# Patient Record
Sex: FEMALE | Race: WHITE | NOT HISPANIC OR LATINO | Employment: OTHER | URBAN - METROPOLITAN AREA
[De-identification: names, ages, dates, MRNs, and addresses within clinical notes are randomized per-mention and may not be internally consistent; named-entity substitution may affect disease eponyms.]

---

## 2023-04-17 ENCOUNTER — TELEPHONE (OUTPATIENT)
Dept: PRIMARY CARE | Facility: CLINIC | Age: 73
End: 2023-04-17
Payer: MEDICARE

## 2023-05-11 ENCOUNTER — OFFICE VISIT (OUTPATIENT)
Dept: PRIMARY CARE | Facility: CLINIC | Age: 73
End: 2023-05-11
Payer: MEDICARE

## 2023-05-11 ENCOUNTER — LAB (OUTPATIENT)
Dept: LAB | Facility: LAB | Age: 73
End: 2023-05-11
Payer: MEDICARE

## 2023-05-11 VITALS
WEIGHT: 134 LBS | BODY MASS INDEX: 26.31 KG/M2 | SYSTOLIC BLOOD PRESSURE: 169 MMHG | HEIGHT: 60 IN | DIASTOLIC BLOOD PRESSURE: 74 MMHG | HEART RATE: 88 BPM | OXYGEN SATURATION: 98 %

## 2023-05-11 DIAGNOSIS — I10 HYPERTENSION, UNSPECIFIED TYPE: ICD-10-CM

## 2023-05-11 DIAGNOSIS — E03.9 HYPOTHYROIDISM, UNSPECIFIED TYPE: ICD-10-CM

## 2023-05-11 DIAGNOSIS — E11.9 TYPE 2 DIABETES MELLITUS WITHOUT COMPLICATION, WITHOUT LONG-TERM CURRENT USE OF INSULIN (MULTI): ICD-10-CM

## 2023-05-11 DIAGNOSIS — I10 HYPERTENSION, UNSPECIFIED TYPE: Primary | ICD-10-CM

## 2023-05-11 DIAGNOSIS — E78.5 HYPERLIPIDEMIA, UNSPECIFIED HYPERLIPIDEMIA TYPE: ICD-10-CM

## 2023-05-11 LAB
ESTIMATED AVERAGE GLUCOSE FOR HBA1C: 151 MG/DL
HEMOGLOBIN A1C/HEMOGLOBIN TOTAL IN BLOOD: 6.9 %
THYROTROPIN (MIU/L) IN SER/PLAS BY DETECTION LIMIT <= 0.05 MIU/L: 0.45 MIU/L (ref 0.44–3.98)

## 2023-05-11 PROCEDURE — 83036 HEMOGLOBIN GLYCOSYLATED A1C: CPT

## 2023-05-11 PROCEDURE — 84443 ASSAY THYROID STIM HORMONE: CPT

## 2023-05-11 PROCEDURE — 3077F SYST BP >= 140 MM HG: CPT | Performed by: STUDENT IN AN ORGANIZED HEALTH CARE EDUCATION/TRAINING PROGRAM

## 2023-05-11 PROCEDURE — 1159F MED LIST DOCD IN RCRD: CPT | Performed by: STUDENT IN AN ORGANIZED HEALTH CARE EDUCATION/TRAINING PROGRAM

## 2023-05-11 PROCEDURE — 36415 COLL VENOUS BLD VENIPUNCTURE: CPT

## 2023-05-11 PROCEDURE — 84244 ASSAY OF RENIN: CPT

## 2023-05-11 PROCEDURE — 3078F DIAST BP <80 MM HG: CPT | Performed by: STUDENT IN AN ORGANIZED HEALTH CARE EDUCATION/TRAINING PROGRAM

## 2023-05-11 PROCEDURE — 1160F RVW MEDS BY RX/DR IN RCRD: CPT | Performed by: STUDENT IN AN ORGANIZED HEALTH CARE EDUCATION/TRAINING PROGRAM

## 2023-05-11 PROCEDURE — 99214 OFFICE O/P EST MOD 30 MIN: CPT | Performed by: STUDENT IN AN ORGANIZED HEALTH CARE EDUCATION/TRAINING PROGRAM

## 2023-05-11 PROCEDURE — 82088 ASSAY OF ALDOSTERONE: CPT

## 2023-05-11 RX ORDER — ARTIFICIAL TEARS 1; 2; 3 MG/ML; MG/ML; MG/ML
SOLUTION/ DROPS OPHTHALMIC 4 TIMES DAILY
COMMUNITY

## 2023-05-11 RX ORDER — BLOOD-GLUCOSE METER
EACH MISCELLANEOUS
COMMUNITY
End: 2023-05-18

## 2023-05-11 RX ORDER — ALBUTEROL SULFATE 90 UG/1
AEROSOL, METERED RESPIRATORY (INHALATION)
COMMUNITY
Start: 2021-11-08

## 2023-05-11 RX ORDER — PYRIDOXINE HCL (VITAMIN B6) 100 MG
1 TABLET ORAL DAILY
COMMUNITY
Start: 2021-01-15

## 2023-05-11 RX ORDER — FLUTICASONE PROPIONATE 50 MCG
2 SPRAY, SUSPENSION (ML) NASAL DAILY
COMMUNITY
Start: 2017-12-06 | End: 2023-08-03 | Stop reason: ALTCHOICE

## 2023-05-11 RX ORDER — GLUCOSAMINE/CHONDR SU A SOD 750-600 MG
TABLET ORAL
COMMUNITY
Start: 2021-01-15

## 2023-05-11 RX ORDER — BUPROPION HYDROCHLORIDE 150 MG/1
1 TABLET ORAL DAILY
COMMUNITY
Start: 2021-01-15 | End: 2023-09-01

## 2023-05-11 RX ORDER — ATENOLOL AND CHLORTHALIDONE TABLET 100; 25 MG/1; MG/1
1 TABLET ORAL DAILY
COMMUNITY
End: 2023-05-11 | Stop reason: ALTCHOICE

## 2023-05-11 RX ORDER — CLONIDINE HYDROCHLORIDE 0.1 MG/1
TABLET ORAL
COMMUNITY
Start: 2023-04-16 | End: 2023-05-11 | Stop reason: SDUPTHER

## 2023-05-11 RX ORDER — LEVOTHYROXINE SODIUM 13 UG/1
CAPSULE ORAL
COMMUNITY
End: 2023-08-03 | Stop reason: SDUPTHER

## 2023-05-11 RX ORDER — ACETAMINOPHEN 500 MG
1 TABLET ORAL DAILY
COMMUNITY
Start: 2021-01-15

## 2023-05-11 RX ORDER — ATENOLOL AND CHLORTHALIDONE TABLET 50; 25 MG/1; MG/1
1 TABLET ORAL DAILY
COMMUNITY
End: 2023-05-11 | Stop reason: ALTCHOICE

## 2023-05-11 RX ORDER — FUROSEMIDE 20 MG/1
1 TABLET ORAL DAILY
COMMUNITY
Start: 2023-01-01

## 2023-05-11 RX ORDER — CLONIDINE HYDROCHLORIDE 0.1 MG/1
0.1 TABLET ORAL 2 TIMES DAILY
Qty: 60 TABLET | Refills: 1 | Status: SHIPPED | OUTPATIENT
Start: 2023-05-11 | End: 2024-05-21

## 2023-05-11 RX ORDER — PAROXETINE 10 MG/1
1 TABLET, FILM COATED ORAL DAILY
COMMUNITY

## 2023-05-11 RX ORDER — LOSARTAN POTASSIUM 100 MG/1
1 TABLET ORAL DAILY
COMMUNITY
Start: 2022-11-30 | End: 2023-05-11 | Stop reason: ALTCHOICE

## 2023-05-11 RX ORDER — MULTIVITAMIN
1 TABLET ORAL DAILY
COMMUNITY
Start: 2023-03-13

## 2023-05-11 RX ORDER — ATORVASTATIN CALCIUM 20 MG/1
1 TABLET, FILM COATED ORAL DAILY
COMMUNITY
Start: 2020-06-05 | End: 2023-06-02

## 2023-05-11 RX ORDER — LEVOTHYROXINE SODIUM 125 UG/1
1 TABLET ORAL DAILY
COMMUNITY
Start: 2012-10-25 | End: 2023-06-02

## 2023-05-11 RX ORDER — AMLODIPINE AND OLMESARTAN MEDOXOMIL 5; 40 MG/1; MG/1
1 TABLET ORAL DAILY
COMMUNITY
Start: 2023-01-04 | End: 2024-04-22

## 2023-05-11 RX ORDER — METFORMIN HYDROCHLORIDE 500 MG/1
1 TABLET, EXTENDED RELEASE ORAL DAILY
COMMUNITY
Start: 2020-03-23 | End: 2023-06-19

## 2023-05-11 RX ORDER — LANCETS 33 GAUGE
EACH MISCELLANEOUS
COMMUNITY
Start: 2023-04-25 | End: 2023-11-17

## 2023-05-11 RX ORDER — LANCETS 30 GAUGE
EACH MISCELLANEOUS
COMMUNITY
Start: 2023-02-07

## 2023-05-11 RX ORDER — LOSARTAN POTASSIUM 50 MG/1
1 TABLET ORAL DAILY
COMMUNITY
Start: 2022-11-18 | End: 2023-05-11 | Stop reason: ALTCHOICE

## 2023-05-11 RX ORDER — LEVOTHYROXINE SODIUM 150 UG/1
150 TABLET ORAL DAILY
COMMUNITY
End: 2023-08-03 | Stop reason: SDUPTHER

## 2023-05-11 RX ORDER — AMLODIPINE AND OLMESARTAN MEDOXOMIL 5; 20 MG/1; MG/1
1 TABLET ORAL DAILY
COMMUNITY
Start: 2023-01-27 | End: 2023-05-11 | Stop reason: ALTCHOICE

## 2023-05-11 ASSESSMENT — COLUMBIA-SUICIDE SEVERITY RATING SCALE - C-SSRS
6. HAVE YOU EVER DONE ANYTHING, STARTED TO DO ANYTHING, OR PREPARED TO DO ANYTHING TO END YOUR LIFE?: NO
2. HAVE YOU ACTUALLY HAD ANY THOUGHTS OF KILLING YOURSELF?: NO
1. IN THE PAST MONTH, HAVE YOU WISHED YOU WERE DEAD OR WISHED YOU COULD GO TO SLEEP AND NOT WAKE UP?: NO

## 2023-05-11 ASSESSMENT — ENCOUNTER SYMPTOMS
DEPRESSION: 0
LOSS OF SENSATION IN FEET: 0
OCCASIONAL FEELINGS OF UNSTEADINESS: 0

## 2023-05-11 ASSESSMENT — PATIENT HEALTH QUESTIONNAIRE - PHQ9
2. FEELING DOWN, DEPRESSED OR HOPELESS: NOT AT ALL
1. LITTLE INTEREST OR PLEASURE IN DOING THINGS: NOT AT ALL
SUM OF ALL RESPONSES TO PHQ9 QUESTIONS 1 AND 2: 0

## 2023-05-11 NOTE — PROGRESS NOTES
HPI: 72-year-old female presenting for multiple concerns:    HTN  Home blood pressures have been ranging from the 130s to 180s.  Has monthly pattern roughly of blood pressure spikes, despite strict compliance.  Following with cardiology.  Denies chest pain or shortness of breath.    Hypothyroidism  Last lab within normal limits.    DMII  Stable, tolerating regimen well    HLD  Stable    12 point ROS reviewed and negative other than as stated in HPI    General: Alert, oriented, pleasant, in no acute distress  HEENT:    Head: normocephalic, atraumatic;    eyes: EOMI, no scleral icterus;   CV: Heart with regular rate and rhythm, normal S1/S2, no murmurs  Lungs: CTAB without wheezing, rhonchi or rales; good respiratory effort, no increased work of breathing  Neuro: Cranial nerves grossly intact; alert and oriented  Psych: Appropriate mood and affect    1. HTN  -Above goal in office  - Continue carvedilol 12.5 mg twice daily, amlodipine-olmesartan 5-40 mg daily  -Can take clonidine 0.1 mg twice daily and monitor blood pressure closely, if over 160, should call cardiology  -Continue with cardiology appointment in 2 weeks  - Renal artery ultrasound negative, will get 24-hour metanephrines and aldosterone/renal in activity, though suspicion is low    2. Hypothyroidism  -Continue levothyroxine 125 mcg daily  -Repeat lab    3. DMII  -Last A1c at goal  -Continue metformin  mg every other day  -Repeat A1c    4. HLD  -Continue atorvastatin 20 mg daily    F/U 3-4 months    Chris D'Amico, DO

## 2023-05-16 ENCOUNTER — LAB (OUTPATIENT)
Dept: LAB | Facility: LAB | Age: 73
End: 2023-05-16
Payer: MEDICARE

## 2023-05-16 DIAGNOSIS — I10 HYPERTENSION, UNSPECIFIED TYPE: ICD-10-CM

## 2023-05-16 PROCEDURE — 83835 ASSAY OF METANEPHRINES: CPT

## 2023-05-16 NOTE — RESULT ENCOUNTER NOTE
Thyroid is normal    hemoglobin A1c at 6.9, has been slowly increasing.  Need to improve diet, less carbohydrates.  We will continue to monitor at 3-month intervals.  If continuing to go up, we will need to consider escalation of medical therapy.    Still pending urine results, may take several days.

## 2023-05-18 DIAGNOSIS — E11.9 TYPE 2 DIABETES MELLITUS WITHOUT COMPLICATION, WITHOUT LONG-TERM CURRENT USE OF INSULIN (MULTI): Primary | ICD-10-CM

## 2023-05-18 RX ORDER — BLOOD-GLUCOSE METER
EACH MISCELLANEOUS
Qty: 100 STRIP | Refills: 3 | Status: SHIPPED | OUTPATIENT
Start: 2023-05-18 | End: 2023-10-09

## 2023-05-23 LAB
ALDOSTERONE IN SERUM: 4.8 NG/DL
ALDOSTERONE/RENIN ACTIVITY CALCULATION: 16 RATIO
RENIN ACTIVITY: 0.3 NG/ML/HR

## 2023-05-24 ENCOUNTER — DOCUMENTATION (OUTPATIENT)
Dept: PRIMARY CARE | Facility: CLINIC | Age: 73
End: 2023-05-24
Payer: MEDICARE

## 2023-05-24 NOTE — RESULT ENCOUNTER NOTE
Labs came back from the secondary hypertension evaluation, all within normal limits as expected.  Continue with upcoming cardiology appointment.

## 2023-05-26 ENCOUNTER — DOCUMENTATION (OUTPATIENT)
Dept: PRIMARY CARE | Facility: CLINIC | Age: 73
End: 2023-05-26
Payer: MEDICARE

## 2023-05-26 ENCOUNTER — TELEPHONE (OUTPATIENT)
Dept: PRIMARY CARE | Facility: CLINIC | Age: 73
End: 2023-05-26
Payer: MEDICARE

## 2023-05-26 NOTE — TELEPHONE ENCOUNTER
----- Message from Christopher D'Amico, DO sent at 5/24/2023  8:59 AM EDT -----  Labs came back from the secondary hypertension evaluation, all within normal limits as expected.  Continue with upcoming cardiology appointment.

## 2023-06-02 DIAGNOSIS — E78.5 HYPERLIPIDEMIA, UNSPECIFIED HYPERLIPIDEMIA TYPE: Primary | ICD-10-CM

## 2023-06-02 DIAGNOSIS — E03.9 HYPOTHYROIDISM, UNSPECIFIED TYPE: ICD-10-CM

## 2023-06-02 RX ORDER — LEVOTHYROXINE SODIUM 125 UG/1
125 TABLET ORAL DAILY
Qty: 90 TABLET | Refills: 1 | Status: SHIPPED | OUTPATIENT
Start: 2023-06-02 | End: 2023-12-01

## 2023-06-02 RX ORDER — ATORVASTATIN CALCIUM 20 MG/1
20 TABLET, FILM COATED ORAL DAILY
Qty: 90 TABLET | Refills: 1 | Status: SHIPPED | OUTPATIENT
Start: 2023-06-02 | End: 2023-11-17

## 2023-06-14 LAB
METANEPHRINES, 24 HOUR URINE: 79 UG/24 HR (ref 36–209)
METANEPHRINES, URINE: 47 UG/L
NORMETANEPHRINE 24 HOUR URINE: 334 UG/24 HR (ref 131–612)
NORMETANEPHRINE, URINE: 198 UG/L
VOLUME OF URINE (LC): 1688

## 2023-06-16 DIAGNOSIS — E11.9 TYPE 2 DIABETES MELLITUS WITHOUT COMPLICATION, WITHOUT LONG-TERM CURRENT USE OF INSULIN (MULTI): Primary | ICD-10-CM

## 2023-06-19 RX ORDER — METFORMIN HYDROCHLORIDE 500 MG/1
TABLET, EXTENDED RELEASE ORAL
Qty: 90 TABLET | Refills: 0 | Status: SHIPPED | OUTPATIENT
Start: 2023-06-19 | End: 2023-09-18 | Stop reason: SDUPTHER

## 2023-08-03 ENCOUNTER — LAB (OUTPATIENT)
Dept: LAB | Facility: LAB | Age: 73
End: 2023-08-03
Payer: MEDICARE

## 2023-08-03 ENCOUNTER — OFFICE VISIT (OUTPATIENT)
Dept: PRIMARY CARE | Facility: CLINIC | Age: 73
End: 2023-08-03
Payer: MEDICARE

## 2023-08-03 VITALS
HEIGHT: 60 IN | BODY MASS INDEX: 25.32 KG/M2 | WEIGHT: 129 LBS | DIASTOLIC BLOOD PRESSURE: 54 MMHG | SYSTOLIC BLOOD PRESSURE: 148 MMHG | HEART RATE: 78 BPM | OXYGEN SATURATION: 99 %

## 2023-08-03 DIAGNOSIS — E55.9 VITAMIN D DEFICIENCY: ICD-10-CM

## 2023-08-03 DIAGNOSIS — E11.9 TYPE 2 DIABETES MELLITUS WITHOUT COMPLICATION, WITHOUT LONG-TERM CURRENT USE OF INSULIN (MULTI): ICD-10-CM

## 2023-08-03 DIAGNOSIS — I10 HYPERTENSION, UNSPECIFIED TYPE: ICD-10-CM

## 2023-08-03 DIAGNOSIS — E78.5 HYPERLIPIDEMIA, UNSPECIFIED HYPERLIPIDEMIA TYPE: ICD-10-CM

## 2023-08-03 DIAGNOSIS — I10 HYPERTENSION, UNSPECIFIED TYPE: Primary | ICD-10-CM

## 2023-08-03 DIAGNOSIS — N39.41 URGE INCONTINENCE: ICD-10-CM

## 2023-08-03 DIAGNOSIS — E03.9 HYPOTHYROIDISM, UNSPECIFIED TYPE: ICD-10-CM

## 2023-08-03 PROBLEM — H52.4 BILATERAL PRESBYOPIA: Status: ACTIVE | Noted: 2023-08-03

## 2023-08-03 PROBLEM — R82.90 ABNORMAL URINE FINDING: Status: ACTIVE | Noted: 2023-08-03

## 2023-08-03 PROBLEM — G31.84 MCI (MILD COGNITIVE IMPAIRMENT): Status: ACTIVE | Noted: 2023-08-03

## 2023-08-03 PROBLEM — Z97.3 WEARS EYEGLASSES: Status: ACTIVE | Noted: 2023-08-03

## 2023-08-03 PROBLEM — H04.123 DRY EYE SYNDROME OF BOTH LACRIMAL GLANDS: Status: ACTIVE | Noted: 2023-08-03

## 2023-08-03 PROBLEM — R55 SYNCOPE: Status: ACTIVE | Noted: 2023-08-03

## 2023-08-03 PROBLEM — R73.03 PREDIABETES: Status: ACTIVE | Noted: 2023-08-03

## 2023-08-03 PROBLEM — H43.399 VITREOUS FLOATERS: Status: ACTIVE | Noted: 2023-08-03

## 2023-08-03 PROBLEM — K63.5 COLON POLYPS: Status: ACTIVE | Noted: 2023-08-03

## 2023-08-03 PROBLEM — R93.89 ABNORMAL CHEST XRAY: Status: ACTIVE | Noted: 2023-08-03

## 2023-08-03 PROBLEM — R73.09 ABNORMAL GLUCOSE: Status: ACTIVE | Noted: 2023-08-03

## 2023-08-03 PROBLEM — F17.210 CIGARETTE NICOTINE DEPENDENCE WITHOUT COMPLICATION: Status: ACTIVE | Noted: 2023-08-03

## 2023-08-03 PROBLEM — H52.13 BILATERAL MYOPIA: Status: ACTIVE | Noted: 2023-08-03

## 2023-08-03 PROBLEM — H26.499 POSTERIOR CAPSULAR OPACIFICATION, OBSCURING VISION: Status: ACTIVE | Noted: 2023-08-03

## 2023-08-03 PROBLEM — E78.01 FAMILIAL HYPERCHOLESTEROLEMIA: Status: ACTIVE | Noted: 2023-08-03

## 2023-08-03 PROBLEM — Z96.1 PRESENCE OF ARTIFICIAL INTRA-OCULAR LENS: Status: ACTIVE | Noted: 2023-08-03

## 2023-08-03 PROBLEM — H04.129 DRY EYE SYNDROME: Status: ACTIVE | Noted: 2023-08-03

## 2023-08-03 PROBLEM — R09.89 CAROTID BRUIT: Status: ACTIVE | Noted: 2023-08-03

## 2023-08-03 PROBLEM — M76.829 POSTERIOR TIBIAL TENDON DYSFUNCTION: Status: ACTIVE | Noted: 2023-08-03

## 2023-08-03 PROBLEM — R09.89 ABDOMINAL BRUIT: Status: ACTIVE | Noted: 2023-08-03

## 2023-08-03 PROBLEM — R41.3 MEMORY IMPAIRMENT: Status: ACTIVE | Noted: 2023-08-03

## 2023-08-03 PROBLEM — R93.5 ABNORMAL CT OF THE ABDOMEN: Status: ACTIVE | Noted: 2023-08-03

## 2023-08-03 PROBLEM — F41.9 ANXIETY: Status: ACTIVE | Noted: 2023-08-03

## 2023-08-03 PROBLEM — M76.70 PERONEAL TENDONITIS: Status: ACTIVE | Noted: 2023-08-03

## 2023-08-03 PROBLEM — R53.83 FATIGUE: Status: ACTIVE | Noted: 2023-08-03

## 2023-08-03 PROBLEM — H43.813 POSTERIOR VITREOUS DETACHMENT OF BOTH EYES: Status: ACTIVE | Noted: 2023-08-03

## 2023-08-03 PROBLEM — I95.1 AUTONOMIC ORTHOSTATIC HYPOTENSION: Status: ACTIVE | Noted: 2023-08-03

## 2023-08-03 PROBLEM — H26.493 POSTERIOR CAPSULAR OPACIFICATION VISUALLY SIGNIFICANT OF BOTH EYES: Status: ACTIVE | Noted: 2023-08-03

## 2023-08-03 PROBLEM — R41.3 MEMORY LOSS, SHORT TERM: Status: ACTIVE | Noted: 2023-08-03

## 2023-08-03 PROBLEM — R93.1 ABNORMAL SCREENING CARDIAC CT: Status: ACTIVE | Noted: 2023-08-03

## 2023-08-03 PROBLEM — M25.579 SINUS TARSI SYNDROME: Status: ACTIVE | Noted: 2023-08-03

## 2023-08-03 PROBLEM — F32.A DEPRESSION: Status: ACTIVE | Noted: 2023-08-03

## 2023-08-03 PROBLEM — H92.09 UNILATERAL OTALGIA: Status: ACTIVE | Noted: 2023-08-03

## 2023-08-03 PROBLEM — M81.0 POSTMENOPAUSAL OSTEOPOROSIS: Status: ACTIVE | Noted: 2023-08-03

## 2023-08-03 PROBLEM — R73.9 HYPERGLYCEMIA: Status: ACTIVE | Noted: 2023-08-03

## 2023-08-03 PROCEDURE — 3044F HG A1C LEVEL LT 7.0%: CPT | Performed by: STUDENT IN AN ORGANIZED HEALTH CARE EDUCATION/TRAINING PROGRAM

## 2023-08-03 PROCEDURE — 3077F SYST BP >= 140 MM HG: CPT | Performed by: STUDENT IN AN ORGANIZED HEALTH CARE EDUCATION/TRAINING PROGRAM

## 2023-08-03 PROCEDURE — 82306 VITAMIN D 25 HYDROXY: CPT

## 2023-08-03 PROCEDURE — 82570 ASSAY OF URINE CREATININE: CPT

## 2023-08-03 PROCEDURE — 83036 HEMOGLOBIN GLYCOSYLATED A1C: CPT

## 2023-08-03 PROCEDURE — 82043 UR ALBUMIN QUANTITATIVE: CPT

## 2023-08-03 PROCEDURE — 1159F MED LIST DOCD IN RCRD: CPT | Performed by: STUDENT IN AN ORGANIZED HEALTH CARE EDUCATION/TRAINING PROGRAM

## 2023-08-03 PROCEDURE — 80061 LIPID PANEL: CPT

## 2023-08-03 PROCEDURE — 84443 ASSAY THYROID STIM HORMONE: CPT

## 2023-08-03 PROCEDURE — 1126F AMNT PAIN NOTED NONE PRSNT: CPT | Performed by: STUDENT IN AN ORGANIZED HEALTH CARE EDUCATION/TRAINING PROGRAM

## 2023-08-03 PROCEDURE — 80053 COMPREHEN METABOLIC PANEL: CPT

## 2023-08-03 PROCEDURE — 36415 COLL VENOUS BLD VENIPUNCTURE: CPT

## 2023-08-03 PROCEDURE — 3078F DIAST BP <80 MM HG: CPT | Performed by: STUDENT IN AN ORGANIZED HEALTH CARE EDUCATION/TRAINING PROGRAM

## 2023-08-03 PROCEDURE — 85027 COMPLETE CBC AUTOMATED: CPT

## 2023-08-03 PROCEDURE — 99214 OFFICE O/P EST MOD 30 MIN: CPT | Performed by: STUDENT IN AN ORGANIZED HEALTH CARE EDUCATION/TRAINING PROGRAM

## 2023-08-03 PROCEDURE — 1160F RVW MEDS BY RX/DR IN RCRD: CPT | Performed by: STUDENT IN AN ORGANIZED HEALTH CARE EDUCATION/TRAINING PROGRAM

## 2023-08-03 RX ORDER — CALCIUM CARBONATE 500(1250)
1 TABLET ORAL DAILY
COMMUNITY

## 2023-08-03 RX ORDER — CARVEDILOL 12.5 MG/1
12.5 TABLET ORAL 2 TIMES DAILY
COMMUNITY

## 2023-08-03 ASSESSMENT — ENCOUNTER SYMPTOMS
DEPRESSION: 0
LOSS OF SENSATION IN FEET: 0
OCCASIONAL FEELINGS OF UNSTEADINESS: 0

## 2023-08-03 ASSESSMENT — COLUMBIA-SUICIDE SEVERITY RATING SCALE - C-SSRS
1. IN THE PAST MONTH, HAVE YOU WISHED YOU WERE DEAD OR WISHED YOU COULD GO TO SLEEP AND NOT WAKE UP?: NO
2. HAVE YOU ACTUALLY HAD ANY THOUGHTS OF KILLING YOURSELF?: NO
6. HAVE YOU EVER DONE ANYTHING, STARTED TO DO ANYTHING, OR PREPARED TO DO ANYTHING TO END YOUR LIFE?: NO

## 2023-08-03 ASSESSMENT — PATIENT HEALTH QUESTIONNAIRE - PHQ9
1. LITTLE INTEREST OR PLEASURE IN DOING THINGS: NOT AT ALL
2. FEELING DOWN, DEPRESSED OR HOPELESS: NOT AT ALL
SUM OF ALL RESPONSES TO PHQ9 QUESTIONS 1 AND 2: 0

## 2023-08-03 NOTE — PROGRESS NOTES
73-year-old female presenting for follow-up on multiple concerns:    HTN  Home blood pressures elevated, systolic 160-170.  Denies chest pain shortness of breath.  Is still smoking pack a day.  At last appointment says she had cardiology upcoming in 2 weeks, now states she still has an appointment upcoming in 2 weeks, and did not have 1 in the interval.  Has been confirms.    Hypothyroidism  Last lab within normal limits.    DMII  Home blood sugars has been elevating, frequently high 100s, low 200    HLD  Stable    Urge incontinence, vulvar lumps  Urge incontinence, chronically.  Reports getting lumps on her vulva as well.  No longer follows with gynecology.    12 point ROS reviewed and negative other than as stated in HPI    General: Alert, oriented, pleasant, in no acute distress  HEENT:    Head: normocephalic, atraumatic;    eyes: EOMI, no scleral icterus;   CV: Heart with regular rate and rhythm, normal S1/S2, no murmurs  Lungs: CTAB without wheezing, rhonchi or rales; good respiratory effort, no increased work of breathing  Neuro: Cranial nerves grossly intact; alert and oriented  Psych: Appropriate mood and affect    1. HTN  - BP acceptable in office  - Continue carvedilol 12.5 mg twice daily, amlodipine-olmesartan 5-40 mg daily  -Can take clonidine 0.1 mg twice daily and monitor blood pressure closely, if over 160, should call cardiology  -Renal artery ultrasound negative, 24-hour metanephrines, aldosterone/renal activity negative  -Continue with upcoming cardiology appointment  -Encouraged to quit smoking    2. Hypothyroidism  -Continue levothyroxine 125 mcg daily  -Repeat lab    3. DMII  -Repeat A1c  -Continue metformin  mg every other day  -Repeat A1c  -Anticipate starting [facility] [place] pharmacist to contact her this afternoon to start and titrate    4. HLD  -Continue atorvastatin 20 mg daily  -Repeat lipid panel    5.  Urge incontinence 6.  Vulvar lesion  - Urogynecology referral    F/U 3  months    Chris D'Amico, DO

## 2023-08-04 ENCOUNTER — TELEMEDICINE (OUTPATIENT)
Dept: PHARMACY | Facility: HOSPITAL | Age: 73
End: 2023-08-04
Payer: MEDICARE

## 2023-08-04 DIAGNOSIS — E11.9 CONTROLLED TYPE 2 DIABETES MELLITUS WITHOUT COMPLICATION, WITHOUT LONG-TERM CURRENT USE OF INSULIN (MULTI): Primary | ICD-10-CM

## 2023-08-04 LAB
ALANINE AMINOTRANSFERASE (SGPT) (U/L) IN SER/PLAS: 16 U/L (ref 7–45)
ALBUMIN (G/DL) IN SER/PLAS: 4.7 G/DL (ref 3.4–5)
ALBUMIN (MG/L) IN URINE: 34.6 MG/L
ALBUMIN/CREATININE (UG/MG) IN URINE: 162.4 UG/MG CRT (ref 0–30)
ALKALINE PHOSPHATASE (U/L) IN SER/PLAS: 71 U/L (ref 33–136)
ANION GAP IN SER/PLAS: 15 MMOL/L (ref 10–20)
ASPARTATE AMINOTRANSFERASE (SGOT) (U/L) IN SER/PLAS: 19 U/L (ref 9–39)
BILIRUBIN TOTAL (MG/DL) IN SER/PLAS: 0.6 MG/DL (ref 0–1.2)
CALCIDIOL (25 OH VITAMIN D3) (NG/ML) IN SER/PLAS: 56 NG/ML
CALCIUM (MG/DL) IN SER/PLAS: 10.2 MG/DL (ref 8.6–10.6)
CARBON DIOXIDE, TOTAL (MMOL/L) IN SER/PLAS: 30 MMOL/L (ref 21–32)
CHLORIDE (MMOL/L) IN SER/PLAS: 101 MMOL/L (ref 98–107)
CHOLESTEROL (MG/DL) IN SER/PLAS: 140 MG/DL (ref 0–199)
CHOLESTEROL IN HDL (MG/DL) IN SER/PLAS: 53.4 MG/DL
CHOLESTEROL/HDL RATIO: 2.6
CREATININE (MG/DL) IN SER/PLAS: 0.66 MG/DL (ref 0.5–1.05)
CREATININE (MG/DL) IN URINE: 21.3 MG/DL (ref 20–320)
ERYTHROCYTE DISTRIBUTION WIDTH (RATIO) BY AUTOMATED COUNT: 13.5 % (ref 11.5–14.5)
ERYTHROCYTE MEAN CORPUSCULAR HEMOGLOBIN CONCENTRATION (G/DL) BY AUTOMATED: 31.7 G/DL (ref 32–36)
ERYTHROCYTE MEAN CORPUSCULAR VOLUME (FL) BY AUTOMATED COUNT: 93 FL (ref 80–100)
ERYTHROCYTES (10*6/UL) IN BLOOD BY AUTOMATED COUNT: 4.83 X10E12/L (ref 4–5.2)
ESTIMATED AVERAGE GLUCOSE FOR HBA1C: 163 MG/DL
GFR FEMALE: >90 ML/MIN/1.73M2
GLUCOSE (MG/DL) IN SER/PLAS: 221 MG/DL (ref 74–99)
HEMATOCRIT (%) IN BLOOD BY AUTOMATED COUNT: 44.8 % (ref 36–46)
HEMOGLOBIN (G/DL) IN BLOOD: 14.2 G/DL (ref 12–16)
HEMOGLOBIN A1C/HEMOGLOBIN TOTAL IN BLOOD: 7.3 %
LDL: 51 MG/DL (ref 0–99)
LEUKOCYTES (10*3/UL) IN BLOOD BY AUTOMATED COUNT: 10.6 X10E9/L (ref 4.4–11.3)
NRBC (PER 100 WBCS) BY AUTOMATED COUNT: 0 /100 WBC (ref 0–0)
PLATELETS (10*3/UL) IN BLOOD AUTOMATED COUNT: 254 X10E9/L (ref 150–450)
POTASSIUM (MMOL/L) IN SER/PLAS: 4.7 MMOL/L (ref 3.5–5.3)
PROTEIN TOTAL: 7.4 G/DL (ref 6.4–8.2)
SODIUM (MMOL/L) IN SER/PLAS: 141 MMOL/L (ref 136–145)
THYROTROPIN (MIU/L) IN SER/PLAS BY DETECTION LIMIT <= 0.05 MIU/L: 2.33 MIU/L (ref 0.44–3.98)
TRIGLYCERIDE (MG/DL) IN SER/PLAS: 178 MG/DL (ref 0–149)
UREA NITROGEN (MG/DL) IN SER/PLAS: 11 MG/DL (ref 6–23)
VLDL: 36 MG/DL (ref 0–40)

## 2023-08-04 NOTE — RESULT ENCOUNTER NOTE
Hemoglobin A1c at 7.3, worsening over past year.  Continue with Cammie the pharmacist to start Mounjaro.  Urine did show protein leaking, so I will also let Cammie know to anticipate adding Jardiance or Farxiga for its significant kidney benefit.  If needed, we can discontinue metformin.    Borderline elevated triglycerides, likely secondary to the elevated blood sugar which should hopefully be taking care of in the future with new regimen.    Remaining labs mostly unremarkable.

## 2023-08-04 NOTE — PROGRESS NOTES
"Pharmacist Clinic: Diabetes Management  Kathy Jimenez is a 73 y.o. female was referred to Clinical Pharmacy Team for diabetes management.     Referring Provider: Christopher D'Amico, DO     HISTORY OF PRESENT ILLNESS  Patient has been well controlled, at last visit patient A1c was 7.3%.   Kathy has diabetes on both sides of her family.   Patient suffers from short term memory loss, today on the phone I spoke with her and her .     LAB REVIEW   Glucose (mg/dL)   Date Value   08/03/2023 221 (H)   01/20/2023 144 (H)   10/10/2022 150 (H)     Hemoglobin A1C (%)   Date Value   08/03/2023 7.3 (A)   05/11/2023 6.9 (A)   10/10/2022 6.4 (A)     Bicarbonate (mmol/L)   Date Value   08/03/2023 30   01/20/2023 32   10/10/2022 32     Urea Nitrogen (mg/dL)   Date Value   08/03/2023 11   01/20/2023 12   10/10/2022 13     Creatinine (mg/dL)   Date Value   08/03/2023 0.66   01/20/2023 0.77   10/10/2022 0.66     Lab Results   Component Value Date    HGBA1C 7.3 (A) 08/03/2023    HGBA1C 6.9 (A) 05/11/2023    HGBA1C 6.4 (A) 10/10/2022     Lab Results   Component Value Date    CHOL 140 08/03/2023    CHOL 127 01/20/2023    CHOL 134 10/10/2022     Lab Results   Component Value Date    HDL 53.4 08/03/2023    HDL 47.3 01/20/2023    HDL 47.7 10/10/2022     No results found for: \"LDLCALC\"  Lab Results   Component Value Date    TRIG 178 (H) 08/03/2023    TRIG 161 (H) 01/20/2023    TRIG 134 10/10/2022       DIABETES ASSESSMENT    CURRENT PHARMACOTHERAPY  - metformin  mg take 1 tablet by mouth every day    SECONDARY PREVENTION  - Statin? Yes  - ACE-I/ARB? Yes  - Aspirin? No    SMBG MEASUREMENTS: patient does to test her sugars     DISCUSSION:   - Spoke with patient and her  regarding new medication options  - Discussed benefits of medications such as farxiga/jardiance and mounjaro, all would be $47 a month  - Patient is asking to avoid injections at this time   - Discussed given co-morbidities we will start with " jardiance, in 3 months if A1c remains above goal we will re-consider a once weekly injection     RECOMMENDATIONS/PLAN  1. Patients diabetes is worsening with most recent A1c of 7.3 % (goal < 7 %).   - Continue all meds under the continuation of care with the referring provider and clinical pharmacy team.  - Initiate jardiance 25 mg once daily.     Clinical Pharmacist follow up: 2 weeks     Thank you,  Cammie Trinh, PharmD    Verbal consent to manage patient's drug therapy was obtained from the patient. They were informed they may decline to participate or withdraw from participation in pharmacy services at any time.

## 2023-08-10 ENCOUNTER — HOSPITAL ENCOUNTER (OUTPATIENT)
Dept: DATA CONVERSION | Facility: HOSPITAL | Age: 73
Discharge: HOME | End: 2023-08-11

## 2023-08-10 DIAGNOSIS — Z88.2 ALLERGY STATUS TO SULFONAMIDES: ICD-10-CM

## 2023-08-10 DIAGNOSIS — Z79.899 OTHER LONG TERM (CURRENT) DRUG THERAPY: ICD-10-CM

## 2023-08-10 DIAGNOSIS — F32.A DEPRESSION, UNSPECIFIED: ICD-10-CM

## 2023-08-10 DIAGNOSIS — R53.81 OTHER MALAISE: ICD-10-CM

## 2023-08-10 DIAGNOSIS — Z79.84 LONG TERM (CURRENT) USE OF ORAL HYPOGLYCEMIC DRUGS: ICD-10-CM

## 2023-08-10 DIAGNOSIS — R73.03 PREDIABETES: ICD-10-CM

## 2023-08-10 DIAGNOSIS — E03.9 HYPOTHYROIDISM, UNSPECIFIED: ICD-10-CM

## 2023-08-10 DIAGNOSIS — N30.00 ACUTE CYSTITIS WITHOUT HEMATURIA: ICD-10-CM

## 2023-08-10 DIAGNOSIS — F17.210 NICOTINE DEPENDENCE, CIGARETTES, UNCOMPLICATED: ICD-10-CM

## 2023-08-10 DIAGNOSIS — R10.9 UNSPECIFIED ABDOMINAL PAIN: ICD-10-CM

## 2023-08-10 DIAGNOSIS — R10.814 LEFT LOWER QUADRANT ABDOMINAL TENDERNESS: ICD-10-CM

## 2023-08-10 DIAGNOSIS — I10 ESSENTIAL (PRIMARY) HYPERTENSION: ICD-10-CM

## 2023-08-10 LAB
ALBUMIN SERPL-MCNC: 4.8 GM/DL (ref 3.5–5)
ALBUMIN/GLOB SERPL: 1.6 RATIO (ref 1.5–3)
ALP BLD-CCNC: 81 U/L (ref 35–125)
ALT SERPL-CCNC: 20 U/L (ref 5–40)
ANION GAP SERPL CALCULATED.3IONS-SCNC: 12 MMOL/L (ref 0–19)
AST SERPL-CCNC: 22 U/L (ref 5–40)
BASOPHILS # BLD AUTO: 0.06 K/UL (ref 0–0.22)
BASOPHILS NFR BLD AUTO: 0.5 % (ref 0–1)
BILIRUB DIRECT SERPL-MCNC: 0.2 MG/DL (ref 0–0.2)
BILIRUB INDIRECT SERPL-MCNC: 0.1 MG/DL (ref 0–0.8)
BILIRUB SERPL-MCNC: 0.3 MG/DL (ref 0.1–1.2)
BILIRUB UR QL STRIP.AUTO: NEGATIVE
BUN SERPL-MCNC: 15 MG/DL (ref 8–25)
BUN/CREAT SERPL: 21.4 RATIO (ref 8–21)
CALCIUM SERPL-MCNC: 10.2 MG/DL (ref 8.5–10.4)
CHLORIDE SERPL-SCNC: 101 MMOL/L (ref 97–107)
CLARITY UR: CLEAR
CO2 SERPL-SCNC: 27 MMOL/L (ref 24–31)
COLOR UR: YELLOW
CREAT SERPL-MCNC: 0.7 MG/DL (ref 0.4–1.6)
DEPRECATED RDW RBC AUTO: 44.6 FL (ref 37–54)
DIFFERENTIAL METHOD BLD: ABNORMAL
EOSINOPHIL # BLD AUTO: 0.1 K/UL (ref 0–0.45)
EOSINOPHIL NFR BLD: 0.8 % (ref 0–3)
ERYTHROCYTE [DISTWIDTH] IN BLOOD BY AUTOMATED COUNT: 13.1 % (ref 11.7–15)
GFR SERPL CREATININE-BSD FRML MDRD: 92 ML/MIN/1.73 M2
GLOBULIN SER-MCNC: 3 G/DL (ref 1.9–3.7)
GLUCOSE SERPL-MCNC: 149 MG/DL (ref 65–99)
GLUCOSE UR STRIP.AUTO-MCNC: >=1000 MG/DL
HCT VFR BLD AUTO: 42.7 % (ref 36–44)
HGB BLD-MCNC: 13.7 GM/DL (ref 12–15)
HGB UR QL STRIP.AUTO: ABNORMAL /HPF (ref 0–3)
HGB UR QL: ABNORMAL
IMM GRANULOCYTES # BLD AUTO: 0.01 K/UL (ref 0–0.1)
KETONES UR QL STRIP.AUTO: NEGATIVE
LEUKOCYTE ESTERASE UR QL STRIP.AUTO: ABNORMAL
LYMPHOCYTES # BLD AUTO: 4.77 K/UL (ref 1.2–3.2)
LYMPHOCYTES NFR BLD MANUAL: 39.7 % (ref 20–40)
MCH RBC QN AUTO: 29.1 PG (ref 26–34)
MCHC RBC AUTO-ENTMCNC: 32.1 % (ref 31–37)
MCV RBC AUTO: 90.9 FL (ref 80–100)
MICROSCOPIC (UA): ABNORMAL
MONOCYTES # BLD AUTO: 0.7 K/UL (ref 0–0.8)
MONOCYTES NFR BLD MANUAL: 5.8 % (ref 0–8)
NEUTROPHILS # BLD AUTO: 6.37 K/UL
NEUTROPHILS # BLD AUTO: 6.37 K/UL (ref 1.8–7.7)
NEUTROPHILS.IMMATURE NFR BLD: 0.1 % (ref 0–1)
NEUTS SEG NFR BLD: 53.1 % (ref 50–70)
NITRITE UR QL STRIP.AUTO: NEGATIVE
PH UR STRIP.AUTO: 5.5 [PH] (ref 4.6–8)
PLATELET # BLD AUTO: 256 K/UL (ref 150–450)
PMV BLD AUTO: 9.3 CU (ref 7–12.6)
POTASSIUM SERPL-SCNC: 3.8 MMOL/L (ref 3.4–5.1)
PROT SERPL-MCNC: 7.8 G/DL (ref 5.9–7.9)
PROT UR STRIP.AUTO-MCNC: NEGATIVE MG/DL
RBC # BLD AUTO: 4.7 M/UL (ref 4–4.9)
SODIUM SERPL-SCNC: 140 MMOL/L (ref 133–145)
SP GR UR STRIP.AUTO: 1.02 (ref 1–1.03)
URINE CULTURE: ABNORMAL
UROBILINOGEN UR QL STRIP.AUTO: 0.2 MG/DL (ref 0–1)
WBC # BLD AUTO: 12 K/UL (ref 4.5–11)
WBC #/AREA URNS AUTO: ABNORMAL /HPF (ref 0–3)

## 2023-08-11 LAB
BACTERIA SPEC CULT: NORMAL
CC # UR: NORMAL /UL
REPORT STATUS -LH SQ DATA CONVERSION: NORMAL
SERVICE CMNT-IMP: NORMAL
SPECIMEN SOURCE: NORMAL

## 2023-08-18 ENCOUNTER — TELEMEDICINE (OUTPATIENT)
Dept: PHARMACY | Facility: HOSPITAL | Age: 73
End: 2023-08-18
Payer: MEDICARE

## 2023-08-18 DIAGNOSIS — E11.9 CONTROLLED TYPE 2 DIABETES MELLITUS WITHOUT COMPLICATION, WITHOUT LONG-TERM CURRENT USE OF INSULIN (MULTI): Primary | ICD-10-CM

## 2023-08-18 NOTE — PROGRESS NOTES
"Pharmacist Clinic: Diabetes Management  Kathy Jimenez is a 73 y.o. female was referred to Clinical Pharmacy Team for diabetes management.     Referring Provider: Christopher D'Amico, DO     HISTORY OF PRESENT ILLNESS  Patient has been well controlled, at last visit patient A1c was 7.3%.   Kathy has diabetes on both sides of her family.   Patient suffers from short term memory loss, today on the phone I spoke with her and her .     LAB REVIEW   Glucose (mg/dL)   Date Value   08/03/2023 221 (H)   01/20/2023 144 (H)   10/10/2022 150 (H)     Hemoglobin A1C (%)   Date Value   08/03/2023 7.3 (A)   05/11/2023 6.9 (A)   10/10/2022 6.4 (A)     Bicarbonate (mmol/L)   Date Value   08/03/2023 30   01/20/2023 32   10/10/2022 32     Urea Nitrogen (mg/dL)   Date Value   08/03/2023 11   01/20/2023 12   10/10/2022 13     Creatinine (mg/dL)   Date Value   08/03/2023 0.66   01/20/2023 0.77   10/10/2022 0.66     Lab Results   Component Value Date    HGBA1C 7.3 (A) 08/03/2023    HGBA1C 6.9 (A) 05/11/2023    HGBA1C 6.4 (A) 10/10/2022     Lab Results   Component Value Date    CHOL 140 08/03/2023    CHOL 127 01/20/2023    CHOL 134 10/10/2022     Lab Results   Component Value Date    HDL 53.4 08/03/2023    HDL 47.3 01/20/2023    HDL 47.7 10/10/2022     No results found for: \"LDLCALC\"  Lab Results   Component Value Date    TRIG 178 (H) 08/03/2023    TRIG 161 (H) 01/20/2023    TRIG 134 10/10/2022       DIABETES ASSESSMENT    CURRENT PHARMACOTHERAPY  - metformin  mg take 1 tablet by mouth every day  - jardiance 25 mg once daily    SECONDARY PREVENTION  - Statin? Yes  - ACE-I/ARB? Yes  - Aspirin? No    SMBG MEASUREMENTS: fluctuating between 100-300    DISCUSSION:   - Patient is tolerating jardiance, she had a UTI on 8/11 - it was treated with antibiotics and has since resolved, if she has another UTI we will watch this and if she has a second one in the near future we will plan to change her meds   - Discussed good times to " test your sugars, before eating for the day we want to see your numbers between  and 2 hours after eating we want to see your numbers under 180    RECOMMENDATIONS/PLAN  1. Patients diabetes is worsening with most recent A1c of 7.3 % (goal < 7 %).   - Continue all meds under the continuation of care with the referring provider and clinical pharmacy team.    Clinical Pharmacist follow up: 1 month      Thank you,  Cammie Trinh, PharmD    Verbal consent to manage patient's drug therapy was obtained from the patient. They were informed they may decline to participate or withdraw from participation in pharmacy services at any time.

## 2023-09-01 DIAGNOSIS — F32.A DEPRESSION, UNSPECIFIED DEPRESSION TYPE: Primary | ICD-10-CM

## 2023-09-01 RX ORDER — BUPROPION HYDROCHLORIDE 150 MG/1
150 TABLET ORAL DAILY
Qty: 90 TABLET | Refills: 0 | Status: SHIPPED | OUTPATIENT
Start: 2023-09-01 | End: 2023-12-05

## 2023-09-18 ENCOUNTER — TELEMEDICINE (OUTPATIENT)
Dept: PHARMACY | Facility: HOSPITAL | Age: 73
End: 2023-09-18
Payer: MEDICARE

## 2023-09-18 DIAGNOSIS — E11.9 TYPE 2 DIABETES MELLITUS WITHOUT COMPLICATION, WITHOUT LONG-TERM CURRENT USE OF INSULIN (MULTI): ICD-10-CM

## 2023-09-18 DIAGNOSIS — E11.9 CONTROLLED TYPE 2 DIABETES MELLITUS WITHOUT COMPLICATION, WITHOUT LONG-TERM CURRENT USE OF INSULIN (MULTI): ICD-10-CM

## 2023-09-18 RX ORDER — METFORMIN HYDROCHLORIDE 500 MG/1
1000 TABLET, EXTENDED RELEASE ORAL DAILY
Qty: 180 TABLET | Refills: 0 | Status: SHIPPED | OUTPATIENT
Start: 2023-09-18 | End: 2023-12-15

## 2023-09-18 NOTE — PROGRESS NOTES
"Pharmacist Clinic: Diabetes Management  Kathy Jimenez is a 73 y.o. female was referred to Clinical Pharmacy Team for diabetes management.     Referring Provider: Christopher D'Amico, DO     HISTORY OF PRESENT ILLNESS  Patient has been well controlled, at last visit patient A1c was 7.3%.   Kathy has diabetes on both sides of her family.   Patient suffers from short term memory loss, today on the phone I spoke with her and her .     LAB REVIEW   Glucose (mg/dL)   Date Value   08/03/2023 221 (H)   01/20/2023 144 (H)   10/10/2022 150 (H)     Hemoglobin A1C (%)   Date Value   08/03/2023 7.3 (A)   05/11/2023 6.9 (A)   10/10/2022 6.4 (A)     Bicarbonate (mmol/L)   Date Value   08/03/2023 30   01/20/2023 32   10/10/2022 32     Urea Nitrogen (mg/dL)   Date Value   08/03/2023 11   01/20/2023 12   10/10/2022 13     Creatinine (mg/dL)   Date Value   08/03/2023 0.66   01/20/2023 0.77   10/10/2022 0.66     Lab Results   Component Value Date    HGBA1C 7.3 (A) 08/03/2023    HGBA1C 6.9 (A) 05/11/2023    HGBA1C 6.4 (A) 10/10/2022     Lab Results   Component Value Date    CHOL 140 08/03/2023    CHOL 127 01/20/2023    CHOL 134 10/10/2022     Lab Results   Component Value Date    HDL 53.4 08/03/2023    HDL 47.3 01/20/2023    HDL 47.7 10/10/2022     No results found for: \"LDLCALC\"  Lab Results   Component Value Date    TRIG 178 (H) 08/03/2023    TRIG 161 (H) 01/20/2023    TRIG 134 10/10/2022       DIABETES ASSESSMENT    CURRENT PHARMACOTHERAPY  - metformin  mg take 1 tablet by mouth every day  - jardiance 25 mg once daily    SECONDARY PREVENTION  - Statin? Yes  - ACE-I/ARB? Yes  - Aspirin? No    SMBG MEASUREMENTS:   - AM readings between 150-170   - 2 hours after eating: between 190-220    DISCUSSION:   - Patient is tolerating jardiance without any questions or concerns   - Patient worries her sugars are higher than they should be, we discussed she is correct and we will increase her metformin to 500 mg by mouth 2 " tabs once daily    RECOMMENDATIONS/PLAN  1. Patients diabetes is worsening with most recent A1c of 7.3 % (goal < 7 %).   - Continue all meds under the continuation of care with the referring provider and clinical pharmacy team.  - Increase metformin to 2 tablets by mouth once daily    Clinical Pharmacist follow up: 2 weeks    Thank you,  Cammie Trinh, PharmD    Verbal consent to manage patient's drug therapy was obtained from the patient. They were informed they may decline to participate or withdraw from participation in pharmacy services at any time.

## 2023-09-25 PROBLEM — H26.499 POSTERIOR CAPSULAR OPACIFICATION, OBSCURING VISION: Status: RESOLVED | Noted: 2023-08-03 | Resolved: 2023-09-25

## 2023-09-25 PROBLEM — R73.03 PREDIABETES: Status: RESOLVED | Noted: 2023-08-03 | Resolved: 2023-09-25

## 2023-09-25 PROBLEM — Z79.899 CHRONIC USE OF BENZODIAZEPINE FOR THERAPEUTIC PURPOSE: Status: ACTIVE | Noted: 2023-09-25

## 2023-09-25 PROBLEM — R32 INCONTINENCE: Status: ACTIVE | Noted: 2023-09-25

## 2023-09-25 PROBLEM — R53.83 FATIGUE: Status: RESOLVED | Noted: 2023-08-03 | Resolved: 2023-09-25

## 2023-09-25 PROBLEM — R41.3 MEMORY LOSS, SHORT TERM: Status: RESOLVED | Noted: 2023-08-03 | Resolved: 2023-09-25

## 2023-09-25 PROBLEM — R35.1 NOCTURIA: Status: ACTIVE | Noted: 2023-09-25

## 2023-09-25 PROBLEM — Z46.89 FITTING AND ADJUSTMENT OF PESSARY: Status: ACTIVE | Noted: 2023-09-25

## 2023-09-25 PROBLEM — N81.89 PELVIC FLOOR WEAKNESS: Status: ACTIVE | Noted: 2023-09-25

## 2023-09-25 PROBLEM — N81.4 PROLAPSE, UTEROVAGINAL: Status: ACTIVE | Noted: 2023-09-25

## 2023-09-25 PROBLEM — H04.129 DRY EYE SYNDROME: Status: RESOLVED | Noted: 2023-08-03 | Resolved: 2023-09-25

## 2023-09-25 RX ORDER — NITROFURANTOIN 25; 75 MG/1; MG/1
100 CAPSULE ORAL 2 TIMES DAILY
COMMUNITY
Start: 2023-08-11 | End: 2023-10-12 | Stop reason: ALTCHOICE

## 2023-09-25 RX ORDER — NITROFURANTOIN (MACROCRYSTALS) 100 MG/1
CAPSULE ORAL
COMMUNITY
End: 2023-10-12 | Stop reason: ALTCHOICE

## 2023-10-02 ENCOUNTER — TELEMEDICINE CLINICAL SUPPORT (OUTPATIENT)
Dept: PHARMACY | Facility: HOSPITAL | Age: 73
End: 2023-10-02
Payer: MEDICARE

## 2023-10-02 DIAGNOSIS — E11.9 TYPE 2 DIABETES MELLITUS WITHOUT COMPLICATION, WITHOUT LONG-TERM CURRENT USE OF INSULIN (MULTI): Primary | ICD-10-CM

## 2023-10-02 NOTE — PROGRESS NOTES
"Pharmacist Clinic: Diabetes Management  Kathy Jimenez is a 73 y.o. female was referred to Clinical Pharmacy Team for diabetes management.     Referring Provider: Christopher D'Amico, DO     HISTORY OF PRESENT ILLNESS  Patient has been well controlled, at last visit patient A1c was 7.3%.   Patient has diabetes on both sides of her family.   Patient suffers from short term memory loss, today on the phone I spoke with her and her .     LAB REVIEW   Glucose   Date Value   08/10/2023 149 MG/DL (H)   08/03/2023 221 mg/dL (H)   04/16/2023 174 MG/DL (H)     Hemoglobin A1C (%)   Date Value   08/03/2023 7.3 (A)   05/11/2023 6.9 (A)   10/10/2022 6.4 (A)     Bicarbonate   Date Value   08/10/2023 27 MMOL/L   08/03/2023 30 mmol/L   04/16/2023 29 MMOL/L     Urea Nitrogen   Date Value   08/10/2023 15 MG/DL   08/03/2023 11 mg/dL   04/16/2023 8 MG/DL     Creatinine   Date Value   08/10/2023 0.7 MG/DL   08/03/2023 0.66 mg/dL   04/16/2023 0.5 MG/DL     Lab Results   Component Value Date    HGBA1C 7.3 (A) 08/03/2023    HGBA1C 6.9 (A) 05/11/2023    HGBA1C 6.4 (A) 10/10/2022     Lab Results   Component Value Date    CHOL 140 08/03/2023    CHOL 127 01/20/2023    CHOL 134 10/10/2022     Lab Results   Component Value Date    HDL 53.4 08/03/2023    HDL 47.3 01/20/2023    HDL 47.7 10/10/2022     No results found for: \"LDLCALC\"  Lab Results   Component Value Date    TRIG 178 (H) 08/03/2023    TRIG 161 (H) 01/20/2023    TRIG 134 10/10/2022       DIABETES ASSESSMENT    CURRENT PHARMACOTHERAPY  - metformin  mg take 2 tablets by mouth every day  - jardiance 25 mg once daily    SECONDARY PREVENTION  - Statin? Yes  - ACE-I/ARB? Yes  - Aspirin? No    SMBG MEASUREMENTS:   - 153, 184, 208  - 168, 209, 138  - 148, 233, 162  - 252, 201, 262  - 159, 165, 167  - 138,  254, 210  - 178, 181, 136  - 209, 189, 208  - 206, 239, 173  - 178, 223, 150  - 191, 245, 216  - 152, 154, 206  - 237    DISCUSSION:   - Patient is tolerating current " medication regimen without any questions or concerns    - Discussed blood glucose readings, patient tests approx 2 hours after eating   - Reinforced goals of 1) testing in the AM before eating and 2) waiting 2 full hours after eating   - Discussed BG goals of fasting sugars between  and 2 hours after eating under 180    RECOMMENDATIONS/PLAN  1. Patients diabetes is worsening with most recent A1c of 7.3 % (goal < 7 %).   - Continue all meds under the continuation of care with the referring provider and clinical pharmacy team.    Clinical Pharmacist follow up: 3 weeks    Thank you,     Verbal consent to manage patient's drug therapy was obtained from the patient. They were informed they may decline to participate or withdraw from participation in pharmacy services at any time.

## 2023-10-07 DIAGNOSIS — E11.9 TYPE 2 DIABETES MELLITUS WITHOUT COMPLICATION, WITHOUT LONG-TERM CURRENT USE OF INSULIN (MULTI): ICD-10-CM

## 2023-10-09 DIAGNOSIS — E11.9 TYPE 2 DIABETES MELLITUS WITHOUT COMPLICATION, WITHOUT LONG-TERM CURRENT USE OF INSULIN (MULTI): ICD-10-CM

## 2023-10-09 RX ORDER — BLOOD-GLUCOSE METER
EACH MISCELLANEOUS
Qty: 100 EACH | Refills: 2 | Status: SHIPPED | OUTPATIENT
Start: 2023-10-09 | End: 2023-10-12 | Stop reason: SDUPTHER

## 2023-10-09 RX ORDER — BLOOD-GLUCOSE METER
EACH MISCELLANEOUS
Qty: 100 EACH | Refills: 2 | Status: SHIPPED | OUTPATIENT
Start: 2023-10-09 | End: 2023-10-09 | Stop reason: SDUPTHER

## 2023-10-12 ENCOUNTER — OFFICE VISIT (OUTPATIENT)
Dept: PRIMARY CARE | Facility: CLINIC | Age: 73
End: 2023-10-12
Payer: MEDICARE

## 2023-10-12 VITALS
WEIGHT: 123 LBS | DIASTOLIC BLOOD PRESSURE: 56 MMHG | HEART RATE: 73 BPM | BODY MASS INDEX: 24.15 KG/M2 | SYSTOLIC BLOOD PRESSURE: 138 MMHG | OXYGEN SATURATION: 97 % | HEIGHT: 60 IN

## 2023-10-12 DIAGNOSIS — E78.5 HYPERLIPIDEMIA, UNSPECIFIED HYPERLIPIDEMIA TYPE: ICD-10-CM

## 2023-10-12 DIAGNOSIS — F17.210 NICOTINE DEPENDENCE, CIGARETTES, UNCOMPLICATED: ICD-10-CM

## 2023-10-12 DIAGNOSIS — Z12.31 ENCOUNTER FOR SCREENING MAMMOGRAM FOR MALIGNANT NEOPLASM OF BREAST: ICD-10-CM

## 2023-10-12 DIAGNOSIS — E11.9 TYPE 2 DIABETES MELLITUS WITHOUT COMPLICATION, WITHOUT LONG-TERM CURRENT USE OF INSULIN (MULTI): Primary | ICD-10-CM

## 2023-10-12 DIAGNOSIS — Z00.00 WELLNESS EXAMINATION: ICD-10-CM

## 2023-10-12 DIAGNOSIS — N39.41 URGE INCONTINENCE: ICD-10-CM

## 2023-10-12 DIAGNOSIS — Z78.0 ASYMPTOMATIC MENOPAUSAL STATE: ICD-10-CM

## 2023-10-12 DIAGNOSIS — I10 HYPERTENSION, UNSPECIFIED TYPE: ICD-10-CM

## 2023-10-12 DIAGNOSIS — E55.9 VITAMIN D DEFICIENCY: ICD-10-CM

## 2023-10-12 DIAGNOSIS — E11.9 TYPE 2 DIABETES MELLITUS WITHOUT COMPLICATION, WITHOUT LONG-TERM CURRENT USE OF INSULIN (MULTI): ICD-10-CM

## 2023-10-12 DIAGNOSIS — Z00.00 MEDICARE ANNUAL WELLNESS VISIT, SUBSEQUENT: ICD-10-CM

## 2023-10-12 DIAGNOSIS — F17.200 NICOTINE USE DISORDER: ICD-10-CM

## 2023-10-12 DIAGNOSIS — E03.9 HYPOTHYROIDISM, UNSPECIFIED TYPE: ICD-10-CM

## 2023-10-12 PROCEDURE — 99214 OFFICE O/P EST MOD 30 MIN: CPT | Performed by: STUDENT IN AN ORGANIZED HEALTH CARE EDUCATION/TRAINING PROGRAM

## 2023-10-12 PROCEDURE — 1126F AMNT PAIN NOTED NONE PRSNT: CPT | Performed by: STUDENT IN AN ORGANIZED HEALTH CARE EDUCATION/TRAINING PROGRAM

## 2023-10-12 PROCEDURE — 1170F FXNL STATUS ASSESSED: CPT | Performed by: STUDENT IN AN ORGANIZED HEALTH CARE EDUCATION/TRAINING PROGRAM

## 2023-10-12 PROCEDURE — G0008 ADMIN INFLUENZA VIRUS VAC: HCPCS | Performed by: STUDENT IN AN ORGANIZED HEALTH CARE EDUCATION/TRAINING PROGRAM

## 2023-10-12 PROCEDURE — 99397 PER PM REEVAL EST PAT 65+ YR: CPT | Performed by: STUDENT IN AN ORGANIZED HEALTH CARE EDUCATION/TRAINING PROGRAM

## 2023-10-12 PROCEDURE — 1159F MED LIST DOCD IN RCRD: CPT | Performed by: STUDENT IN AN ORGANIZED HEALTH CARE EDUCATION/TRAINING PROGRAM

## 2023-10-12 PROCEDURE — 3078F DIAST BP <80 MM HG: CPT | Performed by: STUDENT IN AN ORGANIZED HEALTH CARE EDUCATION/TRAINING PROGRAM

## 2023-10-12 PROCEDURE — 3051F HG A1C>EQUAL 7.0%<8.0%: CPT | Performed by: STUDENT IN AN ORGANIZED HEALTH CARE EDUCATION/TRAINING PROGRAM

## 2023-10-12 PROCEDURE — G0009 ADMIN PNEUMOCOCCAL VACCINE: HCPCS | Performed by: STUDENT IN AN ORGANIZED HEALTH CARE EDUCATION/TRAINING PROGRAM

## 2023-10-12 PROCEDURE — 90677 PCV20 VACCINE IM: CPT | Performed by: STUDENT IN AN ORGANIZED HEALTH CARE EDUCATION/TRAINING PROGRAM

## 2023-10-12 PROCEDURE — 90662 IIV NO PRSV INCREASED AG IM: CPT | Performed by: STUDENT IN AN ORGANIZED HEALTH CARE EDUCATION/TRAINING PROGRAM

## 2023-10-12 PROCEDURE — 1160F RVW MEDS BY RX/DR IN RCRD: CPT | Performed by: STUDENT IN AN ORGANIZED HEALTH CARE EDUCATION/TRAINING PROGRAM

## 2023-10-12 PROCEDURE — G0439 PPPS, SUBSEQ VISIT: HCPCS | Performed by: STUDENT IN AN ORGANIZED HEALTH CARE EDUCATION/TRAINING PROGRAM

## 2023-10-12 PROCEDURE — 3075F SYST BP GE 130 - 139MM HG: CPT | Performed by: STUDENT IN AN ORGANIZED HEALTH CARE EDUCATION/TRAINING PROGRAM

## 2023-10-12 RX ORDER — BLOOD-GLUCOSE METER
EACH MISCELLANEOUS
Qty: 100 EACH | Refills: 2 | Status: SHIPPED | OUTPATIENT
Start: 2023-10-12 | End: 2024-01-22

## 2023-10-12 RX ORDER — ESTRADIOL 0.1 MG/G
CREAM VAGINAL
COMMUNITY

## 2023-10-12 ASSESSMENT — ACTIVITIES OF DAILY LIVING (ADL)
GROCERY_SHOPPING: INDEPENDENT
MANAGING_FINANCES: INDEPENDENT
DOING_HOUSEWORK: INDEPENDENT
TAKING_MEDICATION: INDEPENDENT
BATHING: INDEPENDENT
DRESSING: INDEPENDENT

## 2023-10-12 ASSESSMENT — ENCOUNTER SYMPTOMS
DEPRESSION: 0
LOSS OF SENSATION IN FEET: 0
OCCASIONAL FEELINGS OF UNSTEADINESS: 0

## 2023-10-12 NOTE — PROGRESS NOTES
Subjective   Reason for Visit: Kathy Jimenez is an 73 y.o. female here for a Medicare Wellness visit.          Reviewed all medications by prescribing practitioner or clinical pharmacist (such as prescriptions, OTCs, herbal therapies and supplements) and documented in the medical record.    HPI    Patient Care Team:  Christopher D'Amico, DO as PCP - General  Christopher D'Amico, DO as PCP - [employer] PCP  Buddy Stone MD as Primary Care Provider     Review of Systems    Objective   Vitals:  /65   Pulse 73   Ht 1.524 m (5')   Wt 55.8 kg (123 lb)   SpO2 97%   BMI 24.02 kg/m²       Physical Exam    Assessment/Plan   Problem List Items Addressed This Visit    None           HPI: 73-year-old female presenting for follow-up on chronic conditions, Medicare annual wellness exam/CPE    HTN  Home blood pressures have been mostly good, below 150 systolic almost always, mostly below 140.  Asymptomatic without chest pain/shortness of breath.  Has been also following with cardiology now.     Hypothyroidism  Stable     DMII  Home blood sugar readings between 100 and low 200s.  Tolerating regimen fine.     HLD  Stable     Urge incontinence, uterovaginal prolapse  Following with urogynecology, has pessary placed, has not done physical therapy yet.  Still urinating frequently.    SocHx:   - Smoking: Daily, 55-pack-year history    12 point ROS reviewed and negative other than as stated in HPI      General: Alert, oriented, pleasant, in no acute distress  HEENT:      Head: normocephalic, atraumatic;      eyes: EOMI, no scleral icterus;   Neck: soft, supple, non-tender, no masses appreciated  CV: Heart with regular rate and rhythm, normal S1/S2, no murmurs  Lungs: CTAB without wheezing, rhonchi or rales; good respiratory effort, no increased work of breathing  Abdomen: soft, non-tender, non-distended, no masses appreciated  Extremities: Trace edema bilaterally  Neuro: Cranial nerves grossly intact;  alert and oriented  Psych: Appropriate mood and affect    1. HM  -CBC, CMP, Lipid panel, Vit D, TSH with reflex T4  -Vaccines:       Flu: IO      Shingrix: Recommended 2nd shot, advised to go to local pharmacy      Pneumococcal: Prevnar 20 IO      Tdap: Recommended 2nd shot, advised to go to local pharmacy  -Tio w/ amee: Ordered  -Lung cancer screening with low-dose CT: 55 years, ordered  -Colonoscopy: 2020, 5 year plan  -Osteoporosis screening: DEXA    #HTN  - BP acceptable in office, has found much more stable blood pressure readings at home, been mostly acceptable measurements  - Continue carvedilol 12.5 mg twice daily, amlodipine-olmesartan 5-40 mg daily, clonidine 0.1 mg twice daily  -Renal artery ultrasound negative, 24-hour metanephrines, aldosterone/renal activity negative  -Continue with cardiology  -Encouraged to quit smoking     #Hypothyroidism  -Continue levothyroxine 125 mcg daily  -Repeat lab     #DMII  -Most recent A1c at 7.3  -Currently on metformin  mg twice daily, Jardiance 25 mg daily  -UTD ophthalmology  -Discussed with pharmacist based on home blood sugars, likely GLP-1 would be beneficial, pharmacist to help titrate     #HLD  -Continue atorvastatin 20 mg daily  -Repeat lipid panel     #Urge incontinence #uterovaginal prolapse  -Continue to follow with urogynecology    F/U 3 months, sooner if indicated    Chris D'Amico, DO

## 2023-10-23 ENCOUNTER — APPOINTMENT (OUTPATIENT)
Dept: PHARMACY | Facility: HOSPITAL | Age: 73
End: 2023-10-23
Payer: MEDICARE

## 2023-10-25 ENCOUNTER — LAB (OUTPATIENT)
Dept: LAB | Facility: LAB | Age: 73
End: 2023-10-25
Payer: MEDICARE

## 2023-10-25 DIAGNOSIS — E55.9 VITAMIN D DEFICIENCY: ICD-10-CM

## 2023-10-25 DIAGNOSIS — N39.41 URGE INCONTINENCE: ICD-10-CM

## 2023-10-25 DIAGNOSIS — I10 HYPERTENSION, UNSPECIFIED TYPE: ICD-10-CM

## 2023-10-25 DIAGNOSIS — E78.5 HYPERLIPIDEMIA, UNSPECIFIED HYPERLIPIDEMIA TYPE: ICD-10-CM

## 2023-10-25 DIAGNOSIS — F17.200 NICOTINE USE DISORDER: ICD-10-CM

## 2023-10-25 DIAGNOSIS — Z78.0 ASYMPTOMATIC MENOPAUSAL STATE: ICD-10-CM

## 2023-10-25 DIAGNOSIS — Z12.31 ENCOUNTER FOR SCREENING MAMMOGRAM FOR MALIGNANT NEOPLASM OF BREAST: ICD-10-CM

## 2023-10-25 DIAGNOSIS — E03.9 HYPOTHYROIDISM, UNSPECIFIED TYPE: ICD-10-CM

## 2023-10-25 DIAGNOSIS — E11.9 TYPE 2 DIABETES MELLITUS WITHOUT COMPLICATION, WITHOUT LONG-TERM CURRENT USE OF INSULIN (MULTI): ICD-10-CM

## 2023-10-25 LAB
25(OH)D3 SERPL-MCNC: 64 NG/ML (ref 30–100)
ALBUMIN SERPL BCP-MCNC: 4.4 G/DL (ref 3.4–5)
ALP SERPL-CCNC: 55 U/L (ref 33–136)
ALT SERPL W P-5'-P-CCNC: 15 U/L (ref 7–45)
ANION GAP SERPL CALC-SCNC: 15 MMOL/L (ref 10–20)
AST SERPL W P-5'-P-CCNC: 18 U/L (ref 9–39)
BILIRUB SERPL-MCNC: 0.6 MG/DL (ref 0–1.2)
BUN SERPL-MCNC: 12 MG/DL (ref 6–23)
CALCIUM SERPL-MCNC: 9 MG/DL (ref 8.6–10.6)
CHLORIDE SERPL-SCNC: 99 MMOL/L (ref 98–107)
CHOLEST SERPL-MCNC: 131 MG/DL (ref 0–199)
CHOLESTEROL/HDL RATIO: 2.9
CO2 SERPL-SCNC: 29 MMOL/L (ref 21–32)
CREAT SERPL-MCNC: 0.72 MG/DL (ref 0.5–1.05)
CREAT UR-MCNC: 42.7 MG/DL (ref 20–320)
ERYTHROCYTE [DISTWIDTH] IN BLOOD BY AUTOMATED COUNT: 13.1 % (ref 11.5–14.5)
EST. AVERAGE GLUCOSE BLD GHB EST-MCNC: 146 MG/DL
GFR SERPL CREATININE-BSD FRML MDRD: 90 ML/MIN/1.73M*2
GLUCOSE SERPL-MCNC: 141 MG/DL (ref 74–99)
HBA1C MFR BLD: 6.7 %
HCT VFR BLD AUTO: 43.2 % (ref 36–46)
HDLC SERPL-MCNC: 44.6 MG/DL
HGB BLD-MCNC: 13.5 G/DL (ref 12–16)
LDLC SERPL CALC-MCNC: 58 MG/DL
MCH RBC QN AUTO: 29.7 PG (ref 26–34)
MCHC RBC AUTO-ENTMCNC: 31.3 G/DL (ref 32–36)
MCV RBC AUTO: 95 FL (ref 80–100)
MICROALBUMIN UR-MCNC: 33.6 MG/L
MICROALBUMIN/CREAT UR: 78.7 UG/MG CREAT
NON HDL CHOLESTEROL: 86 MG/DL (ref 0–149)
NRBC BLD-RTO: 0 /100 WBCS (ref 0–0)
PLATELET # BLD AUTO: 279 X10*3/UL (ref 150–450)
PMV BLD AUTO: 10.7 FL (ref 7.5–11.5)
POTASSIUM SERPL-SCNC: 4.3 MMOL/L (ref 3.5–5.3)
PROT SERPL-MCNC: 7 G/DL (ref 6.4–8.2)
RBC # BLD AUTO: 4.54 X10*6/UL (ref 4–5.2)
SODIUM SERPL-SCNC: 139 MMOL/L (ref 136–145)
T4 FREE SERPL-MCNC: 1.56 NG/DL (ref 0.78–1.48)
TRIGL SERPL-MCNC: 142 MG/DL (ref 0–149)
TSH SERPL-ACNC: 6.02 MIU/L (ref 0.44–3.98)
VLDL: 28 MG/DL (ref 0–40)
WBC # BLD AUTO: 11.9 X10*3/UL (ref 4.4–11.3)

## 2023-10-25 PROCEDURE — 84443 ASSAY THYROID STIM HORMONE: CPT

## 2023-10-25 PROCEDURE — 82306 VITAMIN D 25 HYDROXY: CPT

## 2023-10-25 PROCEDURE — 80061 LIPID PANEL: CPT

## 2023-10-25 PROCEDURE — 85027 COMPLETE CBC AUTOMATED: CPT

## 2023-10-25 PROCEDURE — 82043 UR ALBUMIN QUANTITATIVE: CPT

## 2023-10-25 PROCEDURE — 80053 COMPREHEN METABOLIC PANEL: CPT

## 2023-10-25 PROCEDURE — 36415 COLL VENOUS BLD VENIPUNCTURE: CPT

## 2023-10-25 PROCEDURE — 82570 ASSAY OF URINE CREATININE: CPT

## 2023-10-25 PROCEDURE — 84439 ASSAY OF FREE THYROXINE: CPT

## 2023-10-25 PROCEDURE — 83036 HEMOGLOBIN GLYCOSYLATED A1C: CPT

## 2023-10-25 NOTE — RESULT ENCOUNTER NOTE
Protein still present in urine, already on max dose olmesartan and Jardiance, which is good    TSH is slightly high at 6.02, with T4 slightly high at 1.56, these are slightly contradictory numbers, and I would not change dosing currently, repeat labs in the next couple months.    Hemoglobin A1c at 6.7, which is good, continue to follow with Cammie and take prescribed medication.    Borderline elevated white blood cell count, has been seen in past labs as well, very borderline at 11.9, upper limit of normal at 11.3.  At next lab, we should get differential to see if any white blood cell lines are abnormal, though I am not very concerned for it.    Remaining labs unremarkable.

## 2023-10-26 ENCOUNTER — TELEMEDICINE (OUTPATIENT)
Dept: PHARMACY | Facility: HOSPITAL | Age: 73
End: 2023-10-26
Payer: MEDICARE

## 2023-10-26 DIAGNOSIS — E11.9 TYPE 2 DIABETES MELLITUS WITHOUT COMPLICATION, WITHOUT LONG-TERM CURRENT USE OF INSULIN (MULTI): Primary | ICD-10-CM

## 2023-10-26 NOTE — PROGRESS NOTES
Pharmacist Clinic: Diabetes Management  Kathy Jimenez is a 73 y.o. female was referred to Clinical Pharmacy Team for diabetes management.     Referring Provider: Christopher D'Amico, DO     HISTORY OF PRESENT ILLNESS  Patient has been well controlled, at last visit patient A1c was 7.3%.   Patient has diabetes on both sides of her family.   Patient suffers from short term memory loss, today on the phone I spoke with her and her .     LAB REVIEW   Glucose   Date Value   10/25/2023 141 mg/dL (H)   08/10/2023 149 MG/DL (H)   08/03/2023 221 mg/dL (H)   04/16/2023 174 MG/DL (H)     Hemoglobin A1C (%)   Date Value   10/25/2023 6.7 (H)   08/03/2023 7.3 (A)   05/11/2023 6.9 (A)   10/10/2022 6.4 (A)     Bicarbonate   Date Value   10/25/2023 29 mmol/L   08/10/2023 27 MMOL/L   08/03/2023 30 mmol/L   04/16/2023 29 MMOL/L     Urea Nitrogen   Date Value   10/25/2023 12 mg/dL   08/10/2023 15 MG/DL   08/03/2023 11 mg/dL   04/16/2023 8 MG/DL     Creatinine   Date Value   10/25/2023 0.72 mg/dL   08/10/2023 0.7 MG/DL   08/03/2023 0.66 mg/dL   04/16/2023 0.5 MG/DL     Lab Results   Component Value Date    HGBA1C 6.7 (H) 10/25/2023    HGBA1C 7.3 (A) 08/03/2023    HGBA1C 6.9 (A) 05/11/2023     Lab Results   Component Value Date    CHOL 131 10/25/2023    CHOL 140 08/03/2023    CHOL 127 01/20/2023     Lab Results   Component Value Date    HDL 44.6 10/25/2023    HDL 53.4 08/03/2023    HDL 47.3 01/20/2023     Lab Results   Component Value Date    LDLCALC 58 10/25/2023     Lab Results   Component Value Date    TRIG 142 10/25/2023    TRIG 178 (H) 08/03/2023    TRIG 161 (H) 01/20/2023       DIABETES ASSESSMENT    CURRENT PHARMACOTHERAPY  - metformin  mg take 2 tablets by mouth every day  - jardiance 25 mg once daily    SECONDARY PREVENTION  - Statin? Yes  - ACE-I/ARB? Yes  - Aspirin? No    SMBG MEASUREMENTS: patient tests 3 times per day, readings from     DISCUSSION:   - Discussed A1c came back at 6.7%, which is defined  as well controlled diabetes  - Patient does not want to consider injections at this time   - Discussed increasing metformin if we wanted tighter control of t2dm  - Patient wants to avoid medication changes at this time     RECOMMENDATIONS/PLAN  1. Patients diabetes is worsening with most recent A1c of 6.7 % (goal < 7 %).   - Continue all meds under the continuation of care with the referring provider and clinical pharmacy team.    Clinical Pharmacist follow up: 1 month     Thank you,   Cammie Trinh, PharmD     Verbal consent to manage patient's drug therapy was obtained from the patient. They were informed they may decline to participate or withdraw from participation in pharmacy services at any time.

## 2023-11-16 ENCOUNTER — TELEMEDICINE (OUTPATIENT)
Dept: PHARMACY | Facility: HOSPITAL | Age: 73
End: 2023-11-16
Payer: MEDICARE

## 2023-11-16 DIAGNOSIS — E11.9 TYPE 2 DIABETES MELLITUS WITHOUT COMPLICATION, WITHOUT LONG-TERM CURRENT USE OF INSULIN (MULTI): Primary | ICD-10-CM

## 2023-11-16 NOTE — PROGRESS NOTES
Pharmacist Clinic: Diabetes Management  Kathy Jimenez is a 73 y.o. female was referred to Clinical Pharmacy Team for diabetes management.     Referring Provider: Christopher D'Amico, DO     HISTORY OF PRESENT ILLNESS  Patient has been well controlled, at last visit patient A1c was 6.7%  Patient has diabetes on both sides of her family.   Patient suffers from short term memory loss, today on the phone I spoke with her and her .     LAB REVIEW   Glucose   Date Value   10/25/2023 141 mg/dL (H)   08/10/2023 149 MG/DL (H)   08/03/2023 221 mg/dL (H)   04/16/2023 174 MG/DL (H)     Hemoglobin A1C (%)   Date Value   10/25/2023 6.7 (H)   08/03/2023 7.3 (A)   05/11/2023 6.9 (A)   10/10/2022 6.4 (A)     Bicarbonate   Date Value   10/25/2023 29 mmol/L   08/10/2023 27 MMOL/L   08/03/2023 30 mmol/L   04/16/2023 29 MMOL/L     Urea Nitrogen   Date Value   10/25/2023 12 mg/dL   08/10/2023 15 MG/DL   08/03/2023 11 mg/dL   04/16/2023 8 MG/DL     Creatinine   Date Value   10/25/2023 0.72 mg/dL   08/10/2023 0.7 MG/DL   08/03/2023 0.66 mg/dL   04/16/2023 0.5 MG/DL     Lab Results   Component Value Date    HGBA1C 6.7 (H) 10/25/2023    HGBA1C 7.3 (A) 08/03/2023    HGBA1C 6.9 (A) 05/11/2023     Lab Results   Component Value Date    CHOL 131 10/25/2023    CHOL 140 08/03/2023    CHOL 127 01/20/2023     Lab Results   Component Value Date    HDL 44.6 10/25/2023    HDL 53.4 08/03/2023    HDL 47.3 01/20/2023     Lab Results   Component Value Date    LDLCALC 58 10/25/2023     Lab Results   Component Value Date    TRIG 142 10/25/2023    TRIG 178 (H) 08/03/2023    TRIG 161 (H) 01/20/2023       DIABETES ASSESSMENT    CURRENT PHARMACOTHERAPY  - metformin  mg take 2 tablets by mouth every day  - jardiance 25 mg once daily    SECONDARY PREVENTION  - Statin? Yes  - ACE-I/ARB? Yes  - Aspirin? No    SMBG MEASUREMENTS: patient tests 3 times per day, readings from     DISCUSSION:   - Patient is tolerating her current medication  regimen   - She and her  deny questions or concerns at this time     RECOMMENDATIONS/PLAN  1. Patients diabetes is worsening with most recent A1c of 6.7 % (goal < 7 %).   - Continue all meds under the continuation of care with the referring provider and clinical pharmacy team.    Clinical Pharmacist follow up: as needed    Thank you,   Cammie Trinh, PharmD     Verbal consent to manage patient's drug therapy was obtained from the patient. They were informed they may decline to participate or withdraw from participation in pharmacy services at any time.

## 2023-11-17 DIAGNOSIS — E78.5 HYPERLIPIDEMIA, UNSPECIFIED HYPERLIPIDEMIA TYPE: ICD-10-CM

## 2023-11-17 DIAGNOSIS — E11.9 TYPE 2 DIABETES MELLITUS WITHOUT COMPLICATION, WITHOUT LONG-TERM CURRENT USE OF INSULIN (MULTI): Primary | ICD-10-CM

## 2023-11-17 RX ORDER — ATORVASTATIN CALCIUM 20 MG/1
20 TABLET, FILM COATED ORAL DAILY
Qty: 90 TABLET | Refills: 0 | Status: SHIPPED | OUTPATIENT
Start: 2023-11-17 | End: 2024-02-20

## 2023-11-17 RX ORDER — LANCETS 33 GAUGE
EACH MISCELLANEOUS
Qty: 100 EACH | Refills: 1 | Status: SHIPPED | OUTPATIENT
Start: 2023-11-17 | End: 2024-03-06

## 2023-11-28 ENCOUNTER — HOSPITAL ENCOUNTER (OUTPATIENT)
Dept: RADIOLOGY | Facility: CLINIC | Age: 73
Discharge: HOME | End: 2023-11-28
Payer: MEDICARE

## 2023-11-28 DIAGNOSIS — Z12.31 ENCOUNTER FOR SCREENING MAMMOGRAM FOR MALIGNANT NEOPLASM OF BREAST: ICD-10-CM

## 2023-11-28 PROCEDURE — 77067 SCR MAMMO BI INCL CAD: CPT | Performed by: STUDENT IN AN ORGANIZED HEALTH CARE EDUCATION/TRAINING PROGRAM

## 2023-11-28 PROCEDURE — 77067 SCR MAMMO BI INCL CAD: CPT

## 2023-11-28 PROCEDURE — 77063 BREAST TOMOSYNTHESIS BI: CPT | Performed by: STUDENT IN AN ORGANIZED HEALTH CARE EDUCATION/TRAINING PROGRAM

## 2023-12-01 DIAGNOSIS — E03.9 HYPOTHYROIDISM, UNSPECIFIED TYPE: ICD-10-CM

## 2023-12-01 RX ORDER — LEVOTHYROXINE SODIUM 125 UG/1
125 TABLET ORAL DAILY
Qty: 90 TABLET | Refills: 0 | Status: SHIPPED | OUTPATIENT
Start: 2023-12-01 | End: 2023-12-15

## 2023-12-02 DIAGNOSIS — F32.A DEPRESSION, UNSPECIFIED DEPRESSION TYPE: ICD-10-CM

## 2023-12-04 ENCOUNTER — APPOINTMENT (OUTPATIENT)
Dept: PRIMARY CARE | Facility: CLINIC | Age: 73
End: 2023-12-04
Payer: MEDICARE

## 2023-12-05 RX ORDER — BUPROPION HYDROCHLORIDE 150 MG/1
150 TABLET ORAL DAILY
Qty: 90 TABLET | Refills: 0 | Status: SHIPPED | OUTPATIENT
Start: 2023-12-05 | End: 2024-03-08

## 2023-12-12 ENCOUNTER — OFFICE VISIT (OUTPATIENT)
Dept: PRIMARY CARE | Facility: CLINIC | Age: 73
End: 2023-12-12
Payer: MEDICARE

## 2023-12-12 VITALS
WEIGHT: 117 LBS | OXYGEN SATURATION: 96 % | HEIGHT: 60 IN | DIASTOLIC BLOOD PRESSURE: 64 MMHG | SYSTOLIC BLOOD PRESSURE: 148 MMHG | BODY MASS INDEX: 22.97 KG/M2 | HEART RATE: 80 BPM

## 2023-12-12 DIAGNOSIS — E11.9 TYPE 2 DIABETES MELLITUS WITHOUT COMPLICATION, WITHOUT LONG-TERM CURRENT USE OF INSULIN (MULTI): ICD-10-CM

## 2023-12-12 DIAGNOSIS — I10 HYPERTENSION, UNSPECIFIED TYPE: ICD-10-CM

## 2023-12-12 DIAGNOSIS — E78.5 HYPERLIPIDEMIA, UNSPECIFIED HYPERLIPIDEMIA TYPE: ICD-10-CM

## 2023-12-12 DIAGNOSIS — E03.9 HYPOTHYROIDISM, UNSPECIFIED TYPE: Primary | ICD-10-CM

## 2023-12-12 PROCEDURE — 3044F HG A1C LEVEL LT 7.0%: CPT | Performed by: STUDENT IN AN ORGANIZED HEALTH CARE EDUCATION/TRAINING PROGRAM

## 2023-12-12 PROCEDURE — 1159F MED LIST DOCD IN RCRD: CPT | Performed by: STUDENT IN AN ORGANIZED HEALTH CARE EDUCATION/TRAINING PROGRAM

## 2023-12-12 PROCEDURE — 3077F SYST BP >= 140 MM HG: CPT | Performed by: STUDENT IN AN ORGANIZED HEALTH CARE EDUCATION/TRAINING PROGRAM

## 2023-12-12 PROCEDURE — 1160F RVW MEDS BY RX/DR IN RCRD: CPT | Performed by: STUDENT IN AN ORGANIZED HEALTH CARE EDUCATION/TRAINING PROGRAM

## 2023-12-12 PROCEDURE — 3048F LDL-C <100 MG/DL: CPT | Performed by: STUDENT IN AN ORGANIZED HEALTH CARE EDUCATION/TRAINING PROGRAM

## 2023-12-12 PROCEDURE — 3060F POS MICROALBUMINURIA REV: CPT | Performed by: STUDENT IN AN ORGANIZED HEALTH CARE EDUCATION/TRAINING PROGRAM

## 2023-12-12 PROCEDURE — 3078F DIAST BP <80 MM HG: CPT | Performed by: STUDENT IN AN ORGANIZED HEALTH CARE EDUCATION/TRAINING PROGRAM

## 2023-12-12 PROCEDURE — 1126F AMNT PAIN NOTED NONE PRSNT: CPT | Performed by: STUDENT IN AN ORGANIZED HEALTH CARE EDUCATION/TRAINING PROGRAM

## 2023-12-12 PROCEDURE — 99214 OFFICE O/P EST MOD 30 MIN: CPT | Performed by: STUDENT IN AN ORGANIZED HEALTH CARE EDUCATION/TRAINING PROGRAM

## 2023-12-12 ASSESSMENT — PATIENT HEALTH QUESTIONNAIRE - PHQ9
SUM OF ALL RESPONSES TO PHQ9 QUESTIONS 1 AND 2: 0
1. LITTLE INTEREST OR PLEASURE IN DOING THINGS: NOT AT ALL
2. FEELING DOWN, DEPRESSED OR HOPELESS: NOT AT ALL

## 2023-12-12 ASSESSMENT — COLUMBIA-SUICIDE SEVERITY RATING SCALE - C-SSRS
1. IN THE PAST MONTH, HAVE YOU WISHED YOU WERE DEAD OR WISHED YOU COULD GO TO SLEEP AND NOT WAKE UP?: NO
6. HAVE YOU EVER DONE ANYTHING, STARTED TO DO ANYTHING, OR PREPARED TO DO ANYTHING TO END YOUR LIFE?: NO
2. HAVE YOU ACTUALLY HAD ANY THOUGHTS OF KILLING YOURSELF?: NO

## 2023-12-12 ASSESSMENT — ENCOUNTER SYMPTOMS
OCCASIONAL FEELINGS OF UNSTEADINESS: 0
LOSS OF SENSATION IN FEET: 0
DEPRESSION: 0

## 2023-12-12 NOTE — PROGRESS NOTES
73-year-old female presenting for follow-up on chronic conditions:    HTN  Blood pressure stable, tends to be around goal, sometimes higher.  Tolerates regimen well.     Hypothyroidism  Stable, last TSH and T4 were elevated.  Feels well.     DMII  Home blood sugar readings between 100 and low 200s.  Tolerating regimen fine.     HLD  Stable     Urge incontinence, uterovaginal prolapse  Following with urogynecology, has pessary placed, has not done physical therapy yet.  Still urinating frequently.    SocHx:   - Smoking: Daily, 55-pack-year history    12 point ROS reviewed and negative other than as stated in HPI      General: Alert, oriented, pleasant, in no acute distress  HEENT:      Head: normocephalic, atraumatic;      eyes: EOMI, no scleral icterus;   Neck: soft, supple, non-tender, no masses appreciated  CV: Heart with regular rate and rhythm, normal S1/S2, no murmurs  Lungs: CTAB without wheezing, rhonchi or rales; good respiratory effort, no increased work of breathing  Neuro: Cranial nerves grossly intact; alert and oriented  Psych: Appropriate mood and affect    #HTN  - BP likely acceptable for age, particularly given how a medication she is on  - Continue carvedilol 12.5 mg twice daily, amlodipine-olmesartan 5-40 mg daily, clonidine 0.1 mg twice daily  -Renal artery ultrasound negative, 24-hour metanephrines, aldosterone/renal activity negative  -Continue with cardiology  -Encouraged to quit smoking     #Hypothyroidism  -Continue levothyroxine 125 mcg daily  -Repeat lab     #DMII  -Most recent A1c at 6.7  -Currently on metformin  mg twice daily, Jardiance 25 mg daily  -UTD ophthalmology     #HLD  -Continue atorvastatin 20 mg daily     #Urge incontinence #uterovaginal prolapse  -Continue to follow with urogynecology    Defer labs until next visit, as they are mostly recent    F/U 4 months, Medicare in October    Chris D'Amico,

## 2024-01-22 DIAGNOSIS — E11.9 TYPE 2 DIABETES MELLITUS WITHOUT COMPLICATION, WITHOUT LONG-TERM CURRENT USE OF INSULIN (MULTI): ICD-10-CM

## 2024-01-22 RX ORDER — BLOOD-GLUCOSE METER
EACH MISCELLANEOUS
Qty: 100 EACH | Refills: 3 | Status: SHIPPED | OUTPATIENT
Start: 2024-01-22 | End: 2024-06-04

## 2024-01-23 ENCOUNTER — HOSPITAL ENCOUNTER (OUTPATIENT)
Dept: RADIOLOGY | Facility: CLINIC | Age: 74
Discharge: HOME | End: 2024-01-23
Payer: MEDICARE

## 2024-01-23 ENCOUNTER — ANCILLARY PROCEDURE (OUTPATIENT)
Dept: RADIOLOGY | Facility: CLINIC | Age: 74
End: 2024-01-23
Payer: MEDICARE

## 2024-01-23 DIAGNOSIS — F17.210 NICOTINE DEPENDENCE, CIGARETTES, UNCOMPLICATED: ICD-10-CM

## 2024-01-23 DIAGNOSIS — Z78.0 ASYMPTOMATIC MENOPAUSAL STATE: ICD-10-CM

## 2024-01-23 DIAGNOSIS — F17.200 NICOTINE USE DISORDER: ICD-10-CM

## 2024-01-23 PROCEDURE — 77080 DXA BONE DENSITY AXIAL: CPT

## 2024-01-23 PROCEDURE — 71271 CT THORAX LUNG CANCER SCR C-: CPT

## 2024-01-23 PROCEDURE — 77080 DXA BONE DENSITY AXIAL: CPT | Performed by: RADIOLOGY

## 2024-01-25 DIAGNOSIS — J43.9 PULMONARY EMPHYSEMA, UNSPECIFIED EMPHYSEMA TYPE (MULTI): Primary | ICD-10-CM

## 2024-01-25 NOTE — RESULT ENCOUNTER NOTE
There are several pulmonary nodules, advised to continue with annual screenings.    Mild to moderate upper lung emphysema.  I would recommend getting pulmonary function testing to better characterize if patient is amenable.  Orders have been placed.  Strongly encouraged to quit smoking    Severe coronary artery calcification, important to continue following with cardiology closely.

## 2024-01-25 NOTE — RESULT ENCOUNTER NOTE
Bone density scan shows osteopenia, which is some decreased bone density without being severe enough to be categorized as osteoporosis.  I would recommend calcium and vitamin D supplementation, which it looks like she is already taking.  Should be at least 1200 mg of calcium a day.  Further, recommend weightbearing exercises to maintain bone strength.

## 2024-01-29 ENCOUNTER — TELEPHONE (OUTPATIENT)
Dept: PRIMARY CARE | Facility: CLINIC | Age: 74
End: 2024-01-29
Payer: MEDICARE

## 2024-01-29 NOTE — TELEPHONE ENCOUNTER
Advise pt of results. Pt expressed understanding    ----- Message from Christopher D'Amico, DO sent at 1/25/2024  9:20 AM EST -----  Bone density scan shows osteopenia, which is some decreased bone density without being severe enough to be categorized as osteoporosis.  I would recommend calcium and vitamin D supplementation, which it looks like she is already taking.  Should be at least 1200 mg of calcium a day.  Further, recommend weightbearing exercises to maintain bone strength.

## 2024-02-09 ENCOUNTER — OFFICE VISIT (OUTPATIENT)
Dept: UROLOGY | Facility: CLINIC | Age: 74
End: 2024-02-09
Payer: MEDICARE

## 2024-02-09 DIAGNOSIS — N95.2 VAGINAL ATROPHY: ICD-10-CM

## 2024-02-09 DIAGNOSIS — N81.4 PROLAPSE, UTEROVAGINAL: Primary | ICD-10-CM

## 2024-02-09 DIAGNOSIS — N81.89 PELVIC FLOOR WEAKNESS: ICD-10-CM

## 2024-02-09 DIAGNOSIS — R35.0 URINARY FREQUENCY: ICD-10-CM

## 2024-02-09 DIAGNOSIS — Z96.0 PRESENCE OF PESSARY: ICD-10-CM

## 2024-02-09 LAB
POC APPEARANCE, URINE: CLEAR
POC BILIRUBIN, URINE: NEGATIVE
POC BLOOD, URINE: ABNORMAL
POC COLOR, URINE: YELLOW
POC GLUCOSE, URINE: ABNORMAL MG/DL
POC KETONES, URINE: NEGATIVE MG/DL
POC LEUKOCYTES, URINE: NEGATIVE
POC NITRITE,URINE: NEGATIVE
POC PH, URINE: 5 PH
POC PROTEIN, URINE: NEGATIVE MG/DL
POC SPECIFIC GRAVITY, URINE: <=1.005
POC UROBILINOGEN, URINE: 0.2 EU/DL

## 2024-02-09 PROCEDURE — 99213 OFFICE O/P EST LOW 20 MIN: CPT | Performed by: OBSTETRICS & GYNECOLOGY

## 2024-02-09 PROCEDURE — 81003 URINALYSIS AUTO W/O SCOPE: CPT | Mod: QW | Performed by: OBSTETRICS & GYNECOLOGY

## 2024-02-09 PROCEDURE — 1126F AMNT PAIN NOTED NONE PRSNT: CPT | Performed by: OBSTETRICS & GYNECOLOGY

## 2024-02-09 NOTE — PATIENT INSTRUCTIONS
Please continue her vaginal estrogen therapy 3 times a week.    Please follow-up in June 2024 for pessary maintenance.    Please start your daily fiber therapy.    Please contact the clinic with any questions or concerns.    [phone]

## 2024-02-09 NOTE — PROGRESS NOTES
Subjective   Patient ID: Kathy Jimenez is a 73 y.o. female who presents for pessary maintenance.       HPI  73-year-old with stage II uterovaginal prolapse, urinary urgency and frequency, vaginal atrophy, mild pelvic floor pain and weakness having been fitted with a 2.25 inch Gellhorn short stem 9/22/2023    The patient appears to be having excellent results with her Gellhorn pessary, however notes that when she has constipation and trying to push out it feels that her pessary is coming out. .She denies any prolapse complaints . She denies any vaginal complaints, no abnormal bleeding or discharge.     She has noted improvements in her nocturia complaints. She now is waking up once nightly and denies any enuresis. She continues to note urinary urgency during the daytime every 1-2 hours.      She does continue to note some constipation alternating with diarrhea, she does utilize prune juice and metamucil.    She otherwise denies any other complaints  .  From Previous note  73-year-old with stage II uterovaginal prolapse, urinary urgency and frequency, vaginal atrophy, mild pelvic floor pain and weakness having been fitted with a 2.25 inch Gellhorn short stem 9/22/2023.     The patient appears to be having excellent results with her Gellhorn pessary. She denies any prolapse complaints and has noted improvements in her nocturia complaints. She now is waking up once nightly and denies any enuresis. She continues to note urinary urgency during the daytime every 1-2 hours. She otherwise denies any other complaints.     She does continue to note some constipation.     From previous note  73-year-old with stage II uterovaginal prolapse, urinary urgency and frequency, vaginal atrophy, mild pelvic floor pain and weakness presenting for pessary fitting.     She has no other complaints.      From previous note  73 - year-old patient presenting as a referral from Dr. D'Amico with complaints of vaginal bulge, urinary urgency  and frequency      The patient presents with complaints of urinary urgency and frequency. She notes 1-4 episodes of nocturia but denies any enuresis. She voids every 30 minutes during the day with a sense of urgency. She does have a sensation of incomplete emptying and has to strain. She denies leaking on laughing, cough or sneezing. She denies any history of nephrolithiasis, gross hematuria or chronic recurrent UTIs. She is also a daily smoker.      She also notes a vaginal lump/bulge since July 2023, she notes this when she wipes. She states this lump/bulge comes and goes. Sis not sexually active and denies any abnormal vaginal bleeding or discharge. She denies any vaginal dryness or irritation.      She does note loose stool alternating with constipation but has regular bowel movement. She denies any fecal or flatal incontinence. She has a history of colon polyps and gets surveillance colonoscopies every 3 years.      She has no other complaints.    Review of Systems  Constitutional: No fever, No chills and No fatigue.   Eyes: No vision problems and No dryness of the eyes.   ENT: No dry mouth, No hearing loss and No nosebleeds.   Cardiovascular: No chest pain, No palpitations and No orthopnea.   Respiratory: No shortness of breath, No cough and No wheezing.   Gastrointestinal: No abdominal pain, No constipation, No nausea, No diarrhea, No vomiting and No melena.   Genitourinary: As noted in HPI.   Musculoskeletal: No back pain, No myalgias, No muscle weakness, No joint swelling and No leg edema.   Integumentary: No rashes, No skin lesion and No itching.   Neurological: No headache, No numbness and No dizziness.   Psychiatric: No sleep disturbances, No anxiety and No depression.   Endocrine: No hot flashes, No loss of hair and No hirsutism.   Hematologic/Lymphatic: No swollen glands, No tendency for easy bleeding and No tendency for easy bruising.   All other systems have been reviewed and are negative for  complaint.        Objective   Physical Exam    The patient's 2.25 inch Gellhorn short stem was grasped with ring forceps after the area was treated with lidocaine gel and removed without difficulty.  There was no evidence of any excoriations or other abnormalities.  The pessary was cleaned and replaced without difficulty.    Assessment/Plan   73-year-old with stage II uterovaginal prolapse, urinary urgency and frequency, vaginal atrophy, mild pelvic floor pain and weakness having been fitted with a 2.25 inch Gellhorn short stem placed 9/22/2023     1. The patient appears to be very satisfied with her 2.25 inch Gellhorn short stem and will continue this moving forward. Her atrophy and genital hiatus was not amenable to a ring pessary. We have previously discussed expectant management, pelvic floor physical therapy, pessary management, and briefly discussed surgery. She wishes to exhaust all medical therapies before proceeding with any definitive surgical options. She will follow-up in 4 to 5 months for pessary follow-up.     2. We discussed her lower urinary tract symptoms. She has had improvements in her nocturia complaints with her pessary in place. We did discuss the possibility for medication therapy for her daytime urgency and we will readdress this at follow-up appointments.     3. We discussed starting vaginal estrogen therapy. We discussed that safety, efficacy, proper utilization. She will continue this 3 times a week moving forward.      4. She is not interested in pelvic floor physical therapy at this time. We did discuss starting a daily fiber therapy as well as titrating a laxative to a soft bowel movement every day to 2 days.     5. The patient will follow-up in in 4 to 5 months for pessary follow-up.     YULIANA Levi MD       Scribe Attestation  By signing my name below, Yenny KOEHLER Scribe attest that this documentation has been prepared under the direction and in the presence of Bryn WESTON  MD Gurmeet. All medical record entries made by the Scribe were at my direction or personally dictated by me. I have reviewed the chart and agree that the record accurately reflects my personal performance of the history, physical exam, discussion and plan.

## 2024-02-19 DIAGNOSIS — E78.5 HYPERLIPIDEMIA, UNSPECIFIED HYPERLIPIDEMIA TYPE: ICD-10-CM

## 2024-02-20 RX ORDER — ATORVASTATIN CALCIUM 20 MG/1
20 TABLET, FILM COATED ORAL DAILY
Qty: 90 TABLET | Refills: 0 | Status: SHIPPED | OUTPATIENT
Start: 2024-02-20 | End: 2024-05-20

## 2024-02-26 ENCOUNTER — OFFICE VISIT (OUTPATIENT)
Dept: CARDIOLOGY | Facility: CLINIC | Age: 74
End: 2024-02-26
Payer: MEDICARE

## 2024-02-26 VITALS
HEIGHT: 60 IN | WEIGHT: 110.56 LBS | SYSTOLIC BLOOD PRESSURE: 138 MMHG | HEART RATE: 79 BPM | OXYGEN SATURATION: 99 % | DIASTOLIC BLOOD PRESSURE: 62 MMHG | BODY MASS INDEX: 21.71 KG/M2

## 2024-02-26 DIAGNOSIS — I10 BENIGN HYPERTENSION: Primary | ICD-10-CM

## 2024-02-26 DIAGNOSIS — E78.01 FAMILIAL HYPERCHOLESTEROLEMIA: ICD-10-CM

## 2024-02-26 DIAGNOSIS — R93.1 ABNORMAL SCREENING CARDIAC CT: ICD-10-CM

## 2024-02-26 DIAGNOSIS — I10 ESSENTIAL HYPERTENSION: ICD-10-CM

## 2024-02-26 PROCEDURE — 3075F SYST BP GE 130 - 139MM HG: CPT | Performed by: NURSE PRACTITIONER

## 2024-02-26 PROCEDURE — 99214 OFFICE O/P EST MOD 30 MIN: CPT | Performed by: NURSE PRACTITIONER

## 2024-02-26 PROCEDURE — 1160F RVW MEDS BY RX/DR IN RCRD: CPT | Performed by: NURSE PRACTITIONER

## 2024-02-26 PROCEDURE — 3078F DIAST BP <80 MM HG: CPT | Performed by: NURSE PRACTITIONER

## 2024-02-26 PROCEDURE — 93005 ELECTROCARDIOGRAM TRACING: CPT | Performed by: NURSE PRACTITIONER

## 2024-02-26 PROCEDURE — 1126F AMNT PAIN NOTED NONE PRSNT: CPT | Performed by: NURSE PRACTITIONER

## 2024-02-26 PROCEDURE — 93010 ELECTROCARDIOGRAM REPORT: CPT | Performed by: INTERNAL MEDICINE

## 2024-02-26 PROCEDURE — 1159F MED LIST DOCD IN RCRD: CPT | Performed by: NURSE PRACTITIONER

## 2024-02-26 ASSESSMENT — ENCOUNTER SYMPTOMS
OCCASIONAL FEELINGS OF UNSTEADINESS: 0
LOSS OF SENSATION IN FEET: 0
DEPRESSION: 0
HYPERTENSION: 1

## 2024-02-26 NOTE — PROGRESS NOTES
Subjective   Kathy Jimenez is a 73 y.o. female.    Chief Complaint:  Hypertension    Mrs. Petrochic returns for a 6 month follow up. She is accompanied by her supportive . She has been feeling well from a cardiac standpoint. She has remained compliant with her medications, denying any intolerances. She remains active with house work, denying any exertional chest pain or shortness of breath. Her biggest limitation is arthritis pain. She denies any recent ER visits or hospitalizations. She offers no specific cardiovascular complaints or concerns today. She denies any complaints of chest pain, shortness of breath, lightheadedness, dizziness, palpitations, syncope, orthopnea, paroxysmal nocturnal dyspnea, lower extremity swelling or bleeding concerns.      Hypertension        Review of Systems   All other systems reviewed and are negative.      Objective   Physical Exam  Constitutional:       Appearance: Healthy appearance. In no distress  Pulmonary:      Effort: Pulmonary effort is normal.      Breath sounds: Normal breath sounds.   Cardiovascular:      Normal rate. Regular rhythm. Normal S1. Normal S2.       Murmurs: There is no murmur.      Carotids: right carotid pulse +2, no bruit heard over the right carotid. left carotid pulse +2, no bruit heard over the left carotid.  Edema:     Peripheral edema absent.   Abdominal:      Palpations: Abdomen is soft.   Musculoskeletal:       Cervical back: Normal range of motion.   Skin:     General: Skin is warm and dry. Normal color and pigmentation   Neurological:      Mental Status: Alert and oriented to person, place and time.   Psychiatric:     Mood and Affect: appropriate mood and appropriate affect.     EKG obtained and reviewed. Normal sinus rhythm. Anterior infarct, age undetermined. HR 71    Lab Review:   Lab Results   Component Value Date     10/25/2023    K 4.3 10/25/2023    CL 99 10/25/2023    CO2 29 10/25/2023    BUN 12 10/25/2023    CREATININE  0.72 10/25/2023    GLUCOSE 141 (H) 10/25/2023    CALCIUM 9.0 10/25/2023     Lab Results   Component Value Date    WBC 11.9 (H) 10/25/2023    HGB 13.5 10/25/2023    HCT 43.2 10/25/2023    MCV 95 10/25/2023     10/25/2023     Lab Results   Component Value Date    CHOL 131 10/25/2023    TRIG 142 10/25/2023    HDL 44.6 10/25/2023       Assessment/Plan   Mrs. Jimenez is a pleasant 73-year-old  female with a past medical history significant for hypertension, hyperlipidemia, nicotine dependence, diabetes, short term memory loss and elevated CACS 950.1 in 2020. NM stress test in 2020 was negative for ischemia. Echocardiogram 3/2022 showed an EF of 60-65%. She presents today for routine follow up stable from a cardiac standpoint. Her VS and EKG remain stable. She remains on appropriate antiplatelet and lipid lowering therapy with her LDL at goal. I will have her continue all medications unchanged. She was encouraged to continue to remain active. She will follow up with us in clinic in one year. She knows to call for any concerns.

## 2024-02-27 LAB
ATRIAL RATE: 71 BPM
P AXIS: 74 DEGREES
P OFFSET: 200 MS
P ONSET: 148 MS
PR INTERVAL: 146 MS
Q ONSET: 221 MS
QRS COUNT: 12 BEATS
QRS DURATION: 78 MS
QT INTERVAL: 370 MS
QTC CALCULATION(BAZETT): 402 MS
QTC FREDERICIA: 391 MS
R AXIS: 51 DEGREES
T AXIS: 31 DEGREES
T OFFSET: 406 MS
VENTRICULAR RATE: 71 BPM

## 2024-02-29 ENCOUNTER — HOSPITAL ENCOUNTER (OUTPATIENT)
Dept: RESPIRATORY THERAPY | Facility: HOSPITAL | Age: 74
Discharge: HOME | End: 2024-02-29
Payer: MEDICARE

## 2024-02-29 DIAGNOSIS — J43.9 PULMONARY EMPHYSEMA, UNSPECIFIED EMPHYSEMA TYPE (MULTI): ICD-10-CM

## 2024-02-29 PROCEDURE — 94726 PLETHYSMOGRAPHY LUNG VOLUMES: CPT

## 2024-02-29 PROCEDURE — 94726 PLETHYSMOGRAPHY LUNG VOLUMES: CPT | Performed by: INTERNAL MEDICINE

## 2024-02-29 PROCEDURE — 94060 EVALUATION OF WHEEZING: CPT | Performed by: INTERNAL MEDICINE

## 2024-02-29 PROCEDURE — 94729 DIFFUSING CAPACITY: CPT | Performed by: INTERNAL MEDICINE

## 2024-03-04 DIAGNOSIS — E11.9 CONTROLLED TYPE 2 DIABETES MELLITUS WITHOUT COMPLICATION, WITHOUT LONG-TERM CURRENT USE OF INSULIN (MULTI): ICD-10-CM

## 2024-03-04 NOTE — RESULT ENCOUNTER NOTE
Pulmonary function testing not considered to be completely reliable given not on maximal effort.  We know emphysema from previous imaging.  If patient is having difficulty breathing, inhaler treatment may be recommended.  Or can see pulmonology for further evaluation if amenable.

## 2024-03-05 DIAGNOSIS — E11.9 TYPE 2 DIABETES MELLITUS WITHOUT COMPLICATION, WITHOUT LONG-TERM CURRENT USE OF INSULIN (MULTI): ICD-10-CM

## 2024-03-06 RX ORDER — LANCETS 33 GAUGE
EACH MISCELLANEOUS
Qty: 1 EACH | Refills: 0 | Status: SHIPPED | OUTPATIENT
Start: 2024-03-06

## 2024-04-09 ENCOUNTER — OFFICE VISIT (OUTPATIENT)
Dept: PRIMARY CARE | Facility: CLINIC | Age: 74
End: 2024-04-09
Payer: MEDICARE

## 2024-04-09 VITALS
SYSTOLIC BLOOD PRESSURE: 131 MMHG | WEIGHT: 111 LBS | HEIGHT: 60 IN | HEART RATE: 68 BPM | BODY MASS INDEX: 21.79 KG/M2 | DIASTOLIC BLOOD PRESSURE: 69 MMHG | OXYGEN SATURATION: 98 %

## 2024-04-09 DIAGNOSIS — E03.9 HYPOTHYROIDISM, UNSPECIFIED TYPE: ICD-10-CM

## 2024-04-09 DIAGNOSIS — I10 HYPERTENSION, UNSPECIFIED TYPE: Primary | ICD-10-CM

## 2024-04-09 DIAGNOSIS — E78.5 HYPERLIPIDEMIA, UNSPECIFIED HYPERLIPIDEMIA TYPE: ICD-10-CM

## 2024-04-09 DIAGNOSIS — E11.9 TYPE 2 DIABETES MELLITUS WITHOUT COMPLICATION, WITHOUT LONG-TERM CURRENT USE OF INSULIN (MULTI): Primary | ICD-10-CM

## 2024-04-09 DIAGNOSIS — E11.9 TYPE 2 DIABETES MELLITUS WITHOUT COMPLICATION, WITHOUT LONG-TERM CURRENT USE OF INSULIN (MULTI): ICD-10-CM

## 2024-04-09 DIAGNOSIS — N39.41 URGE INCONTINENCE: ICD-10-CM

## 2024-04-09 DIAGNOSIS — E55.9 VITAMIN D DEFICIENCY: ICD-10-CM

## 2024-04-09 PROBLEM — E66.9 OBESITY WITH BODY MASS INDEX 30 OR GREATER: Status: ACTIVE | Noted: 2024-04-09

## 2024-04-09 PROBLEM — R10.9 ABDOMINAL PAIN: Status: ACTIVE | Noted: 2023-08-10

## 2024-04-09 PROBLEM — Z86.69 HISTORY OF CATARACT: Status: ACTIVE | Noted: 2024-04-09

## 2024-04-09 PROBLEM — K57.92 DIVERTICULITIS: Status: ACTIVE | Noted: 2024-04-09

## 2024-04-09 PROBLEM — N39.0 URINARY TRACT INFECTION: Status: ACTIVE | Noted: 2023-08-10

## 2024-04-09 PROBLEM — H43.819 POSTERIOR VITREOUS DETACHMENT: Status: ACTIVE | Noted: 2024-04-09

## 2024-04-09 PROCEDURE — 1160F RVW MEDS BY RX/DR IN RCRD: CPT | Performed by: STUDENT IN AN ORGANIZED HEALTH CARE EDUCATION/TRAINING PROGRAM

## 2024-04-09 PROCEDURE — 3078F DIAST BP <80 MM HG: CPT | Performed by: STUDENT IN AN ORGANIZED HEALTH CARE EDUCATION/TRAINING PROGRAM

## 2024-04-09 PROCEDURE — 1159F MED LIST DOCD IN RCRD: CPT | Performed by: STUDENT IN AN ORGANIZED HEALTH CARE EDUCATION/TRAINING PROGRAM

## 2024-04-09 PROCEDURE — 99214 OFFICE O/P EST MOD 30 MIN: CPT | Performed by: STUDENT IN AN ORGANIZED HEALTH CARE EDUCATION/TRAINING PROGRAM

## 2024-04-09 PROCEDURE — G2211 COMPLEX E/M VISIT ADD ON: HCPCS | Performed by: STUDENT IN AN ORGANIZED HEALTH CARE EDUCATION/TRAINING PROGRAM

## 2024-04-09 PROCEDURE — 3075F SYST BP GE 130 - 139MM HG: CPT | Performed by: STUDENT IN AN ORGANIZED HEALTH CARE EDUCATION/TRAINING PROGRAM

## 2024-04-09 RX ORDER — CHLORHEXIDINE GLUCONATE ORAL RINSE 1.2 MG/ML
SOLUTION DENTAL
COMMUNITY
Start: 2024-04-03

## 2024-04-09 RX ORDER — AMOXICILLIN 500 MG/1
500 TABLET, FILM COATED ORAL 4 TIMES DAILY
COMMUNITY
Start: 2024-04-03

## 2024-04-09 ASSESSMENT — ENCOUNTER SYMPTOMS
OCCASIONAL FEELINGS OF UNSTEADINESS: 0
LOSS OF SENSATION IN FEET: 0
DEPRESSION: 0

## 2024-04-09 ASSESSMENT — PATIENT HEALTH QUESTIONNAIRE - PHQ9
1. LITTLE INTEREST OR PLEASURE IN DOING THINGS: NOT AT ALL
SUM OF ALL RESPONSES TO PHQ9 QUESTIONS 1 AND 2: 0
2. FEELING DOWN, DEPRESSED OR HOPELESS: NOT AT ALL

## 2024-04-09 NOTE — PROGRESS NOTES
73-year-old female presenting for follow-up on chronic conditions:    HTN  Blood pressure stable, tends to be around goal, sometimes higher.  Tolerates regimen well.     Hypothyroidism  Stable, tolerating regimen well.     DMII  Home blood sugar readings nearly half above 200.  Tolerates regimen well.     HLD  Stable     Urge incontinence, uterovaginal prolapse  Following with urogynecology, stable and doing well.    SocHx:   - Smoking: Daily, 55-pack-year history    12 point ROS reviewed and negative other than as stated in HPI      General: Alert, oriented, pleasant, in no acute distress  HEENT:      Head: normocephalic, atraumatic;      eyes: EOMI, no scleral icterus;   Neck: soft, supple, non-tender, no masses appreciated  CV: Heart with regular rate and rhythm, normal S1/S2, no murmurs  Lungs: CTAB without wheezing, rhonchi or rales; good respiratory effort, no increased work of breathing  Neuro: Cranial nerves grossly intact; alert and oriented  Psych: Appropriate mood and affect    #HTN  - BP essentially at goal in office  - Continue carvedilol 12.5 mg twice daily, amlodipine-olmesartan 5-40 mg daily, clonidine 0.1 mg twice daily  -Renal artery ultrasound negative, 24-hour metanephrines, aldosterone/renal activity negative  -Continue with cardiology  -Encouraged to quit smoking     #Hypothyroidism  -Continue levothyroxine 125 mcg daily  -Repeat lab     #DMII  -Most recent A1c at 6.7  -Currently on metformin  mg twice daily, Jardiance 25 mg daily  -UTD ophthalmology  -Given frequent blood sugar readings above 200, will have [facility] pharmacist discussed initiation of GLP-1, can consider decreasing metformin if responding well  - Repeat A1c     #HLD  -Continue atorvastatin 20 mg daily  -Repeat lipid panel    #Urge incontinence #uterovaginal prolapse  -Continue to follow with urogynecology    Follow-up 4 months, will do Medicare at that time to coordinate with  schedule    Chris D'Amico, DO

## 2024-04-10 ENCOUNTER — LAB (OUTPATIENT)
Dept: LAB | Facility: LAB | Age: 74
End: 2024-04-10
Payer: MEDICARE

## 2024-04-10 DIAGNOSIS — E11.9 TYPE 2 DIABETES MELLITUS WITHOUT COMPLICATION, WITHOUT LONG-TERM CURRENT USE OF INSULIN (MULTI): ICD-10-CM

## 2024-04-10 DIAGNOSIS — I10 HYPERTENSION, UNSPECIFIED TYPE: ICD-10-CM

## 2024-04-10 DIAGNOSIS — E03.9 HYPOTHYROIDISM, UNSPECIFIED TYPE: ICD-10-CM

## 2024-04-10 DIAGNOSIS — E78.5 HYPERLIPIDEMIA, UNSPECIFIED HYPERLIPIDEMIA TYPE: ICD-10-CM

## 2024-04-10 DIAGNOSIS — N39.41 URGE INCONTINENCE: ICD-10-CM

## 2024-04-10 DIAGNOSIS — E55.9 VITAMIN D DEFICIENCY: ICD-10-CM

## 2024-04-10 LAB
25(OH)D3 SERPL-MCNC: 64 NG/ML (ref 30–100)
ALBUMIN SERPL BCP-MCNC: 4.2 G/DL (ref 3.4–5)
ALP SERPL-CCNC: 55 U/L (ref 33–136)
ALT SERPL W P-5'-P-CCNC: 17 U/L (ref 7–45)
ANION GAP SERPL CALC-SCNC: 13 MMOL/L (ref 10–20)
AST SERPL W P-5'-P-CCNC: 20 U/L (ref 9–39)
BILIRUB SERPL-MCNC: 0.6 MG/DL (ref 0–1.2)
BUN SERPL-MCNC: 13 MG/DL (ref 6–23)
CALCIUM SERPL-MCNC: 9.9 MG/DL (ref 8.6–10.6)
CHLORIDE SERPL-SCNC: 98 MMOL/L (ref 98–107)
CHOLEST SERPL-MCNC: 131 MG/DL (ref 0–199)
CHOLESTEROL/HDL RATIO: 2.6
CO2 SERPL-SCNC: 33 MMOL/L (ref 21–32)
CREAT SERPL-MCNC: 0.61 MG/DL (ref 0.5–1.05)
EGFRCR SERPLBLD CKD-EPI 2021: >90 ML/MIN/1.73M*2
ERYTHROCYTE [DISTWIDTH] IN BLOOD BY AUTOMATED COUNT: 13.2 % (ref 11.5–14.5)
EST. AVERAGE GLUCOSE BLD GHB EST-MCNC: 163 MG/DL
GLUCOSE SERPL-MCNC: 119 MG/DL (ref 74–99)
HBA1C MFR BLD: 7.3 %
HCT VFR BLD AUTO: 45.1 % (ref 36–46)
HDLC SERPL-MCNC: 50 MG/DL
HGB BLD-MCNC: 14.5 G/DL (ref 12–16)
LDLC SERPL CALC-MCNC: 56 MG/DL
MCH RBC QN AUTO: 30 PG (ref 26–34)
MCHC RBC AUTO-ENTMCNC: 32.2 G/DL (ref 32–36)
MCV RBC AUTO: 93 FL (ref 80–100)
NON HDL CHOLESTEROL: 81 MG/DL (ref 0–149)
NRBC BLD-RTO: 0 /100 WBCS (ref 0–0)
PLATELET # BLD AUTO: 271 X10*3/UL (ref 150–450)
POTASSIUM SERPL-SCNC: 4.6 MMOL/L (ref 3.5–5.3)
PROT SERPL-MCNC: 6.7 G/DL (ref 6.4–8.2)
RBC # BLD AUTO: 4.83 X10*6/UL (ref 4–5.2)
SODIUM SERPL-SCNC: 139 MMOL/L (ref 136–145)
T4 FREE SERPL-MCNC: 1.45 NG/DL (ref 0.78–1.48)
TRIGL SERPL-MCNC: 125 MG/DL (ref 0–149)
TSH SERPL-ACNC: 4.52 MIU/L (ref 0.44–3.98)
VLDL: 25 MG/DL (ref 0–40)
WBC # BLD AUTO: 10.8 X10*3/UL (ref 4.4–11.3)

## 2024-04-10 PROCEDURE — 85027 COMPLETE CBC AUTOMATED: CPT

## 2024-04-10 PROCEDURE — 84439 ASSAY OF FREE THYROXINE: CPT

## 2024-04-10 PROCEDURE — 82306 VITAMIN D 25 HYDROXY: CPT

## 2024-04-10 PROCEDURE — 36415 COLL VENOUS BLD VENIPUNCTURE: CPT

## 2024-04-10 PROCEDURE — 80061 LIPID PANEL: CPT

## 2024-04-10 PROCEDURE — 80053 COMPREHEN METABOLIC PANEL: CPT

## 2024-04-10 PROCEDURE — 84443 ASSAY THYROID STIM HORMONE: CPT

## 2024-04-10 PROCEDURE — 83036 HEMOGLOBIN GLYCOSYLATED A1C: CPT

## 2024-04-10 NOTE — RESULT ENCOUNTER NOTE
Hemoglobin A1c is up at 7.3, as discussed in office, I would like to start one of the new medications that Cammie talk to you about.  Hopefully we go forward with that.    TSH is borderline elevated, much closer to normal than past lab.  We can again repeat lab in a few months, or consider small increase in medication.  I favor waiting to see what the next lab is.  If still persistent, will consider going up to 150 mcg of levothyroxine.    Remaining labs unremarkable

## 2024-04-11 ENCOUNTER — TELEPHONE (OUTPATIENT)
Dept: PRIMARY CARE | Facility: CLINIC | Age: 74
End: 2024-04-11
Payer: MEDICARE

## 2024-04-11 NOTE — TELEPHONE ENCOUNTER
----- Message from Christopher D'Amico, DO sent at 4/10/2024  6:57 PM EDT -----  Hemoglobin A1c is up at 7.3, as discussed in office, I would like to start one of the new medications that Cammie talk to you about.  Hopefully we go forward with that.    TSH is borderline elevated, much closer to normal than past lab.  We can again repeat lab in a few months, or consider small increase in medication.  I favor waiting to see what the next lab is.  If still persistent, will consider going up to 150 mcg of levothyroxine.    Remaining labs unremarkable

## 2024-04-11 NOTE — TELEPHONE ENCOUNTER
Result Communication    Resulted Orders   TSH with reflex to Free T4 if abnormal   Result Value Ref Range    Thyroid Stimulating Hormone 4.52 (H) 0.44 - 3.98 mIU/L    Narrative    TSH testing is performed using different testing methodology at [facility] than at other [facility]. Direct result comparisons should only be made within the same method.     CBC   Result Value Ref Range    WBC 10.8 4.4 - 11.3 x10*3/uL    nRBC 0.0 0.0 - 0.0 /100 WBCs    RBC 4.83 4.00 - 5.20 x10*6/uL    Hemoglobin 14.5 12.0 - 16.0 g/dL    Hematocrit 45.1 36.0 - 46.0 %    MCV 93 80 - 100 fL    MCH 30.0 26.0 - 34.0 pg    MCHC 32.2 32.0 - 36.0 g/dL    RDW 13.2 11.5 - 14.5 %    Platelets 271 150 - 450 x10*3/uL   Lipid Panel   Result Value Ref Range    Cholesterol 131 0 - 199 mg/dL      Comment:            Age      Desirable   Borderline High   High     0-19 Y     0 - 169       170 - 199     >/= 200    20-24 Y     0 - 189       190 - 224     >/= 225         >24 Y     0 - 199       200 - 239     >/= 240   **All ranges are based on fasting samples. Specific   therapeutic targets will vary based on patient-specific   cardiac risk.    Pediatric guidelines reference:Pediatrics 2011, 128(S5).Adult guidelines reference: NCEP ATPIII Guidelines,PINKY 2001, 258:2486-97    Venipuncture immediately after or during the administration of Metamizole may lead to falsely low results. Testing should be performed immediately prior to Metamizole dosing.    HDL-Cholesterol 50.0 mg/dL      Comment:        Age       Very Low   Low     Normal    High    0-19 Y    < 35      < 40     40-45     ----  20-24 Y    ----     < 40      >45      ----        >24 Y      ----     < 40     40-60      >60      Cholesterol/HDL Ratio 2.6       Comment:        Ref Values  Desirable  < 3.4  High Risk  > 5.0    LDL Calculated 56 <=99 mg/dL      Comment:                                  Near   Borderline      AGE      Desirable  Optimal    High     High     Very High      0-19 Y     0 - 109     ---    110-129   >/= 130     ----    20-24 Y     0 - 119     ---    120-159   >/= 160     ----      >24 Y     0 -  99   100-129  130-159   160-189     >/=190      VLDL 25 0 - 40 mg/dL    Triglycerides 125 0 - 149 mg/dL      Comment:         Age         Desirable   Borderline High   High     Very High   0 D-90 D    19 - 174         ----         ----        ----  91 D- 9 Y     0 -  74        75 -  99     >/= 100      ----    10-19 Y     0 -  89        90 - 129     >/= 130      ----    20-24 Y     0 - 114       115 - 149     >/= 150      ----         >24 Y     0 - 149       150 - 199    200- 499    >/= 500    Venipuncture immediately after or during the administration of Metamizole may lead to falsely low results. Testing should be performed immediately prior to Metamizole dosing.    Non HDL Cholesterol 81 0 - 149 mg/dL      Comment:            Age       Desirable   Borderline High   High     Very High     0-19 Y     0 - 119       120 - 144     >/= 145    >/= 160    20-24 Y     0 - 149       150 - 189     >/= 190      ----         >24 Y    30 mg/dL above LDL Cholesterol goal     Comprehensive Metabolic Panel   Result Value Ref Range    Glucose 119 (H) 74 - 99 mg/dL    Sodium 139 136 - 145 mmol/L    Potassium 4.6 3.5 - 5.3 mmol/L    Chloride 98 98 - 107 mmol/L    Bicarbonate 33 (H) 21 - 32 mmol/L    Anion Gap 13 10 - 20 mmol/L    Urea Nitrogen 13 6 - 23 mg/dL    Creatinine 0.61 0.50 - 1.05 mg/dL    eGFR >90 >60 mL/min/1.73m*2      Comment:      Calculations of estimated GFR are performed using the 2021 CKD-EPI Study Refit equation without the race variable for the IDMS-Traceable creatinine methods.  https://jasn.asnjournals.org/content/early/2021/09/22/ASN.8029050833    Calcium 9.9 8.6 - 10.6 mg/dL    Albumin 4.2 3.4 - 5.0 g/dL    Alkaline Phosphatase 55 33 - 136 U/L    Total Protein 6.7 6.4 - 8.2 g/dL    AST 20 9 - 39 U/L    Bilirubin, Total 0.6 0.0 - 1.2 mg/dL    ALT 17 7 - 45 U/L      Comment:       Patients treated with Sulfasalazine may generate falsely decreased results for ALT.   Vitamin D 25-Hydroxy,Total (for eval of Vitamin D levels)   Result Value Ref Range    Vitamin D, 25-Hydroxy, Total 64 30 - 100 ng/mL    Narrative    Deficiency:         < 20   ng/ml  Insufficiency:      20-29  ng/ml  Sufficiency:         ng/ml  This assay accurately quantifies the sum of Vitamin D3, 25-Hydroxy and Vitamin D2,25-Hydroxy.   Hemoglobin A1C   Result Value Ref Range    Hemoglobin A1C 7.3 (H) see below %    Estimated Average Glucose 163 Not Established mg/dL    Narrative    Diagnosis of Diabetes-Adults  Non-Diabetic: < or = 5.6%  Increased risk for developing diabetes: 5.7-6.4%  Diagnostic of diabetes: > or = 6.5%    Monitoring of Diabetes  Age (y)....................... Therapeutic Goal (%)  Adults: >18.........................<7.0  Pediatrics: 13-18...................<7.5  Pediatrics: 7-12....................<8.0  Pediatrics: 0-6..................... 7.5-8.5    American Diabetes Association. Diabetes Care 33(S1), Jan 2010       Thyroxine, Free   Result Value Ref Range    Thyroxine, Free 1.45 0.78 - 1.48 ng/dL    Narrative    Thyroxine Free testing is performed using different testing methodology at [facility] than at other [facility] hospitals. Direct result comparisons should only be made within the same method.       10:04 AM      Results were successfully communicated with the patient and they acknowledged their understanding.

## 2024-04-12 ENCOUNTER — TELEMEDICINE (OUTPATIENT)
Dept: PHARMACY | Facility: HOSPITAL | Age: 74
End: 2024-04-12
Payer: MEDICARE

## 2024-04-12 NOTE — PROGRESS NOTES
Pharmacist Clinic: Diabetes Management  Kathy Jimenez is a 73 y.o. female was referred to Clinical Pharmacy Team for diabetes management.     Referring Provider: Christopher D'Amico, DO     HISTORY OF PRESENT ILLNESS  Patient has been well controlled, at last visit patient A1c was 6.7%  Patient has diabetes on both sides of her family.   Patient suffers from short term memory loss, today on the phone I spoke with her and her .     LAB REVIEW   Glucose   Date Value   04/10/2024 119 mg/dL (H)   10/25/2023 141 mg/dL (H)   08/10/2023 149 MG/DL (H)   08/03/2023 221 mg/dL (H)   04/16/2023 174 MG/DL (H)     Hemoglobin A1C (%)   Date Value   04/10/2024 7.3 (H)   10/25/2023 6.7 (H)   08/03/2023 7.3 (A)   05/11/2023 6.9 (A)   10/10/2022 6.4 (A)     Bicarbonate   Date Value   04/10/2024 33 mmol/L (H)   10/25/2023 29 mmol/L   08/10/2023 27 MMOL/L   08/03/2023 30 mmol/L   04/16/2023 29 MMOL/L     Urea Nitrogen   Date Value   04/10/2024 13 mg/dL   10/25/2023 12 mg/dL   08/10/2023 15 MG/DL   08/03/2023 11 mg/dL   04/16/2023 8 MG/DL     Creatinine   Date Value   04/10/2024 0.61 mg/dL   10/25/2023 0.72 mg/dL   08/10/2023 0.7 MG/DL   08/03/2023 0.66 mg/dL   04/16/2023 0.5 MG/DL     Lab Results   Component Value Date    HGBA1C 7.3 (H) 04/10/2024    HGBA1C 6.7 (H) 10/25/2023    HGBA1C 7.3 (A) 08/03/2023     Lab Results   Component Value Date    CHOL 131 04/10/2024    CHOL 131 10/25/2023    CHOL 140 08/03/2023     Lab Results   Component Value Date    HDL 50.0 04/10/2024    HDL 44.6 10/25/2023    HDL 53.4 08/03/2023     Lab Results   Component Value Date    LDLCALC 56 04/10/2024    LDLCALC 58 10/25/2023     Lab Results   Component Value Date    TRIG 125 04/10/2024    TRIG 142 10/25/2023    TRIG 178 (H) 08/03/2023       DIABETES ASSESSMENT    CURRENT PHARMACOTHERAPY  - metformin  mg take 2 tablets by mouth every day  - jardiance 25 mg once daily    SECONDARY PREVENTION  - Statin? Yes  - ACE-I/ARB? Yes  - Aspirin?  No    SMBG MEASUREMENTS: patient tests 3 times per day, readings from     DISCUSSION:   - Patient is tolerating her current medication regimen   - She and her  deny questions or concerns at this time     RECOMMENDATIONS/PLAN  1. Patients diabetes is worsening with most recent A1c of 6.7 % (goal < 7 %).   - Continue all meds under the continuation of care with the referring provider and clinical pharmacy team.    Clinical Pharmacist follow up: as needed    Thank you,   Cammie Trinh, PharmD     Verbal consent to manage patient's drug therapy was obtained from the patient. They were informed they may decline to participate or withdraw from participation in pharmacy services at any time.

## 2024-04-16 ENCOUNTER — TELEMEDICINE (OUTPATIENT)
Dept: PHARMACY | Facility: HOSPITAL | Age: 74
End: 2024-04-16
Payer: MEDICARE

## 2024-04-16 DIAGNOSIS — E11.9 TYPE 2 DIABETES MELLITUS WITHOUT COMPLICATION, WITHOUT LONG-TERM CURRENT USE OF INSULIN (MULTI): ICD-10-CM

## 2024-04-16 DIAGNOSIS — E11.9 CONTROLLED TYPE 2 DIABETES MELLITUS WITHOUT COMPLICATION, WITHOUT LONG-TERM CURRENT USE OF INSULIN (MULTI): ICD-10-CM

## 2024-04-16 NOTE — PROGRESS NOTES
Pharmacist Clinic: Diabetes Management  Kathy Jimenez is a 73 y.o. female was referred to Clinical Pharmacy Team for diabetes management.     Referring Provider: Christopher D'Amico, DO     HISTORY OF PRESENT ILLNESS  Patient has been well controlled, at last visit patient A1c was 7.3 %  Patient has diabetes on both sides of her family.   Patient suffers from short term memory loss, today on the phone I spoke with her and her .     LAB REVIEW   Glucose   Date Value   04/10/2024 119 mg/dL (H)   10/25/2023 141 mg/dL (H)   08/10/2023 149 MG/DL (H)   08/03/2023 221 mg/dL (H)   04/16/2023 174 MG/DL (H)     Hemoglobin A1C (%)   Date Value   04/10/2024 7.3 (H)   10/25/2023 6.7 (H)   08/03/2023 7.3 (A)   05/11/2023 6.9 (A)   10/10/2022 6.4 (A)     Bicarbonate   Date Value   04/10/2024 33 mmol/L (H)   10/25/2023 29 mmol/L   08/10/2023 27 MMOL/L   08/03/2023 30 mmol/L   04/16/2023 29 MMOL/L     Urea Nitrogen   Date Value   04/10/2024 13 mg/dL   10/25/2023 12 mg/dL   08/10/2023 15 MG/DL   08/03/2023 11 mg/dL   04/16/2023 8 MG/DL     Creatinine   Date Value   04/10/2024 0.61 mg/dL   10/25/2023 0.72 mg/dL   08/10/2023 0.7 MG/DL   08/03/2023 0.66 mg/dL   04/16/2023 0.5 MG/DL     Lab Results   Component Value Date    HGBA1C 7.3 (H) 04/10/2024    HGBA1C 6.7 (H) 10/25/2023    HGBA1C 7.3 (A) 08/03/2023     Lab Results   Component Value Date    CHOL 131 04/10/2024    CHOL 131 10/25/2023    CHOL 140 08/03/2023     Lab Results   Component Value Date    HDL 50.0 04/10/2024    HDL 44.6 10/25/2023    HDL 53.4 08/03/2023     Lab Results   Component Value Date    LDLCALC 56 04/10/2024    LDLCALC 58 10/25/2023     Lab Results   Component Value Date    TRIG 125 04/10/2024    TRIG 142 10/25/2023    TRIG 178 (H) 08/03/2023       DIABETES ASSESSMENT    CURRENT PHARMACOTHERAPY  - metformin  mg take 2 tablets by mouth every day  - jardiance 25 mg once daily    SECONDARY PREVENTION  - Statin? Yes  - ACE-I/ARB? Yes  - Aspirin?  No    SMBG MEASUREMENTS: patient tests 3 times per day, readings from     DISCUSSION:   - A1c is higher than we would like it to be, discussed starting a new medication such as ozempic   - Went over MOA, side effects, expectations, administration   - Answered all questions and concerns    RECOMMENDATIONS/PLAN  1. Patients diabetes is worsening with most recent A1c of 6.7 % (goal < 7 %).   - Continue all meds under the continuation of care with the referring provider and clinical pharmacy team.  - Initiate ozempic 0.25 mg once weekly.     Clinical Pharmacist follow up: 1 week    Thank you,   Cammie Trinh, PharmD     Verbal consent to manage patient's drug therapy was obtained from the patient. They were informed they may decline to participate or withdraw from participation in pharmacy services at any time.

## 2024-04-17 ENCOUNTER — SPECIALTY PHARMACY (OUTPATIENT)
Dept: PHARMACY | Facility: CLINIC | Age: 74
End: 2024-04-17

## 2024-04-17 PROCEDURE — RXMED WILLOW AMBULATORY MEDICATION CHARGE

## 2024-04-18 DIAGNOSIS — I10 ESSENTIAL HYPERTENSION: Primary | ICD-10-CM

## 2024-04-22 ENCOUNTER — PHARMACY VISIT (OUTPATIENT)
Dept: PHARMACY | Facility: CLINIC | Age: 74
End: 2024-04-22
Payer: COMMERCIAL

## 2024-04-22 ENCOUNTER — TELEMEDICINE (OUTPATIENT)
Dept: PHARMACY | Facility: HOSPITAL | Age: 74
End: 2024-04-22
Payer: MEDICARE

## 2024-04-22 DIAGNOSIS — E11.9 CONTROLLED TYPE 2 DIABETES MELLITUS WITHOUT COMPLICATION, WITHOUT LONG-TERM CURRENT USE OF INSULIN (MULTI): Primary | ICD-10-CM

## 2024-04-22 RX ORDER — AMLODIPINE AND OLMESARTAN MEDOXOMIL 5; 40 MG/1; MG/1
1 TABLET ORAL DAILY
Qty: 90 TABLET | Refills: 2 | Status: SHIPPED | OUTPATIENT
Start: 2024-04-22

## 2024-04-22 NOTE — PROGRESS NOTES
Pharmacist Clinic: Diabetes Management  Kathy Jimenez is a 73 y.o. female was referred to Clinical Pharmacy Team for diabetes management.     Referring Provider: Christopher D'Amico, DO     HISTORY OF PRESENT ILLNESS  Patient has been well controlled, at last visit patient A1c was 7.3 %  Patient has diabetes on both sides of her family.   Patient suffers from short term memory loss, today on the phone I spoke with her and her .   Patient does not limit herself with what she eats, she has 3 well balanced meals per day. Avoids greasy/fatty/fried foods.     LAB REVIEW   Glucose   Date Value   04/10/2024 119 mg/dL (H)   10/25/2023 141 mg/dL (H)   08/10/2023 149 MG/DL (H)   08/03/2023 221 mg/dL (H)   04/16/2023 174 MG/DL (H)     Hemoglobin A1C (%)   Date Value   04/10/2024 7.3 (H)   10/25/2023 6.7 (H)   08/03/2023 7.3 (A)   05/11/2023 6.9 (A)   10/10/2022 6.4 (A)     Bicarbonate   Date Value   04/10/2024 33 mmol/L (H)   10/25/2023 29 mmol/L   08/10/2023 27 MMOL/L   08/03/2023 30 mmol/L   04/16/2023 29 MMOL/L     Urea Nitrogen   Date Value   04/10/2024 13 mg/dL   10/25/2023 12 mg/dL   08/10/2023 15 MG/DL   08/03/2023 11 mg/dL   04/16/2023 8 MG/DL     Creatinine   Date Value   04/10/2024 0.61 mg/dL   10/25/2023 0.72 mg/dL   08/10/2023 0.7 MG/DL   08/03/2023 0.66 mg/dL   04/16/2023 0.5 MG/DL     Lab Results   Component Value Date    HGBA1C 7.3 (H) 04/10/2024    HGBA1C 6.7 (H) 10/25/2023    HGBA1C 7.3 (A) 08/03/2023     Lab Results   Component Value Date    CHOL 131 04/10/2024    CHOL 131 10/25/2023    CHOL 140 08/03/2023     Lab Results   Component Value Date    HDL 50.0 04/10/2024    HDL 44.6 10/25/2023    HDL 53.4 08/03/2023     Lab Results   Component Value Date    LDLCALC 56 04/10/2024    LDLCALC 58 10/25/2023     Lab Results   Component Value Date    TRIG 125 04/10/2024    TRIG 142 10/25/2023    TRIG 178 (H) 08/03/2023       DIABETES ASSESSMENT    CURRENT PHARMACOTHERAPY  - metformin  mg take 2  tablets by mouth every day  - jardiance 25 mg once daily    SECONDARY PREVENTION  - Statin? Yes  - ACE-I/ARB? Yes  - Aspirin? No    SMBG MEASUREMENTS: patient tests 3 times per day, readings from     DISCUSSION:   A1c is elevated above 7%   Start ozempic 0.25 mg once weekly  Counseled patient on administration, moa, expectations, etc   Answered all questions and concerns   Stop metformin once you start ozempic     RECOMMENDATIONS/PLAN  1. Patients diabetes is worsening with most recent A1c of 6.7 % (goal < 7 %).   - Continue all meds under the continuation of care with the referring provider and clinical pharmacy team.  - Initiate ozempic 0.25 mg once weekly.   - Discontinue metformin.     Clinical Pharmacist follow up: 2 weeks   PAP Approval: 4/17/2024    Thank you,   Cammie Trinh, PharmD     Verbal consent to manage patient's drug therapy was obtained from the patient. They were informed they may decline to participate or withdraw from participation in pharmacy services at any time.

## 2024-05-01 ENCOUNTER — TELEPHONE (OUTPATIENT)
Dept: PRIMARY CARE | Facility: CLINIC | Age: 74
End: 2024-05-01
Payer: MEDICARE

## 2024-05-01 DIAGNOSIS — I25.10 CORONARY ARTERY DISEASE INVOLVING NATIVE HEART WITHOUT ANGINA PECTORIS, UNSPECIFIED VESSEL OR LESION TYPE: Primary | ICD-10-CM

## 2024-05-01 NOTE — TELEPHONE ENCOUNTER
Result Communication    Resulted Orders   CT lung screening low dose    Narrative    Interpreted By:  Shyann Sanchez,   STUDY:  CT LUNG SCREENING LOW DOSE;  1/23/2024 2:06 pm      INDICATION:  Signs/Symptoms:screen.      COMPARISON:  CT dated 09/27/2021      ACCESSION NUMBER(S):  [id number]      ORDERING CLINICIAN:  CHRISTOPHER D'AMICO      TECHNIQUE:  Helical data acquisition of the chest was obtained without IV  contrast material.  Images were reformatted in axial, coronal, and  sagittal planes.      FINDINGS:  LUNGS AND AIRWAYS:  The trachea and central airways are patent. No endobronchial lesion  is seen.      There is mild-to-moderate bilateral upper lung predominant  centrilobular and paraseptal emphysema.There is no focal  consolidation, pleural effusion, or pneumothorax.      3 mm right upper lobe nodule, image 82/294.  3 mm right lower lobe nodular density, image 197/294, likely a mucous  Couple of 3 mm left lower lobe nodular densities, image 192/294,  stable.      MEDIASTINUM AND DANIEL, LOWER NECK AND AXILLA:  The thyroid gland is not clearly seen  No evidence of thoracic lymphadenopathy by CT criteria.  Esophagus appears within normal limits as seen.      HEART AND VESSELS:  The thoracic aorta normal in course and caliber.There are  moderate  to severe calcified atherosclerosis present. Main pulmonary artery  and its branches are normal in caliber. Severe coronary artery  calcifications are seen. Please note,the study is not optimized for  evaluation of coronary arteries. The cardiac chambers are not  enlarged. There is no pericardial effusion seen.      UPPER ABDOMEN:  The visualized subdiaphragmatic structures demonstrate no remarkable  findings.              CHEST WALL AND OSSEOUS STRUCTURES:  Chest wall is within normal limits.  No acute osseous pathology.There are no suspicious osseous lesions.        Impression    1.  Few small bilateral noncalcified pulmonary nodules measuring up  to 3  mm, as described above. Continued screening with low-dose  noncontrast chest CT in 12 months (from current date) is recommended.  2. Mild to moderate upper lung predominant emphysema.  3. Severe coronary artery calcification. Correlate with coronary  artery disease risk factors.          LUNG RADS CATEGORY:  Lung Rad: Lung-RADS 2 (Benign Appearance or Indolent Behavior)      Recommendation: Continue annual screening with Low Dose Chest CT in  12 months, recommended as per American College of Radiology  Guidelines Lung-RADS Version 2022.          MACRO:  None      Signed by: Shyann Rose 1/23/2024 10:21 PM  Dictation workstation:   [id number]       10:40 AM      Results were successfully communicated with the patient and they acknowledged their understanding.

## 2024-05-01 NOTE — RESULT ENCOUNTER NOTE
Few small pulmonary nodules, thought to be benign, recommended continued 1 year screening    Mild to moderate emphysema.  Very important to quit smoking if still doing so.    Severe coronary artery calcifications, continue with atorvastatin.  Should be on daily aspirin 81 mg.  I would recommend seeing cardiology to see if further evaluation and treatment is necessary.  Referral ordered.

## 2024-05-06 ENCOUNTER — TELEMEDICINE (OUTPATIENT)
Dept: PHARMACY | Facility: HOSPITAL | Age: 74
End: 2024-05-06
Payer: MEDICARE

## 2024-05-06 ENCOUNTER — OFFICE VISIT (OUTPATIENT)
Dept: CARDIOLOGY | Facility: CLINIC | Age: 74
End: 2024-05-06
Payer: MEDICARE

## 2024-05-06 VITALS
HEIGHT: 65 IN | OXYGEN SATURATION: 99 % | BODY MASS INDEX: 18.33 KG/M2 | SYSTOLIC BLOOD PRESSURE: 118 MMHG | HEART RATE: 88 BPM | DIASTOLIC BLOOD PRESSURE: 71 MMHG | WEIGHT: 110 LBS

## 2024-05-06 DIAGNOSIS — R93.1 ABNORMAL SCREENING CARDIAC CT: Primary | ICD-10-CM

## 2024-05-06 DIAGNOSIS — E11.9 CONTROLLED TYPE 2 DIABETES MELLITUS WITHOUT COMPLICATION, WITHOUT LONG-TERM CURRENT USE OF INSULIN (MULTI): Primary | ICD-10-CM

## 2024-05-06 DIAGNOSIS — E78.01 FAMILIAL HYPERCHOLESTEROLEMIA: ICD-10-CM

## 2024-05-06 DIAGNOSIS — I10 ESSENTIAL HYPERTENSION: ICD-10-CM

## 2024-05-06 PROCEDURE — 3048F LDL-C <100 MG/DL: CPT | Performed by: NURSE PRACTITIONER

## 2024-05-06 PROCEDURE — 99214 OFFICE O/P EST MOD 30 MIN: CPT | Performed by: NURSE PRACTITIONER

## 2024-05-06 PROCEDURE — 1159F MED LIST DOCD IN RCRD: CPT | Performed by: NURSE PRACTITIONER

## 2024-05-06 PROCEDURE — 1160F RVW MEDS BY RX/DR IN RCRD: CPT | Performed by: NURSE PRACTITIONER

## 2024-05-06 PROCEDURE — 93005 ELECTROCARDIOGRAM TRACING: CPT | Performed by: NURSE PRACTITIONER

## 2024-05-06 PROCEDURE — 93010 ELECTROCARDIOGRAM REPORT: CPT | Performed by: INTERNAL MEDICINE

## 2024-05-06 PROCEDURE — 3074F SYST BP LT 130 MM HG: CPT | Performed by: NURSE PRACTITIONER

## 2024-05-06 PROCEDURE — 3051F HG A1C>EQUAL 7.0%<8.0%: CPT | Performed by: NURSE PRACTITIONER

## 2024-05-06 PROCEDURE — 3078F DIAST BP <80 MM HG: CPT | Performed by: NURSE PRACTITIONER

## 2024-05-06 RX ORDER — ASPIRIN 81 MG/1
81 TABLET ORAL DAILY
COMMUNITY

## 2024-05-06 NOTE — PROGRESS NOTES
Subjective   Kathy Jimenez is a 73 y.o. female.    Chief Complaint:  Coronary Artery Disease, Hypertension, and Hyperlipidemia    Mrs. Jimenez returns for an interval follow up. She had a recent CT lung screening which showed severe coronary artery calcifications and it was recommended she follow up with us. She has known coronary calcifications (elevated CACS 950.1 in 2020). She remains fairly active with her biggest limitation being arthritis pain. She denies any chest pain or shortness of breath at her current activity level. She denies any recent ER visits or hospitalizations. She offers no specific cardiovascular complaints or concerns today. She denies any complaints of chest pain, shortness of breath, lightheadedness, dizziness, palpitations, syncope, orthopnea, paroxysmal nocturnal dyspnea, lower extremity swelling or bleeding concerns.          Review of Systems   All other systems reviewed and are negative.      Objective   Physical Exam  Constitutional:       Appearance: Healthy appearance. In no distress  Pulmonary:      Effort: Pulmonary effort is normal.      Breath sounds: Normal breath sounds.   Cardiovascular:      Normal rate. Regular rhythm. Normal S1. Normal S2.       Murmurs: There is no murmur.      Carotids: right carotid pulse +2, no bruit heard over the right carotid. left carotid pulse +2, no bruit heard over the left carotid.  Edema:     Peripheral edema absent.   Abdominal:      Palpations: Abdomen is soft.   Musculoskeletal:       Cervical back: Normal range of motion.   Skin:     General: Skin is warm and dry. Normal color and pigmentation   Neurological:      Mental Status: Alert and oriented to person, place and time.   Psychiatric:     Mood and Affect: appropriate mood and appropriate affect.     EKG obtained and reviewed. Normal sinus rhythm. HR 73      Lab Review:   Lab Results   Component Value Date     04/10/2024    K 4.6 04/10/2024    CL 98 04/10/2024    CO2 33 (H)  04/10/2024    BUN 13 04/10/2024    CREATININE 0.61 04/10/2024    GLUCOSE 119 (H) 04/10/2024    CALCIUM 9.9 04/10/2024     Lab Results   Component Value Date    WBC 10.8 04/10/2024    HGB 14.5 04/10/2024    HCT 45.1 04/10/2024    MCV 93 04/10/2024     04/10/2024     Lab Results   Component Value Date    CHOL 131 04/10/2024    TRIG 125 04/10/2024    HDL 50.0 04/10/2024       Assessment/Plan   Mrs. Jimenez is a pleasant 73-year-old  female with a past medical history significant for hypertension, hyperlipidemia, nicotine dependence, diabetes, short term memory loss and elevated CACS 950.1 in 2020. NM stress test in 2020 was negative for ischemia. Echocardiogram 3/2022 showed an EF of 60-65%. She presents today following CT lung screening which showed severe coronary artery calcifications, which is not new. Her VS and EKG remain stable. She remains on appropriate antiplatelet and lipid lowering therapy with her LDL at goal. I will have her continue all medications unchanged. She was encouraged to continue to remain active. She was also highly encouraged to work on her smoking cessation. She will follow up with us as scheduled. She knows to call for any concerns.

## 2024-05-06 NOTE — PROGRESS NOTES
Pharmacist Clinic: Diabetes Management  Kathy Jimenez is a 73 y.o. female was referred to Clinical Pharmacy Team for diabetes management.     Referring Provider: Christopher D'Amico, DO     HISTORY OF PRESENT ILLNESS  Patient has been well controlled, at last visit patient A1c was 7.3 %  Patient has diabetes on both sides of her family.   Patient suffers from short term memory loss, today on the phone I spoke with her and her .   Patient does not limit herself with what she eats, she has 3 well balanced meals per day. Avoids greasy/fatty/fried foods.     LAB REVIEW   Glucose   Date Value   04/10/2024 119 mg/dL (H)   10/25/2023 141 mg/dL (H)   08/10/2023 149 MG/DL (H)   08/03/2023 221 mg/dL (H)   04/16/2023 174 MG/DL (H)     Hemoglobin A1C (%)   Date Value   04/10/2024 7.3 (H)   10/25/2023 6.7 (H)   08/03/2023 7.3 (A)   05/11/2023 6.9 (A)   10/10/2022 6.4 (A)     Bicarbonate   Date Value   04/10/2024 33 mmol/L (H)   10/25/2023 29 mmol/L   08/10/2023 27 MMOL/L   08/03/2023 30 mmol/L   04/16/2023 29 MMOL/L     Urea Nitrogen   Date Value   04/10/2024 13 mg/dL   10/25/2023 12 mg/dL   08/10/2023 15 MG/DL   08/03/2023 11 mg/dL   04/16/2023 8 MG/DL     Creatinine   Date Value   04/10/2024 0.61 mg/dL   10/25/2023 0.72 mg/dL   08/10/2023 0.7 MG/DL   08/03/2023 0.66 mg/dL   04/16/2023 0.5 MG/DL     Lab Results   Component Value Date    HGBA1C 7.3 (H) 04/10/2024    HGBA1C 6.7 (H) 10/25/2023    HGBA1C 7.3 (A) 08/03/2023     Lab Results   Component Value Date    CHOL 131 04/10/2024    CHOL 131 10/25/2023    CHOL 140 08/03/2023     Lab Results   Component Value Date    HDL 50.0 04/10/2024    HDL 44.6 10/25/2023    HDL 53.4 08/03/2023     Lab Results   Component Value Date    LDLCALC 56 04/10/2024    LDLCALC 58 10/25/2023     Lab Results   Component Value Date    TRIG 125 04/10/2024    TRIG 142 10/25/2023    TRIG 178 (H) 08/03/2023       DIABETES ASSESSMENT    CURRENT PHARMACOTHERAPY  - jardiance 25 mg once daily  -  ozempic 0.25 mg once weekly (Fridays)    SECONDARY PREVENTION  - Statin? Yes  - ACE-I/ARB? Yes  - Aspirin? No    SMBG MEASUREMENTS: patient tests 3 times per day, readings from     HISTORICAL PHARMACOTHERAPY:   - metformin  mg take 2 tablets by mouth every day    DISCUSSION:   Patient is tolerating current medication regimen   She denies any side effects of ozempic, she reports she has not noticed any changes since starting   Discussed continuing on current dose, we will evaluate in 2 weeks to determine need for dosage increase     RECOMMENDATIONS/PLAN  1. Patients diabetes is worsening with most recent A1c of 7.3 % (goal < 7 %).   - Continue all meds under the continuation of care with the referring provider and clinical pharmacy team.    Clinical Pharmacist follow up: 2 weeks; discuss increasing ozempic   [facility] PAP Approval: 4/17/2024    Thank you,   Cammie Trinh, PharmD     Verbal consent to manage patient's drug therapy was obtained from the patient. They were informed they may decline to participate or withdraw from participation in pharmacy services at any time.

## 2024-05-09 LAB
ATRIAL RATE: 73 BPM
P AXIS: 65 DEGREES
P OFFSET: 198 MS
P ONSET: 147 MS
PR INTERVAL: 148 MS
Q ONSET: 221 MS
QRS COUNT: 12 BEATS
QRS DURATION: 82 MS
QT INTERVAL: 366 MS
QTC CALCULATION(BAZETT): 403 MS
QTC FREDERICIA: 391 MS
R AXIS: 50 DEGREES
T AXIS: 33 DEGREES
T OFFSET: 404 MS
VENTRICULAR RATE: 73 BPM

## 2024-05-13 PROCEDURE — RXMED WILLOW AMBULATORY MEDICATION CHARGE

## 2024-05-15 ENCOUNTER — PHARMACY VISIT (OUTPATIENT)
Dept: PHARMACY | Facility: CLINIC | Age: 74
End: 2024-05-15
Payer: COMMERCIAL

## 2024-05-20 ENCOUNTER — TELEMEDICINE (OUTPATIENT)
Dept: PHARMACY | Facility: HOSPITAL | Age: 74
End: 2024-05-20
Payer: MEDICARE

## 2024-05-20 DIAGNOSIS — I10 HYPERTENSION, UNSPECIFIED TYPE: ICD-10-CM

## 2024-05-20 DIAGNOSIS — E11.9 CONTROLLED TYPE 2 DIABETES MELLITUS WITHOUT COMPLICATION, WITHOUT LONG-TERM CURRENT USE OF INSULIN (MULTI): Primary | ICD-10-CM

## 2024-05-20 DIAGNOSIS — I10 ESSENTIAL HYPERTENSION: Primary | ICD-10-CM

## 2024-05-20 NOTE — PROGRESS NOTES
Pharmacist Clinic: Diabetes Management  Kathy Jimenez is a 73 y.o. female was referred to Clinical Pharmacy Team for diabetes management.     Referring Provider: Christopher D'Amico, DO     HISTORY OF PRESENT ILLNESS  Patient has been well controlled, at last visit patient A1c was 7.3 %  Patient has diabetes on both sides of her family.   Patient suffers from short term memory loss, today on the phone I spoke with her and her .   Patient does not limit herself with what she eats, she has 3 well balanced meals per day. Avoids greasy/fatty/fried foods.     LAB REVIEW   Glucose   Date Value   04/10/2024 119 mg/dL (H)   10/25/2023 141 mg/dL (H)   08/10/2023 149 MG/DL (H)   08/03/2023 221 mg/dL (H)   04/16/2023 174 MG/DL (H)     Hemoglobin A1C (%)   Date Value   04/10/2024 7.3 (H)   10/25/2023 6.7 (H)   08/03/2023 7.3 (A)   05/11/2023 6.9 (A)   10/10/2022 6.4 (A)     Bicarbonate   Date Value   04/10/2024 33 mmol/L (H)   10/25/2023 29 mmol/L   08/10/2023 27 MMOL/L   08/03/2023 30 mmol/L   04/16/2023 29 MMOL/L     Urea Nitrogen   Date Value   04/10/2024 13 mg/dL   10/25/2023 12 mg/dL   08/10/2023 15 MG/DL   08/03/2023 11 mg/dL   04/16/2023 8 MG/DL     Creatinine   Date Value   04/10/2024 0.61 mg/dL   10/25/2023 0.72 mg/dL   08/10/2023 0.7 MG/DL   08/03/2023 0.66 mg/dL   04/16/2023 0.5 MG/DL     Lab Results   Component Value Date    HGBA1C 7.3 (H) 04/10/2024    HGBA1C 6.7 (H) 10/25/2023    HGBA1C 7.3 (A) 08/03/2023     Lab Results   Component Value Date    CHOL 131 04/10/2024    CHOL 131 10/25/2023    CHOL 140 08/03/2023     Lab Results   Component Value Date    HDL 50.0 04/10/2024    HDL 44.6 10/25/2023    HDL 53.4 08/03/2023     Lab Results   Component Value Date    LDLCALC 56 04/10/2024    LDLCALC 58 10/25/2023     Lab Results   Component Value Date    TRIG 125 04/10/2024    TRIG 142 10/25/2023    TRIG 178 (H) 08/03/2023       DIABETES ASSESSMENT    CURRENT PHARMACOTHERAPY  - jardiance 25 mg once daily  -  ozempic 0.25 mg once weekly (Fridays)    SECONDARY PREVENTION  - Statin? Yes  - ACE-I/ARB? Yes  - Aspirin? No    SMBG MEASUREMENTS: patient tests 3 times per day, readings from     HISTORICAL PHARMACOTHERAPY:   - metformin  mg take 2 tablets by mouth every day    DISCUSSION:   Patient is tolerating current medication regimen   She denies any side effects of ozempic, she reports she has not noticed any changes since starting   Discussed increasing to 0.5 mg once weekly   Counseled patient   Answered all questions and concerns    RECOMMENDATIONS/PLAN  1. Patients diabetes is worsening with most recent A1c of 7.3 % (goal < 7 %).   - Continue all meds under the continuation of care with the referring provider and clinical pharmacy team.  - Increase ozempic to 0.5 mg under the skin once weekly     Clinical Pharmacist follow up: 2 weeks; refill ozempic    PAP Approval: 4/17/2024    Thank you,   Cammie Trinh, PharmD     Verbal consent to manage patient's drug therapy was obtained from the patient. They were informed they may decline to participate or withdraw from participation in pharmacy services at any time.

## 2024-05-21 RX ORDER — CLONIDINE HYDROCHLORIDE 0.1 MG/1
0.1 TABLET ORAL 2 TIMES DAILY
Qty: 60 TABLET | Refills: 11 | Status: SHIPPED | OUTPATIENT
Start: 2024-05-21

## 2024-05-23 ENCOUNTER — OFFICE VISIT (OUTPATIENT)
Dept: OPHTHALMOLOGY | Facility: CLINIC | Age: 74
End: 2024-05-23
Payer: MEDICARE

## 2024-05-23 DIAGNOSIS — H52.4 BILATERAL PRESBYOPIA: ICD-10-CM

## 2024-05-23 DIAGNOSIS — H04.123 DRY EYE SYNDROME OF BOTH LACRIMAL GLANDS: ICD-10-CM

## 2024-05-23 DIAGNOSIS — H35.031: ICD-10-CM

## 2024-05-23 DIAGNOSIS — H43.813 POSTERIOR VITREOUS DETACHMENT OF BOTH EYES: ICD-10-CM

## 2024-05-23 DIAGNOSIS — Z96.1 PRESENCE OF ARTIFICIAL INTRA-OCULAR LENS: ICD-10-CM

## 2024-05-23 DIAGNOSIS — H52.13 BILATERAL MYOPIA: ICD-10-CM

## 2024-05-23 DIAGNOSIS — E11.9 TYPE 2 DIABETES MELLITUS WITHOUT RETINOPATHY (MULTI): Primary | ICD-10-CM

## 2024-05-23 PROCEDURE — 92015 DETERMINE REFRACTIVE STATE: CPT | Performed by: STUDENT IN AN ORGANIZED HEALTH CARE EDUCATION/TRAINING PROGRAM

## 2024-05-23 PROCEDURE — 92014 COMPRE OPH EXAM EST PT 1/>: CPT | Performed by: STUDENT IN AN ORGANIZED HEALTH CARE EDUCATION/TRAINING PROGRAM

## 2024-05-23 ASSESSMENT — VISUAL ACUITY
OS_CC: 20/25
OD_CC: 20/20
METHOD: SNELLEN - LINEAR
OS_CC+: +2
CORRECTION_TYPE: GLASSES
OD_CC+: -2

## 2024-05-23 ASSESSMENT — ENCOUNTER SYMPTOMS
EYES NEGATIVE: 1
NEUROLOGICAL NEGATIVE: 0
MUSCULOSKELETAL NEGATIVE: 0
ENDOCRINE NEGATIVE: 0
GASTROINTESTINAL NEGATIVE: 0
PSYCHIATRIC NEGATIVE: 0
CARDIOVASCULAR NEGATIVE: 0
ALLERGIC/IMMUNOLOGIC NEGATIVE: 0
RESPIRATORY NEGATIVE: 0
HEMATOLOGIC/LYMPHATIC NEGATIVE: 0
CONSTITUTIONAL NEGATIVE: 0

## 2024-05-23 ASSESSMENT — REFRACTION_MANIFEST
OD_CYLINDER: -0.75
OS_ADD: +2.75
OS_AXIS: 176
OS_AXIS: 083
OS_SPHERE: -2.00
OS_CYLINDER: -1.25
OD_ADD: +2.75
OD_SPHERE: -1.75
OD_AXIS: 098
METHOD_AUTOREFRACTION: 1
OD_AXIS: 017
OS_SPHERE: -0.75
OD_CYLINDER: +0.50
OS_CYLINDER: +1.75
OD_SPHERE: -2.50

## 2024-05-23 ASSESSMENT — REFRACTION_WEARINGRX
OS_SPHERE: -2.25
OD_SPHERE: -2.50
OS_AXIS: 173
OS_ADD: +3.00
OS_CYLINDER: +1.25
OD_CYLINDER: SPHERE
OD_ADD: +3.00

## 2024-05-23 ASSESSMENT — CONF VISUAL FIELD
OD_SUPERIOR_NASAL_RESTRICTION: 0
METHOD: COUNTING FINGERS
OD_INFERIOR_NASAL_RESTRICTION: 0
OD_INFERIOR_TEMPORAL_RESTRICTION: 0
OS_SUPERIOR_NASAL_RESTRICTION: 0
OS_SUPERIOR_TEMPORAL_RESTRICTION: 0
OD_NORMAL: 1
OD_SUPERIOR_TEMPORAL_RESTRICTION: 0
OS_INFERIOR_TEMPORAL_RESTRICTION: 0
OS_INFERIOR_NASAL_RESTRICTION: 0
OS_NORMAL: 1

## 2024-05-23 ASSESSMENT — TONOMETRY
OD_IOP_MMHG: 12
OS_IOP_MMHG: 12
IOP_METHOD: GOLDMANN APPLANATION

## 2024-05-23 ASSESSMENT — EXTERNAL EXAM - RIGHT EYE: OD_EXAM: NORMAL

## 2024-05-23 ASSESSMENT — CUP TO DISC RATIO
OS_RATIO: .1
OD_RATIO: .1

## 2024-05-23 ASSESSMENT — EXTERNAL EXAM - LEFT EYE: OS_EXAM: NORMAL

## 2024-05-23 NOTE — PROGRESS NOTES
Assessment/Plan   Diagnoses and all orders for this visit:  Type 2 diabetes mellitus without retinopathy (Multi)  -no retinopathy observed on exam today od/os, pt ed to continue good BGlc, blood pressure and lipid control, rtc with any changes in vision, otherwise monitor 1 year  Bilateral myopia  Bilateral presbyopia  Presence of artificial intra-ocular lens  Change in MRX today accepted on trial frame with improvement in BCVA OD   BCVA 20/20 OD+OS  Posterior vitreous detachment of both eyes  Stable retinal exam  Dry eye syndrome of both lacrimal glands  Continue with systane AT's BID  Hypertensive retinopathy of right eye, grade 1  -hypertensive changes noted OD>OS with isolated splinter heme  -advised continue blood pressure mgmt as directed with health care team    RTC 6 months for DFE to monitor for HTN ret

## 2024-05-28 PROCEDURE — RXMED WILLOW AMBULATORY MEDICATION CHARGE

## 2024-05-30 ENCOUNTER — PHARMACY VISIT (OUTPATIENT)
Dept: PHARMACY | Facility: CLINIC | Age: 74
End: 2024-05-30
Payer: COMMERCIAL

## 2024-05-30 DIAGNOSIS — F32.A DEPRESSION, UNSPECIFIED DEPRESSION TYPE: ICD-10-CM

## 2024-05-30 DIAGNOSIS — E78.5 HYPERLIPIDEMIA, UNSPECIFIED HYPERLIPIDEMIA TYPE: ICD-10-CM

## 2024-06-03 ENCOUNTER — TELEMEDICINE (OUTPATIENT)
Dept: PHARMACY | Facility: HOSPITAL | Age: 74
End: 2024-06-03
Payer: MEDICARE

## 2024-06-03 DIAGNOSIS — E11.9 CONTROLLED TYPE 2 DIABETES MELLITUS WITHOUT COMPLICATION, WITHOUT LONG-TERM CURRENT USE OF INSULIN (MULTI): ICD-10-CM

## 2024-06-03 DIAGNOSIS — E11.9 TYPE 2 DIABETES MELLITUS WITHOUT COMPLICATION, WITHOUT LONG-TERM CURRENT USE OF INSULIN (MULTI): ICD-10-CM

## 2024-06-03 RX ORDER — BUPROPION HYDROCHLORIDE 150 MG/1
150 TABLET ORAL DAILY
Qty: 90 TABLET | Refills: 3 | Status: SHIPPED | OUTPATIENT
Start: 2024-06-03 | End: 2025-06-03

## 2024-06-03 RX ORDER — ATORVASTATIN CALCIUM 20 MG/1
20 TABLET, FILM COATED ORAL DAILY
Qty: 90 TABLET | Refills: 3 | Status: SHIPPED | OUTPATIENT
Start: 2024-06-03

## 2024-06-03 NOTE — PROGRESS NOTES
Pharmacist Clinic: Diabetes Management  Kathy Jimenez is a 73 y.o. female was referred to Clinical Pharmacy Team for diabetes management.     Referring Provider: Christopher D'Amico, DO     HISTORY OF PRESENT ILLNESS  Patient has been well controlled, at last visit patient A1c was 7.3 %  Patient has diabetes on both sides of her family.   Patient suffers from short term memory loss, today on the phone I spoke with her and her .   Patient does not limit herself with what she eats, she has 3 well balanced meals per day. Avoids greasy/fatty/fried foods.     LAB REVIEW   Glucose   Date Value   04/10/2024 119 mg/dL (H)   10/25/2023 141 mg/dL (H)   08/10/2023 149 MG/DL (H)   08/03/2023 221 mg/dL (H)   04/16/2023 174 MG/DL (H)     Hemoglobin A1C (%)   Date Value   04/10/2024 7.3 (H)   10/25/2023 6.7 (H)   08/03/2023 7.3 (A)   05/11/2023 6.9 (A)   10/10/2022 6.4 (A)     Bicarbonate   Date Value   04/10/2024 33 mmol/L (H)   10/25/2023 29 mmol/L   08/10/2023 27 MMOL/L   08/03/2023 30 mmol/L   04/16/2023 29 MMOL/L     Urea Nitrogen   Date Value   04/10/2024 13 mg/dL   10/25/2023 12 mg/dL   08/10/2023 15 MG/DL   08/03/2023 11 mg/dL   04/16/2023 8 MG/DL     Creatinine   Date Value   04/10/2024 0.61 mg/dL   10/25/2023 0.72 mg/dL   08/10/2023 0.7 MG/DL   08/03/2023 0.66 mg/dL   04/16/2023 0.5 MG/DL     Lab Results   Component Value Date    HGBA1C 7.3 (H) 04/10/2024    HGBA1C 6.7 (H) 10/25/2023    HGBA1C 7.3 (A) 08/03/2023     Lab Results   Component Value Date    CHOL 131 04/10/2024    CHOL 131 10/25/2023    CHOL 140 08/03/2023     Lab Results   Component Value Date    HDL 50.0 04/10/2024    HDL 44.6 10/25/2023    HDL 53.4 08/03/2023     Lab Results   Component Value Date    LDLCALC 56 04/10/2024    LDLCALC 58 10/25/2023     Lab Results   Component Value Date    TRIG 125 04/10/2024    TRIG 142 10/25/2023    TRIG 178 (H) 08/03/2023       DIABETES ASSESSMENT    CURRENT PHARMACOTHERAPY  - jardiance 25 mg once daily  -  ozempic 0.5 mg once weekly (Fridays)    SECONDARY PREVENTION  - Statin? Yes  - ACE-I/ARB? Yes  - Aspirin? No    SMBG MEASUREMENTS: patient tests 3 times per day, readings from     HISTORICAL PHARMACOTHERAPY:   - metformin  mg take 2 tablets by mouth every day    DISCUSSION:   Patient is tolerating current medication regimen   She denies any side effects of ozempic, she reports she has not noticed any changes since starting however her blood glucose has come down     RECOMMENDATIONS/PLAN  1. Patients diabetes is worsening with most recent A1c of 7.3 % (goal < 7 %).   - Continue all meds under the continuation of care with the referring provider and clinical pharmacy team.    Clinical Pharmacist follow up: 3 months   PAP Approval: 4/17/2024    Thank you,   Cammie Trinh, PharmD     Verbal consent to manage patient's drug therapy was obtained from the patient. They were informed they may decline to participate or withdraw from participation in pharmacy services at any time.

## 2024-06-04 RX ORDER — BLOOD-GLUCOSE METER
EACH MISCELLANEOUS
Qty: 100 EACH | Refills: 3 | Status: SHIPPED | OUTPATIENT
Start: 2024-06-04

## 2024-06-05 NOTE — PROGRESS NOTES
Subjective   Patient ID: Kathy Jmienez is a 73 y.o. female who presents for pessary maintenance.       HPI  73-year-old with stage II uterovaginal prolapse, urinary urgency and frequency, vaginal atrophy, mild pelvic floor pain and weakness having been fitted with a 2.25 inch Gellhorn short stem placed 9/22/2023    The patient appears to be having excellent results with her Gellhorn pessary, however notes that when she has constipation and trying to push out it feels that her pessary is coming out. .She denies any prolapse complaints . She denies any vaginal complaints, no abnormal bleeding or discharge.     She continues to note urinary urgency during the daytime every 1-2 hours and need to double void.   She has noted improvements in her nocturia complaints. She now is waking up once nightly and denies any enuresis. She denies any UTI like symptoms.    She does continue to note some constipation alternating with diarrhea, she does utilize prune juice and metamucil.    She otherwise denies any other complaints    .From Previous note  73-year-old with stage II uterovaginal prolapse, urinary urgency and frequency, vaginal atrophy, mild pelvic floor pain and weakness having been fitted with a 2.25 inch Gellhorn short stem 9/22/2023    The patient appears to be having excellent results with her Gellhorn pessary, however notes that when she has constipation and trying to push out it feels that her pessary is coming out. .She denies any prolapse complaints . She denies any vaginal complaints, no abnormal bleeding or discharge.     She has noted improvements in her nocturia complaints. She now is waking up once nightly and denies any enuresis. She continues to note urinary urgency during the daytime every 1-2 hours.      She does continue to note some constipation alternating with diarrhea, she does utilize prune juice and metamucil.    She otherwise denies any other complaints  .  From Previous note  73-year-old  with stage II uterovaginal prolapse, urinary urgency and frequency, vaginal atrophy, mild pelvic floor pain and weakness having been fitted with a 2.25 inch Gellhorn short stem 9/22/2023.     The patient appears to be having excellent results with her Gellhorn pessary. She denies any prolapse complaints and has noted improvements in her nocturia complaints. She now is waking up once nightly and denies any enuresis. She continues to note urinary urgency during the daytime every 1-2 hours. She otherwise denies any other complaints.     She does continue to note some constipation.     From previous note  73-year-old with stage II uterovaginal prolapse, urinary urgency and frequency, vaginal atrophy, mild pelvic floor pain and weakness presenting for pessary fitting.     She has no other complaints.      From previous note  73 - year-old patient presenting as a referral from Dr. D'Amico with complaints of vaginal bulge, urinary urgency and frequency      The patient presents with complaints of urinary urgency and frequency. She notes 1-4 episodes of nocturia but denies any enuresis. She voids every 30 minutes during the day with a sense of urgency. She does have a sensation of incomplete emptying and has to strain. She denies leaking on laughing, cough or sneezing. She denies any history of nephrolithiasis, gross hematuria or chronic recurrent UTIs. She is also a daily smoker.      She also notes a vaginal lump/bulge since July 2023, she notes this when she wipes. She states this lump/bulge comes and goes. Sis not sexually active and denies any abnormal vaginal bleeding or discharge. She denies any vaginal dryness or irritation.      She does note loose stool alternating with constipation but has regular bowel movement. She denies any fecal or flatal incontinence. She has a history of colon polyps and gets surveillance colonoscopies every 3 years.      She has no other complaints.    Review of Systems  Constitutional:  No fever, No chills and No fatigue.   Eyes: No vision problems and No dryness of the eyes.   ENT: No dry mouth, No hearing loss and No nosebleeds.   Cardiovascular: No chest pain, No palpitations and No orthopnea.   Respiratory: No shortness of breath, No cough and No wheezing.   Gastrointestinal: No abdominal pain, No constipation, No nausea, No diarrhea, No vomiting and No melena.   Genitourinary: As noted in HPI.   Musculoskeletal: No back pain, No myalgias, No muscle weakness, No joint swelling and No leg edema.   Integumentary: No rashes, No skin lesion and No itching.   Neurological: No headache, No numbness and No dizziness.   Psychiatric: No sleep disturbances, No anxiety and No depression.   Endocrine: No hot flashes, No loss of hair and No hirsutism.   Hematologic/Lymphatic: No swollen glands, No tendency for easy bleeding and No tendency for easy bruising.   All other systems have been reviewed and are negative for complaint.        Objective   Physical Exam    The patient's 2.25 inch Gellhorn short stem was grasped with ring forceps after the area was treated with lidocaine gel and removed without difficulty.  There was no evidence of any excoriations or other abnormalities.  The pessary was cleaned and replaced without difficulty.    Assessment/Plan   73-year-old with stage II uterovaginal prolapse, urinary urgency and frequency, vaginal atrophy, mild pelvic floor pain and weakness having been fitted with a 2.25 inch Gellhorn short stem placed 9/22/2023     1. The patient appears to be very satisfied with her 2.25 inch Gellhorn short stem and will continue this moving forward. Her atrophy and genital hiatus was not amenable to a ring pessary. We have previously discussed expectant management, pelvic floor physical therapy, pessary management, and briefly discussed surgery. She wishes to exhaust all medical therapies before proceeding with any definitive surgical options. She will follow-up in 4 to 5  months for pessary follow-up.     2. We discussed her lower urinary tract symptoms. She has had improvements in her nocturia complaints with her pessary in place. We did discuss the possibility for medication therapy for her daytime urgency and we will readdress this at follow-up appointments.     3. We discussed starting vaginal estrogen therapy. We discussed that safety, efficacy, proper utilization. She will continue this 3 times a week moving forward.      4. She is not interested in pelvic floor physical therapy at this time. We did discuss starting a daily fiber therapy as well as titrating a laxative to a soft bowel movement every day to 2 days.     5. The patient will follow-up in in 4 to 5 months for pessary follow-up in October/November 2024.     YULIANA Levi MD       Scribe Attestation  By signing my name below, I, Prashanth Morales attest that this documentation has been prepared under the direction and in the presence of Bryn Levi MD. All medical record entries made by the Scribe were at my direction or personally dictated by me. I have reviewed the chart and agree that the record accurately reflects my personal performance of the history, physical exam, discussion and plan.

## 2024-06-06 ENCOUNTER — OFFICE VISIT (OUTPATIENT)
Dept: UROLOGY | Facility: CLINIC | Age: 74
End: 2024-06-06
Payer: MEDICARE

## 2024-06-06 DIAGNOSIS — Z96.0 PRESENCE OF PESSARY: Primary | ICD-10-CM

## 2024-06-06 DIAGNOSIS — N95.2 VAGINAL ATROPHY: ICD-10-CM

## 2024-06-06 DIAGNOSIS — N81.4 PROLAPSE, UTEROVAGINAL: ICD-10-CM

## 2024-06-06 DIAGNOSIS — N81.89 PELVIC FLOOR WEAKNESS: ICD-10-CM

## 2024-06-06 DIAGNOSIS — R35.0 URINARY FREQUENCY: ICD-10-CM

## 2024-06-06 PROCEDURE — 3048F LDL-C <100 MG/DL: CPT | Performed by: OBSTETRICS & GYNECOLOGY

## 2024-06-06 PROCEDURE — 1159F MED LIST DOCD IN RCRD: CPT | Performed by: OBSTETRICS & GYNECOLOGY

## 2024-06-06 PROCEDURE — 99214 OFFICE O/P EST MOD 30 MIN: CPT | Performed by: OBSTETRICS & GYNECOLOGY

## 2024-06-06 PROCEDURE — 1160F RVW MEDS BY RX/DR IN RCRD: CPT | Performed by: OBSTETRICS & GYNECOLOGY

## 2024-06-06 PROCEDURE — 3051F HG A1C>EQUAL 7.0%<8.0%: CPT | Performed by: OBSTETRICS & GYNECOLOGY

## 2024-06-06 NOTE — PATIENT INSTRUCTIONS
Please continue her vaginal estrogen therapy 3 times a week.    Please follow-up in October/November 2024 for pessary maintenance.    Please continue your daily fiber therapy.    Please contact the clinic with any questions or concerns.    [phone]

## 2024-06-17 DIAGNOSIS — I10 HYPERTENSION, UNSPECIFIED TYPE: ICD-10-CM

## 2024-06-17 RX ORDER — CARVEDILOL 12.5 MG/1
12.5 TABLET ORAL 2 TIMES DAILY
Qty: 180 TABLET | Refills: 2 | Status: SHIPPED | OUTPATIENT
Start: 2024-06-17

## 2024-07-01 PROCEDURE — RXMED WILLOW AMBULATORY MEDICATION CHARGE

## 2024-07-02 ENCOUNTER — PHARMACY VISIT (OUTPATIENT)
Dept: PHARMACY | Facility: CLINIC | Age: 74
End: 2024-07-02
Payer: COMMERCIAL

## 2024-07-24 ENCOUNTER — PHARMACY VISIT (OUTPATIENT)
Dept: PHARMACY | Facility: CLINIC | Age: 74
End: 2024-07-24
Payer: COMMERCIAL

## 2024-07-24 PROCEDURE — RXMED WILLOW AMBULATORY MEDICATION CHARGE

## 2024-08-06 PROCEDURE — RXMED WILLOW AMBULATORY MEDICATION CHARGE

## 2024-08-07 ENCOUNTER — PHARMACY VISIT (OUTPATIENT)
Dept: PHARMACY | Facility: CLINIC | Age: 74
End: 2024-08-07
Payer: COMMERCIAL

## 2024-08-13 DIAGNOSIS — E03.9 HYPOTHYROIDISM, UNSPECIFIED TYPE: ICD-10-CM

## 2024-08-14 RX ORDER — LEVOTHYROXINE SODIUM 125 UG/1
125 TABLET ORAL DAILY
Qty: 90 TABLET | Refills: 0 | Status: SHIPPED | OUTPATIENT
Start: 2024-08-14

## 2024-08-20 PROCEDURE — RXMED WILLOW AMBULATORY MEDICATION CHARGE

## 2024-08-21 ENCOUNTER — PHARMACY VISIT (OUTPATIENT)
Dept: PHARMACY | Facility: CLINIC | Age: 74
End: 2024-08-21
Payer: COMMERCIAL

## 2024-08-26 ENCOUNTER — APPOINTMENT (OUTPATIENT)
Dept: PHARMACY | Facility: HOSPITAL | Age: 74
End: 2024-08-26
Payer: MEDICARE

## 2024-08-26 DIAGNOSIS — E11.9 CONTROLLED TYPE 2 DIABETES MELLITUS WITHOUT COMPLICATION, WITHOUT LONG-TERM CURRENT USE OF INSULIN (MULTI): Primary | ICD-10-CM

## 2024-08-26 NOTE — PROGRESS NOTES
Pharmacist Clinic: Diabetes Management  Kathy Jimenez is a 74 y.o. female was referred to Clinical Pharmacy Team for diabetes management.     Referring Provider: Christopher D'Amico, DO     HISTORY OF PRESENT ILLNESS  Patient has been well controlled, at last visit patient A1c was 7.3 %  Patient has diabetes on both sides of her family.   Patient suffers from short term memory loss, today on the phone I spoke with her and her .   Patient does not limit herself with what she eats, she has 3 well balanced meals per day. Avoids greasy/fatty/fried foods.     LAB REVIEW   Glucose   Date Value   04/10/2024 119 mg/dL (H)   10/25/2023 141 mg/dL (H)   08/10/2023 149 MG/DL (H)   08/03/2023 221 mg/dL (H)   04/16/2023 174 MG/DL (H)     Hemoglobin A1C (%)   Date Value   04/10/2024 7.3 (H)   10/25/2023 6.7 (H)   08/03/2023 7.3 (A)   05/11/2023 6.9 (A)   10/10/2022 6.4 (A)     Bicarbonate   Date Value   04/10/2024 33 mmol/L (H)   10/25/2023 29 mmol/L   08/10/2023 27 MMOL/L   08/03/2023 30 mmol/L   04/16/2023 29 MMOL/L     Urea Nitrogen   Date Value   04/10/2024 13 mg/dL   10/25/2023 12 mg/dL   08/10/2023 15 MG/DL   08/03/2023 11 mg/dL   04/16/2023 8 MG/DL     Creatinine   Date Value   04/10/2024 0.61 mg/dL   10/25/2023 0.72 mg/dL   08/10/2023 0.7 MG/DL   08/03/2023 0.66 mg/dL   04/16/2023 0.5 MG/DL     Lab Results   Component Value Date    HGBA1C 7.3 (H) 04/10/2024    HGBA1C 6.7 (H) 10/25/2023    HGBA1C 7.3 (A) 08/03/2023     Lab Results   Component Value Date    CHOL 131 04/10/2024    CHOL 131 10/25/2023    CHOL 140 08/03/2023     Lab Results   Component Value Date    HDL 50.0 04/10/2024    HDL 44.6 10/25/2023    HDL 53.4 08/03/2023     Lab Results   Component Value Date    LDLCALC 56 04/10/2024    LDLCALC 58 10/25/2023     Lab Results   Component Value Date    TRIG 125 04/10/2024    TRIG 142 10/25/2023    TRIG 178 (H) 08/03/2023       DIABETES ASSESSMENT    CURRENT PHARMACOTHERAPY  - jardiance 25 mg once daily  -  ozempic 0.5 mg once weekly (Fridays)    SECONDARY PREVENTION  - Statin? Yes  - ACE-I/ARB? Yes  - Aspirin? No    SMBG MEASUREMENTS:   - She reports since last speaking she has had 41 good readings and 10 bad readings     HISTORICAL PHARMACOTHERAPY:   - metformin  mg take 2 tablets by mouth every day    DISCUSSION:   Patient is tolerating current medication regimen   She denies any side effects of ozempic, she reports she has not seen as much fluctuation in her blood glucose, and her readings are stable     RECOMMENDATIONS/PLAN  1. Patients diabetes is worsening with most recent A1c of 7.3 % (goal < 7 %).   - Continue all meds under the continuation of care with the referring provider and clinical pharmacy team.  - Get updated labs     Clinical Pharmacist follow up: 2 weeks   PAP Approval: 4/17/2024    Thank you,   Cammie Trinh, PharmD     Verbal consent to manage patient's drug therapy was obtained from the patient. They were informed they may decline to participate or withdraw from participation in pharmacy services at any time.

## 2024-09-04 ENCOUNTER — LAB (OUTPATIENT)
Dept: LAB | Facility: LAB | Age: 74
End: 2024-09-04
Payer: MEDICARE

## 2024-09-04 DIAGNOSIS — E11.9 CONTROLLED TYPE 2 DIABETES MELLITUS WITHOUT COMPLICATION, WITHOUT LONG-TERM CURRENT USE OF INSULIN (MULTI): ICD-10-CM

## 2024-09-04 LAB
ANION GAP SERPL CALC-SCNC: 12 MMOL/L (ref 10–20)
BUN SERPL-MCNC: 13 MG/DL (ref 6–23)
CALCIUM SERPL-MCNC: 9.3 MG/DL (ref 8.6–10.6)
CHLORIDE SERPL-SCNC: 99 MMOL/L (ref 98–107)
CO2 SERPL-SCNC: 31 MMOL/L (ref 21–32)
CREAT SERPL-MCNC: 0.59 MG/DL (ref 0.5–1.05)
EGFRCR SERPLBLD CKD-EPI 2021: >90 ML/MIN/1.73M*2
EST. AVERAGE GLUCOSE BLD GHB EST-MCNC: 134 MG/DL
GLUCOSE SERPL-MCNC: 121 MG/DL (ref 74–99)
HBA1C MFR BLD: 6.3 %
POTASSIUM SERPL-SCNC: 4.1 MMOL/L (ref 3.5–5.3)
SODIUM SERPL-SCNC: 138 MMOL/L (ref 136–145)

## 2024-09-04 PROCEDURE — 80048 BASIC METABOLIC PNL TOTAL CA: CPT

## 2024-09-04 PROCEDURE — 36415 COLL VENOUS BLD VENIPUNCTURE: CPT

## 2024-09-04 PROCEDURE — 83036 HEMOGLOBIN GLYCOSYLATED A1C: CPT

## 2024-09-05 ENCOUNTER — TELEPHONE (OUTPATIENT)
Dept: PRIMARY CARE | Facility: CLINIC | Age: 74
End: 2024-09-05
Payer: MEDICARE

## 2024-09-05 NOTE — RESULT ENCOUNTER NOTE
Hemoglobin A1c at 6.3, remaining labs unremarkable.  Continue to follow with pharmacist, doing well.

## 2024-09-05 NOTE — TELEPHONE ENCOUNTER
----- Message from Christopher D'Amico sent at 9/5/2024 11:51 AM EDT -----  Hemoglobin A1c at 6.3, remaining labs unremarkable.  Continue to follow with pharmacist, doing well.

## 2024-09-05 NOTE — TELEPHONE ENCOUNTER
Result Communication    Resulted Orders   Hemoglobin A1C   Result Value Ref Range    Hemoglobin A1C 6.3 (H) see below %    Estimated Average Glucose 134 Not Established mg/dL    Narrative    Diagnosis of Diabetes-Adults  Non-Diabetic: < or = 5.6%  Increased risk for developing diabetes: 5.7-6.4%  Diagnostic of diabetes: > or = 6.5%       Basic metabolic panel   Result Value Ref Range    Glucose 121 (H) 74 - 99 mg/dL    Sodium 138 136 - 145 mmol/L    Potassium 4.1 3.5 - 5.3 mmol/L    Chloride 99 98 - 107 mmol/L    Bicarbonate 31 21 - 32 mmol/L    Anion Gap 12 10 - 20 mmol/L    Urea Nitrogen 13 6 - 23 mg/dL    Creatinine 0.59 0.50 - 1.05 mg/dL    eGFR >90 >60 mL/min/1.73m*2      Comment:      Calculations of estimated GFR are performed using the 2021 CKD-EPI Study Refit equation without the race variable for the IDMS-Traceable creatinine methods.  https://jasn.asnjournals.org/content/early/2021/09/22/ASN.0107508550    Calcium 9.3 8.6 - 10.6 mg/dL       2:21 PM      Results were successfully communicated with the patient and they acknowledged their understanding.

## 2024-09-10 ENCOUNTER — APPOINTMENT (OUTPATIENT)
Dept: PHARMACY | Facility: HOSPITAL | Age: 74
End: 2024-09-10
Payer: MEDICARE

## 2024-09-10 DIAGNOSIS — E11.9 CONTROLLED TYPE 2 DIABETES MELLITUS WITHOUT COMPLICATION, WITHOUT LONG-TERM CURRENT USE OF INSULIN (MULTI): ICD-10-CM

## 2024-09-10 PROCEDURE — RXMED WILLOW AMBULATORY MEDICATION CHARGE

## 2024-09-10 NOTE — PROGRESS NOTES
Pharmacist Clinic: Diabetes Management  Kathy Jimenez is a 74 y.o. female was referred to Clinical Pharmacy Team for diabetes management.     Referring Provider: Christopher D'Amico, DO     HISTORY OF PRESENT ILLNESS  Patient has been well controlled, at last visit patient A1c was 7.3 %  Patient has diabetes on both sides of her family.   Patient suffers from short term memory loss, today on the phone I spoke with her and her .   Patient does not limit herself with what she eats, she has 3 well balanced meals per day. Avoids greasy/fatty/fried foods.     LAB REVIEW   Glucose (mg/dL)   Date Value   09/04/2024 121 (H)   04/10/2024 119 (H)   10/25/2023 141 (H)     Hemoglobin A1C (%)   Date Value   09/04/2024 6.3 (H)   04/10/2024 7.3 (H)   10/25/2023 6.7 (H)     Bicarbonate (mmol/L)   Date Value   09/04/2024 31   04/10/2024 33 (H)   10/25/2023 29     Urea Nitrogen (mg/dL)   Date Value   09/04/2024 13   04/10/2024 13   10/25/2023 12     Creatinine (mg/dL)   Date Value   09/04/2024 0.59   04/10/2024 0.61   10/25/2023 0.72     Lab Results   Component Value Date    HGBA1C 6.3 (H) 09/04/2024    HGBA1C 7.3 (H) 04/10/2024    HGBA1C 6.7 (H) 10/25/2023     Lab Results   Component Value Date    CHOL 131 04/10/2024    CHOL 131 10/25/2023    CHOL 140 08/03/2023     Lab Results   Component Value Date    HDL 50.0 04/10/2024    HDL 44.6 10/25/2023    HDL 53.4 08/03/2023     Lab Results   Component Value Date    LDLCALC 56 04/10/2024    LDLCALC 58 10/25/2023     Lab Results   Component Value Date    TRIG 125 04/10/2024    TRIG 142 10/25/2023    TRIG 178 (H) 08/03/2023       DIABETES ASSESSMENT    CURRENT PHARMACOTHERAPY  - jardiance 25 mg once daily  - ozempic 0.5 mg once weekly (Fridays)    SECONDARY PREVENTION  - Statin? Yes  - ACE-I/ARB? Yes  - Aspirin? No    SMBG MEASUREMENTS:   - She reports since last speaking she has had 41 good readings and 10 bad readings     HISTORICAL PHARMACOTHERAPY:   - metformin  mg  take 2 tablets by mouth every day    DISCUSSION:   Discussed A1c came back at 6.3%, this is well below goal of 7%, congrats!   Patient is tolerating current medication regimen   She denies any side effects of ozempic, she reports she has not seen as much fluctuation in her blood glucose, and her readings are stable     RECOMMENDATIONS/PLAN  1. Patients diabetes is well controlled with most recent A1c of 6.3 % (goal < 7 %).   - Continue all meds under the continuation of care with the referring provider and clinical pharmacy team.    Clinical Pharmacist follow up: 3 months  [facility] PAP Approval: 4/17/2024    Thank you,   Cammie Trinh, PharmD     Verbal consent to manage patient's drug therapy was obtained from the patient. They were informed they may decline to participate or withdraw from participation in pharmacy services at any time.

## 2024-09-12 ENCOUNTER — PHARMACY VISIT (OUTPATIENT)
Dept: PHARMACY | Facility: CLINIC | Age: 74
End: 2024-09-12
Payer: COMMERCIAL

## 2024-10-03 ENCOUNTER — APPOINTMENT (OUTPATIENT)
Dept: UROLOGY | Facility: CLINIC | Age: 74
End: 2024-10-03
Payer: MEDICARE

## 2024-10-03 ENCOUNTER — OFFICE VISIT (OUTPATIENT)
Dept: UROLOGY | Facility: CLINIC | Age: 74
End: 2024-10-03
Payer: MEDICARE

## 2024-10-03 VITALS
WEIGHT: 103.9 LBS | HEART RATE: 88 BPM | BODY MASS INDEX: 17.29 KG/M2 | DIASTOLIC BLOOD PRESSURE: 72 MMHG | SYSTOLIC BLOOD PRESSURE: 146 MMHG

## 2024-10-03 DIAGNOSIS — R35.0 URINARY FREQUENCY: ICD-10-CM

## 2024-10-03 DIAGNOSIS — N81.4 PROLAPSE, UTEROVAGINAL: ICD-10-CM

## 2024-10-03 DIAGNOSIS — Z96.0 PRESENCE OF PESSARY: Primary | ICD-10-CM

## 2024-10-03 DIAGNOSIS — N95.2 VAGINAL ATROPHY: ICD-10-CM

## 2024-10-03 DIAGNOSIS — N81.89 PELVIC FLOOR WEAKNESS: ICD-10-CM

## 2024-10-03 PROCEDURE — 99213 OFFICE O/P EST LOW 20 MIN: CPT | Performed by: OBSTETRICS & GYNECOLOGY

## 2024-10-03 PROCEDURE — G2211 COMPLEX E/M VISIT ADD ON: HCPCS | Performed by: OBSTETRICS & GYNECOLOGY

## 2024-10-03 PROCEDURE — 1159F MED LIST DOCD IN RCRD: CPT | Performed by: OBSTETRICS & GYNECOLOGY

## 2024-10-03 PROCEDURE — 3077F SYST BP >= 140 MM HG: CPT | Performed by: OBSTETRICS & GYNECOLOGY

## 2024-10-03 PROCEDURE — 3078F DIAST BP <80 MM HG: CPT | Performed by: OBSTETRICS & GYNECOLOGY

## 2024-10-03 PROCEDURE — 1160F RVW MEDS BY RX/DR IN RCRD: CPT | Performed by: OBSTETRICS & GYNECOLOGY

## 2024-10-03 PROCEDURE — 3044F HG A1C LEVEL LT 7.0%: CPT | Performed by: OBSTETRICS & GYNECOLOGY

## 2024-10-03 PROCEDURE — 3048F LDL-C <100 MG/DL: CPT | Performed by: OBSTETRICS & GYNECOLOGY

## 2024-10-03 RX ORDER — ESTRADIOL 0.1 MG/G
CREAM VAGINAL
Qty: 42.5 G | Refills: 2 | Status: SHIPPED | OUTPATIENT
Start: 2024-10-03

## 2024-10-03 NOTE — PROGRESS NOTES
Subjective   Patient ID: Kathy Jimenez is a 74 y.o. female who presents for pessary maintenance.       HPI  73-year-old with stage II uterovaginal prolapse, urinary urgency and frequency, vaginal atrophy, mild pelvic floor pain and weakness having been fitted with a 2.25 inch Gellhorn short stem placed 9/22/2023       The patient appears to be having excellent results with her Gellhorn pessary. She denies any prolapse complaints . She denies any vaginal complaints, no abnormal bleeding or discharge. She is utilizing the vaginal estrogen cream.     She continues to note urinary urgency during the daytime every 1-2 hours and needs to double void. She has noted improvements in her nocturia complaints. She now is waking up once nightly and denies any enuresis. She denies any UTI like symptoms.    She does continue to note some constipation alternating with diarrhea, she does utilize prune juice and metamucil. She is taking antidiarrheal medicine.     She otherwise denies any other complaints    From Previous note  73-year-old with stage II uterovaginal prolapse, urinary urgency and frequency, vaginal atrophy, mild pelvic floor pain and weakness having been fitted with a 2.25 inch Gellhorn short stem placed 9/22/2023    The patient appears to be having excellent results with her Gellhorn pessary, however notes that when she has constipation and trying to push out it feels that her pessary is coming out. .She denies any prolapse complaints . She denies any vaginal complaints, no abnormal bleeding or discharge.     She continues to note urinary urgency during the daytime every 1-2 hours and need to double void.   She has noted improvements in her nocturia complaints. She now is waking up once nightly and denies any enuresis. She denies any UTI like symptoms.    She does continue to note some constipation alternating with diarrhea, she does utilize prune juice and metamucil.    She otherwise denies any other  complaints    .From Previous note  73-year-old with stage II uterovaginal prolapse, urinary urgency and frequency, vaginal atrophy, mild pelvic floor pain and weakness having been fitted with a 2.25 inch Gellhorn short stem 9/22/2023    The patient appears to be having excellent results with her Gellhorn pessary, however notes that when she has constipation and trying to push out it feels that her pessary is coming out. .She denies any prolapse complaints . She denies any vaginal complaints, no abnormal bleeding or discharge.     She has noted improvements in her nocturia complaints. She now is waking up once nightly and denies any enuresis. She continues to note urinary urgency during the daytime every 1-2 hours.      She does continue to note some constipation alternating with diarrhea, she does utilize prune juice and metamucil.    She otherwise denies any other complaints  .  From Previous note  73-year-old with stage II uterovaginal prolapse, urinary urgency and frequency, vaginal atrophy, mild pelvic floor pain and weakness having been fitted with a 2.25 inch Gellhorn short stem 9/22/2023.     The patient appears to be having excellent results with her Gellhorn pessary. She denies any prolapse complaints and has noted improvements in her nocturia complaints. She now is waking up once nightly and denies any enuresis. She continues to note urinary urgency during the daytime every 1-2 hours. She otherwise denies any other complaints.     She does continue to note some constipation.     From previous note  73-year-old with stage II uterovaginal prolapse, urinary urgency and frequency, vaginal atrophy, mild pelvic floor pain and weakness presenting for pessary fitting.     She has no other complaints.      From previous note  73 - year-old patient presenting as a referral from Dr. D'Amico with complaints of vaginal bulge, urinary urgency and frequency      The patient presents with complaints of urinary urgency and  frequency. She notes 1-4 episodes of nocturia but denies any enuresis. She voids every 30 minutes during the day with a sense of urgency. She does have a sensation of incomplete emptying and has to strain. She denies leaking on laughing, cough or sneezing. She denies any history of nephrolithiasis, gross hematuria or chronic recurrent UTIs. She is also a daily smoker.      She also notes a vaginal lump/bulge since July 2023, she notes this when she wipes. She states this lump/bulge comes and goes. Sis not sexually active and denies any abnormal vaginal bleeding or discharge. She denies any vaginal dryness or irritation.      She does note loose stool alternating with constipation but has regular bowel movement. She denies any fecal or flatal incontinence. She has a history of colon polyps and gets surveillance colonoscopies every 3 years.      She has no other complaints.    Review of Systems  Constitutional: No fever, No chills and No fatigue.   Eyes: No vision problems and No dryness of the eyes.   ENT: No dry mouth, No hearing loss and No nosebleeds.   Cardiovascular: No chest pain, No palpitations and No orthopnea.   Respiratory: No shortness of breath, No cough and No wheezing.   Gastrointestinal: No abdominal pain, No constipation, No nausea, No diarrhea, No vomiting and No melena.   Genitourinary: As noted in HPI.   Musculoskeletal: No back pain, No myalgias, No muscle weakness, No joint swelling and No leg edema.   Integumentary: No rashes, No skin lesion and No itching.   Neurological: No headache, No numbness and No dizziness.   Psychiatric: No sleep disturbances, No anxiety and No depression.   Endocrine: No hot flashes, No loss of hair and No hirsutism.   Hematologic/Lymphatic: No swollen glands, No tendency for easy bleeding and No tendency for easy bruising.   All other systems have been reviewed and are negative for complaint.        Objective   Physical Exam    The patient's 2.25 inch Gellhorn short  stem was grasped with ring forceps after the area was treated with lidocaine gel and removed without difficulty.  There was no evidence of any excoriations or other abnormalities.  The pessary was cleaned and replaced without difficulty and replaced.    Assessment/Plan   74-year-old with stage II uterovaginal prolapse, urinary urgency and frequency, vaginal atrophy, mild pelvic floor pain and weakness having been fitted with a 2.25 inch Gellhorn short stem originally placed 9/22/2023     1. The patient appears to be very satisfied with her 2.25 inch Gellhorn short stem and will continue this moving forward. Her atrophy and genital hiatus was not amenable to a ring pessary. We have previously discussed expectant management, pelvic floor physical therapy, pessary management, and briefly discussed surgery. She wishes to exhaust all medical therapies before proceeding with any definitive surgical options. She will follow-up in 4 to 5 months for pessary follow-up.     2. We discussed her lower urinary tract symptoms. She has had improvements in her nocturia complaints with her pessary in place. We did discuss the possibility for medication therapy for her daytime urgency and we will readdress this at follow-up appointments.  She is not interested in any therapy at this time.     3. We discussed continuing her vaginal estrogen therapy 3 times a week moving forward.  A new prescription was called into her pharmacy.     4. She is not interested in pelvic floor physical therapy at this time. We did discuss starting a daily fiber therapy as well as titrating a laxative to a soft bowel movement every day to 2 days.     5. The patient will follow-up in in 4 to 5 months for pessary follow-up in January/February 2025     YULIANA Levi MD       Scribe Attestation  By signing my name below, IYenny Scrfederico attest that this documentation has been prepared under the direction and in the presence of Bryn Levi,  MD. All medical record entries made by the Scribe were at my direction or personally dictated by me. I have reviewed the chart and agree that the record accurately reflects my personal performance of the history, physical exam, discussion and plan.

## 2024-10-03 NOTE — PATIENT INSTRUCTIONS
Please continue her vaginal estrogen therapy 3 times a week.    Please follow-up in January/February 2025 for pessary maintenance.    Please use MiraLAX daily to help with your constipation. Please titrate this to a soft bowel movement every day. Please use a fiber supplement that can include Metamucil, Citrucel, FiberCon, fiber biscuits, or fiber Gummies as well.       Please contact the clinic with any questions or concerns.    [phone]

## 2024-10-04 ENCOUNTER — OFFICE VISIT (OUTPATIENT)
Dept: UROLOGY | Facility: CLINIC | Age: 74
End: 2024-10-04
Payer: MEDICARE

## 2024-10-04 DIAGNOSIS — K59.01 SLOW TRANSIT CONSTIPATION: ICD-10-CM

## 2024-10-04 DIAGNOSIS — Z46.89 FITTING AND ADJUSTMENT OF PESSARY: Primary | ICD-10-CM

## 2024-10-04 DIAGNOSIS — Z96.0 PRESENCE OF PESSARY: ICD-10-CM

## 2024-10-04 DIAGNOSIS — N95.2 VAGINAL ATROPHY: ICD-10-CM

## 2024-10-04 DIAGNOSIS — R35.0 URINARY FREQUENCY: ICD-10-CM

## 2024-10-04 DIAGNOSIS — N81.89 PELVIC FLOOR WEAKNESS: ICD-10-CM

## 2024-10-04 DIAGNOSIS — N81.4 PROLAPSE, UTEROVAGINAL: ICD-10-CM

## 2024-10-04 PROCEDURE — 57160 INSERT PESSARY/OTHER DEVICE: CPT | Performed by: OBSTETRICS & GYNECOLOGY

## 2024-10-04 PROCEDURE — 99213 OFFICE O/P EST LOW 20 MIN: CPT | Performed by: OBSTETRICS & GYNECOLOGY

## 2024-10-04 PROCEDURE — 3044F HG A1C LEVEL LT 7.0%: CPT | Performed by: OBSTETRICS & GYNECOLOGY

## 2024-10-04 PROCEDURE — 3048F LDL-C <100 MG/DL: CPT | Performed by: OBSTETRICS & GYNECOLOGY

## 2024-10-04 PROCEDURE — 1159F MED LIST DOCD IN RCRD: CPT | Performed by: OBSTETRICS & GYNECOLOGY

## 2024-10-04 PROCEDURE — 1160F RVW MEDS BY RX/DR IN RCRD: CPT | Performed by: OBSTETRICS & GYNECOLOGY

## 2024-10-04 NOTE — PATIENT INSTRUCTIONS
Please continue her vaginal estrogen therapy 3 times a week.    Please follow-up in next week to discuss you pessary    Please follow up in January/February 2025 for pessary maintenance.    Please use MiraLAX daily to help with your constipation. Please titrate this to a soft bowel movement every day. Please use a fiber supplement that can include Metamucil, Citrucel, FiberCon, fiber biscuits, or fiber Gummies as well.     Please contact the clinic with any questions or concerns.    [phone]

## 2024-10-04 NOTE — PROGRESS NOTES
Subjective   Patient ID: Kathy Jimenez is a 74 y.o. female who presents for pessary.      HPI  74-year-old with stage II uterovaginal prolapse, urinary urgency and frequency, vaginal atrophy, mild pelvic floor pain and weakness having been fitted with a 2.25 inch Gellhorn short stem originally placed 9/22/2023    The patients pessary expulsed while having a bowel movements.She denies any vaginal complaints, no abnormal bleeding or discharge.        She continues to note urinary urgency during the daytime every 1-2 hours and needs to double void. She has noted improvements in her nocturia complaints. She now is waking up once nightly and denies any enuresis. She denies any UTI like symptoms.    She does continue to note some constipation alternating with diarrhea, she does utilize prune juice and metamucil. She is taking antidiarrheal medicine.     She otherwise denies any other complaints    From Previous note  74-year-old with stage II uterovaginal prolapse, urinary urgency and frequency, vaginal atrophy, mild pelvic floor pain and weakness having been fitted with a 2.25 inch Gellhorn short stem originally placed 9/22/2023     The patient appears to be having excellent results with her Gellhorn pessary. She denies any prolapse complaints . She denies any vaginal complaints, no abnormal bleeding or discharge. She is utilizing the vaginal estrogen cream.     She continues to note urinary urgency during the daytime every 1-2 hours and needs to double void. She has noted improvements in her nocturia complaints. She now is waking up once nightly and denies any enuresis. She denies any UTI like symptoms.    She does continue to note some constipation alternating with diarrhea, she does utilize prune juice and metamucil. She is taking antidiarrheal medicine.     She otherwise denies any other complaints    From Previous note  73-year-old with stage II uterovaginal prolapse, urinary urgency and frequency, vaginal  atrophy, mild pelvic floor pain and weakness having been fitted with a 2.25 inch Gellhorn short stem placed 9/22/2023       The patient appears to be having excellent results with her Gellhorn pessary. She denies any prolapse complaints . She denies any vaginal complaints, no abnormal bleeding or discharge. She is utilizing the vaginal estrogen cream.     She continues to note urinary urgency during the daytime every 1-2 hours and needs to double void. She has noted improvements in her nocturia complaints. She now is waking up once nightly and denies any enuresis. She denies any UTI like symptoms.    She does continue to note some constipation alternating with diarrhea, she does utilize prune juice and metamucil. She is taking antidiarrheal medicine.     She otherwise denies any other complaints    From Previous note  73-year-old with stage II uterovaginal prolapse, urinary urgency and frequency, vaginal atrophy, mild pelvic floor pain and weakness having been fitted with a 2.25 inch Gellhorn short stem placed 9/22/2023    The patient appears to be having excellent results with her Gellhorn pessary, however notes that when she has constipation and trying to push out it feels that her pessary is coming out. .She denies any prolapse complaints . She denies any vaginal complaints, no abnormal bleeding or discharge.     She continues to note urinary urgency during the daytime every 1-2 hours and need to double void.   She has noted improvements in her nocturia complaints. She now is waking up once nightly and denies any enuresis. She denies any UTI like symptoms.    She does continue to note some constipation alternating with diarrhea, she does utilize prune juice and metamucil.    She otherwise denies any other complaints    .From Previous note  73-year-old with stage II uterovaginal prolapse, urinary urgency and frequency, vaginal atrophy, mild pelvic floor pain and weakness having been fitted with a 2.25 inch  Gellhorn short stem 9/22/2023    The patient appears to be having excellent results with her Gellhorn pessary, however notes that when she has constipation and trying to push out it feels that her pessary is coming out. .She denies any prolapse complaints . She denies any vaginal complaints, no abnormal bleeding or discharge.     She has noted improvements in her nocturia complaints. She now is waking up once nightly and denies any enuresis. She continues to note urinary urgency during the daytime every 1-2 hours.      She does continue to note some constipation alternating with diarrhea, she does utilize prune juice and metamucil.    She otherwise denies any other complaints  .  From Previous note  73-year-old with stage II uterovaginal prolapse, urinary urgency and frequency, vaginal atrophy, mild pelvic floor pain and weakness having been fitted with a 2.25 inch Gellhorn short stem 9/22/2023.     The patient appears to be having excellent results with her Gellhorn pessary. She denies any prolapse complaints and has noted improvements in her nocturia complaints. She now is waking up once nightly and denies any enuresis. She continues to note urinary urgency during the daytime every 1-2 hours. She otherwise denies any other complaints.     She does continue to note some constipation.     From previous note  73-year-old with stage II uterovaginal prolapse, urinary urgency and frequency, vaginal atrophy, mild pelvic floor pain and weakness presenting for pessary fitting.     She has no other complaints.      From previous note  73 - year-old patient presenting as a referral from Dr. D'Amico with complaints of vaginal bulge, urinary urgency and frequency      The patient presents with complaints of urinary urgency and frequency. She notes 1-4 episodes of nocturia but denies any enuresis. She voids every 30 minutes during the day with a sense of urgency. She does have a sensation of incomplete emptying and has to  strain. She denies leaking on laughing, cough or sneezing. She denies any history of nephrolithiasis, gross hematuria or chronic recurrent UTIs. She is also a daily smoker.      She also notes a vaginal lump/bulge since July 2023, she notes this when she wipes. She states this lump/bulge comes and goes. Sis not sexually active and denies any abnormal vaginal bleeding or discharge. She denies any vaginal dryness or irritation.      She does note loose stool alternating with constipation but has regular bowel movement. She denies any fecal or flatal incontinence. She has a history of colon polyps and gets surveillance colonoscopies every 3 years.      She has no other complaints.    Review of Systems  Constitutional: No fever, No chills and No fatigue.   Eyes: No vision problems and No dryness of the eyes.   ENT: No dry mouth, No hearing loss and No nosebleeds.   Cardiovascular: No chest pain, No palpitations and No orthopnea.   Respiratory: No shortness of breath, No cough and No wheezing.   Gastrointestinal: No abdominal pain, No constipation, No nausea, No diarrhea, No vomiting and No melena.   Genitourinary: As noted in HPI.   Musculoskeletal: No back pain, No myalgias, No muscle weakness, No joint swelling and No leg edema.   Integumentary: No rashes, No skin lesion and No itching.   Neurological: No headache, No numbness and No dizziness.   Psychiatric: No sleep disturbances, No anxiety and No depression.   Endocrine: No hot flashes, No loss of hair and No hirsutism.   Hematologic/Lymphatic: No swollen glands, No tendency for easy bleeding and No tendency for easy bruising.   All other systems have been reviewed and are negative for complaint.        Objective   Physical Exam    The patient was fit with a 2.5 inch Gellhorn short stem without difficulty.  Assessment/Plan   74-year-old with stage II uterovaginal prolapse, urinary urgency and frequency, vaginal atrophy, mild pelvic floor pain and weakness having  been fitted with a 2.25 inch Gellhorn short stem originally placed 9/22/2023 having had it expulsed 10/3/2024 with constipation having been refit with a 2.5 inch Gellhorn short stem 10/4/2024     1.  The patient was having excellent results with her 2.25 inch Gellhorn  but this was exposed after recent replacement due to worsening constipation complaints.  She was refit with a 2.5 inch Gellhorn short stem today 10/4/2024. Her atrophy and genital hiatus was not amenable to a ring pessary. We have previously discussed expectant management, pelvic floor physical therapy, pessary management, and briefly discussed surgery. She wishes to exhaust all medical therapies before proceeding with any definitive surgical options. She will follow-up in 4 to 5 months for pessary follow-up.     2. We discussed her lower urinary tract symptoms. She has had improvements in her nocturia complaints with her pessary in place. We did discuss the possibility for medication therapy for her daytime urgency and we will readdress this at follow-up appointments.  She is not interested in any therapy at this time.     3. We discussed continuing her vaginal estrogen therapy 3 times a week moving forward.  A new prescription was called into her pharmacy.     4. We did discuss again starting a daily fiber therapy as well as titrating a laxative to a soft bowel movement every day to 2 days.     5. The patient will follow-up in in 4 to 5 months for pessary follow-up in January/February 2025     YULIANA Levi MD       Scribe Attestation  By signing my name below, I, Prashanth Morales attest that this documentation has been prepared under the direction and in the presence of Bryn Levi MD. All medical record entries made by the Scribe were at my direction or personally dictated by me. I have reviewed the chart and agree that the record accurately reflects my personal performance of the history, physical exam, discussion and plan.

## 2024-10-09 PROCEDURE — RXMED WILLOW AMBULATORY MEDICATION CHARGE

## 2024-10-10 ENCOUNTER — APPOINTMENT (OUTPATIENT)
Dept: PRIMARY CARE | Facility: CLINIC | Age: 74
End: 2024-10-10
Payer: MEDICARE

## 2024-10-10 VITALS
OXYGEN SATURATION: 97 % | WEIGHT: 105 LBS | HEIGHT: 60 IN | SYSTOLIC BLOOD PRESSURE: 132 MMHG | BODY MASS INDEX: 20.62 KG/M2 | HEART RATE: 78 BPM | DIASTOLIC BLOOD PRESSURE: 60 MMHG

## 2024-10-10 DIAGNOSIS — Z00.00 WELLNESS EXAMINATION: ICD-10-CM

## 2024-10-10 DIAGNOSIS — Z00.00 MEDICARE ANNUAL WELLNESS VISIT, SUBSEQUENT: ICD-10-CM

## 2024-10-10 DIAGNOSIS — I10 HYPERTENSION, UNSPECIFIED TYPE: Primary | ICD-10-CM

## 2024-10-10 DIAGNOSIS — E03.9 HYPOTHYROIDISM, UNSPECIFIED TYPE: ICD-10-CM

## 2024-10-10 DIAGNOSIS — N39.41 URGE INCONTINENCE: ICD-10-CM

## 2024-10-10 DIAGNOSIS — E11.9 TYPE 2 DIABETES MELLITUS WITHOUT COMPLICATION, WITHOUT LONG-TERM CURRENT USE OF INSULIN (MULTI): ICD-10-CM

## 2024-10-10 DIAGNOSIS — E11.40 TYPE 2 DIABETES MELLITUS WITH DIABETIC NEUROPATHY, WITHOUT LONG-TERM CURRENT USE OF INSULIN: ICD-10-CM

## 2024-10-10 DIAGNOSIS — E78.5 HYPERLIPIDEMIA, UNSPECIFIED HYPERLIPIDEMIA TYPE: ICD-10-CM

## 2024-10-10 DIAGNOSIS — Z12.31 ENCOUNTER FOR SCREENING MAMMOGRAM FOR MALIGNANT NEOPLASM OF BREAST: ICD-10-CM

## 2024-10-10 DIAGNOSIS — I27.20 PULMONARY HYPERTENSION, UNSPECIFIED (MULTI): ICD-10-CM

## 2024-10-10 DIAGNOSIS — J43.9 PULMONARY EMPHYSEMA, UNSPECIFIED EMPHYSEMA TYPE (MULTI): ICD-10-CM

## 2024-10-10 DIAGNOSIS — E55.9 VITAMIN D DEFICIENCY: ICD-10-CM

## 2024-10-10 PROBLEM — J44.9 CHRONIC OBSTRUCTIVE PULMONARY DISEASE (MULTI): Status: ACTIVE | Noted: 2024-10-10

## 2024-10-10 PROBLEM — Z97.3 WEARS EYEGLASSES: Status: ACTIVE | Noted: 2024-10-10

## 2024-10-10 PROBLEM — H43.399 VITREOUS FLOATERS: Status: ACTIVE | Noted: 2024-10-10

## 2024-10-10 PROCEDURE — 1159F MED LIST DOCD IN RCRD: CPT | Performed by: STUDENT IN AN ORGANIZED HEALTH CARE EDUCATION/TRAINING PROGRAM

## 2024-10-10 PROCEDURE — 3048F LDL-C <100 MG/DL: CPT | Performed by: STUDENT IN AN ORGANIZED HEALTH CARE EDUCATION/TRAINING PROGRAM

## 2024-10-10 PROCEDURE — 1170F FXNL STATUS ASSESSED: CPT | Performed by: STUDENT IN AN ORGANIZED HEALTH CARE EDUCATION/TRAINING PROGRAM

## 2024-10-10 PROCEDURE — G0439 PPPS, SUBSEQ VISIT: HCPCS | Performed by: STUDENT IN AN ORGANIZED HEALTH CARE EDUCATION/TRAINING PROGRAM

## 2024-10-10 PROCEDURE — 3008F BODY MASS INDEX DOCD: CPT | Performed by: STUDENT IN AN ORGANIZED HEALTH CARE EDUCATION/TRAINING PROGRAM

## 2024-10-10 PROCEDURE — 3078F DIAST BP <80 MM HG: CPT | Performed by: STUDENT IN AN ORGANIZED HEALTH CARE EDUCATION/TRAINING PROGRAM

## 2024-10-10 PROCEDURE — 90662 IIV NO PRSV INCREASED AG IM: CPT | Performed by: STUDENT IN AN ORGANIZED HEALTH CARE EDUCATION/TRAINING PROGRAM

## 2024-10-10 PROCEDURE — G0008 ADMIN INFLUENZA VIRUS VAC: HCPCS | Performed by: STUDENT IN AN ORGANIZED HEALTH CARE EDUCATION/TRAINING PROGRAM

## 2024-10-10 PROCEDURE — 99214 OFFICE O/P EST MOD 30 MIN: CPT | Performed by: STUDENT IN AN ORGANIZED HEALTH CARE EDUCATION/TRAINING PROGRAM

## 2024-10-10 PROCEDURE — 1124F ACP DISCUSS-NO DSCNMKR DOCD: CPT | Performed by: STUDENT IN AN ORGANIZED HEALTH CARE EDUCATION/TRAINING PROGRAM

## 2024-10-10 PROCEDURE — 99397 PER PM REEVAL EST PAT 65+ YR: CPT | Performed by: STUDENT IN AN ORGANIZED HEALTH CARE EDUCATION/TRAINING PROGRAM

## 2024-10-10 PROCEDURE — 3075F SYST BP GE 130 - 139MM HG: CPT | Performed by: STUDENT IN AN ORGANIZED HEALTH CARE EDUCATION/TRAINING PROGRAM

## 2024-10-10 PROCEDURE — 3044F HG A1C LEVEL LT 7.0%: CPT | Performed by: STUDENT IN AN ORGANIZED HEALTH CARE EDUCATION/TRAINING PROGRAM

## 2024-10-10 PROCEDURE — 1160F RVW MEDS BY RX/DR IN RCRD: CPT | Performed by: STUDENT IN AN ORGANIZED HEALTH CARE EDUCATION/TRAINING PROGRAM

## 2024-10-10 ASSESSMENT — ACTIVITIES OF DAILY LIVING (ADL)
GROCERY_SHOPPING: INDEPENDENT
MANAGING_FINANCES: INDEPENDENT
DOING_HOUSEWORK: INDEPENDENT
BATHING: INDEPENDENT
DRESSING: INDEPENDENT
TAKING_MEDICATION: INDEPENDENT

## 2024-10-10 ASSESSMENT — PATIENT HEALTH QUESTIONNAIRE - PHQ9
2. FEELING DOWN, DEPRESSED OR HOPELESS: NOT AT ALL
SUM OF ALL RESPONSES TO PHQ9 QUESTIONS 1 AND 2: 0
1. LITTLE INTEREST OR PLEASURE IN DOING THINGS: NOT AT ALL

## 2024-10-10 ASSESSMENT — ENCOUNTER SYMPTOMS
DEPRESSION: 0
OCCASIONAL FEELINGS OF UNSTEADINESS: 0
LOSS OF SENSATION IN FEET: 0

## 2024-10-10 NOTE — PROGRESS NOTES
"Subjective   Reason for Visit: Kathy Jimenez is an 74 y.o. female here for a Medicare Wellness visit.          Reviewed all medications by prescribing practitioner or clinical pharmacist (such as prescriptions, OTCs, herbal therapies and supplements) and documented in the medical record.    HPI    Patient Care Team:  Christopher D'Amico, DO as PCP - General  Christopher D'Amico, DO as PCP - [employer] PCP  Buddy Stone MD as Primary Care Provider     Review of Systems    Objective   Vitals:  /82   Pulse 78   Ht 1.651 m (5' 5\")   Wt 47.6 kg (105 lb)   SpO2 97%   BMI 17.47 kg/m²       Physical Exam    Assessment & Plan  Hypertension, unspecified type    Orders:    CBC; Future    Lipid Panel; Future    Comprehensive Metabolic Panel; Future    TSH with reflex to Free T4 if abnormal; Future    Albumin-Creatinine Ratio, Urine Random; Future    Hemoglobin A1C; Future    Hypothyroidism, unspecified type    Orders:    CBC; Future    Lipid Panel; Future    Comprehensive Metabolic Panel; Future    TSH with reflex to Free T4 if abnormal; Future    Albumin-Creatinine Ratio, Urine Random; Future    Hemoglobin A1C; Future    Type 2 diabetes mellitus without complication, without long-term current use of insulin (Multi)    Orders:    CBC; Future    Lipid Panel; Future    Comprehensive Metabolic Panel; Future    TSH with reflex to Free T4 if abnormal; Future    Albumin-Creatinine Ratio, Urine Random; Future    Hemoglobin A1C; Future    Hyperlipidemia, unspecified hyperlipidemia type    Orders:    CBC; Future    Lipid Panel; Future    Comprehensive Metabolic Panel; Future    TSH with reflex to Free T4 if abnormal; Future    Albumin-Creatinine Ratio, Urine Random; Future    Hemoglobin A1C; Future    Urge incontinence    Orders:    CBC; Future    Lipid Panel; Future    Comprehensive Metabolic Panel; Future    TSH with reflex to Free T4 if abnormal; Future    Albumin-Creatinine Ratio, Urine Random; " Future    Hemoglobin A1C; Future    Vitamin D deficiency    Orders:    CBC; Future    Lipid Panel; Future    Comprehensive Metabolic Panel; Future    TSH with reflex to Free T4 if abnormal; Future    Vitamin D 25-Hydroxy,Total (for eval of Vitamin D levels); Future    Albumin-Creatinine Ratio, Urine Random; Future    Hemoglobin A1C; Future    Medicare annual wellness visit, subsequent    Orders:    CBC; Future    Lipid Panel; Future    Comprehensive Metabolic Panel; Future    TSH with reflex to Free T4 if abnormal; Future    Albumin-Creatinine Ratio, Urine Random; Future    Hemoglobin A1C; Future    Wellness examination    Orders:    CBC; Future    Lipid Panel; Future    Comprehensive Metabolic Panel; Future    TSH with reflex to Free T4 if abnormal; Future    Albumin-Creatinine Ratio, Urine Random; Future    Hemoglobin A1C; Future                  74-year-old female presenting for follow-up on multiple concerns, Medicare annual wellness exam/CPE.    HTN  Blood pressure mostly stable, tends to be around goal, sometimes higher.  Tolerates regimen well.     Hypothyroidism  Stable, tolerating regimen well.     DMII  Home blood sugar readings have improved.  Tolerating regimen well.     HLD  Stable     Urge incontinence, uterovaginal prolapse  Following with urogynecology, stable and doing well.    Emphysema/COPD  Seen on lung cancer screening, unmedicated, asymptomatic.    Pulmonary hypertension  Following with cardiology    SocHx:   - Smoking: Daily, 55-pack-year history    12 point ROS reviewed and negative other than as stated in HPI      General: Alert, oriented, pleasant, in no acute distress  HEENT:      Head: normocephalic, atraumatic;      eyes: EOMI, no scleral icterus;   Neck: soft, supple, non-tender, no masses appreciated  CV: Heart with regular rate and rhythm, normal S1/S2, no murmurs  Lungs: CTAB without wheezing, rhonchi or rales; good respiratory effort, no increased work of breathing  Abdomen: soft,  non-tender, non-distended, no masses appreciated  Extremities: Trace edema bilaterally  Neuro: Cranial nerves grossly intact; alert and oriented  Psych: Appropriate mood and affect    #HM  -CBC, CMP, Lipid panel, Vit D, TSH with reflex T4  -Vaccines:       Flu: IO      Shingrix: Recommended 2nd shot, advised to go to local pharmacy      Pneumococcal: Prevnar 20 UTD      Tdap: Recommended, advised to go to the pharmacy  -Tio w/ amee: Ordered  -Lung cancer screening with low-dose CT: 55 years current smoker, screening done 01/2024  -Colonoscopy: 2020, 5 year plan  -Osteoporosis screening: DEXA 01/2024    #HTN  - BP essentially at goal in office  - Continue carvedilol 12.5 mg twice daily, amlodipine-olmesartan 5-40 mg daily, clonidine 0.1 mg twice daily  -Renal artery ultrasound negative, 24-hour metanephrines, aldosterone/renal activity negative  -Continue with cardiology    #DMII  -Most recent A1c at 6.3, 09/2024  -Currently on Ozempic 0.5 mg weekly Jardiance 25 mg daily  -Continue to follow with [facility] pharmacist  -UTD ophthalmology  - Follows with podiatry  - Repeat A1c, albumin     #Hypothyroidism  -Continue levothyroxine 125 mcg daily  -Repeat lab     #HLD  -Continue atorvastatin 20 mg daily  -Repeat lipid panel    #Urge incontinence #uterovaginal prolapse  -Continue to follow with urogynecology    #Empysema/COPD  -Stable without treatment  Smoking cessation encouraged    #Pulmonary HTN  -Continue to follow with cardiology    Follow-up 4-6 months    Chris D'Amico, DO

## 2024-10-11 ENCOUNTER — PHARMACY VISIT (OUTPATIENT)
Dept: PHARMACY | Facility: CLINIC | Age: 74
End: 2024-10-11
Payer: COMMERCIAL

## 2024-10-18 ENCOUNTER — LAB (OUTPATIENT)
Dept: LAB | Facility: LAB | Age: 74
End: 2024-10-18
Payer: MEDICARE

## 2024-10-18 DIAGNOSIS — E78.5 HYPERLIPIDEMIA, UNSPECIFIED HYPERLIPIDEMIA TYPE: ICD-10-CM

## 2024-10-18 DIAGNOSIS — Z00.00 MEDICARE ANNUAL WELLNESS VISIT, SUBSEQUENT: ICD-10-CM

## 2024-10-18 DIAGNOSIS — E03.9 HYPOTHYROIDISM, UNSPECIFIED TYPE: ICD-10-CM

## 2024-10-18 DIAGNOSIS — E11.9 TYPE 2 DIABETES MELLITUS WITHOUT COMPLICATION, WITHOUT LONG-TERM CURRENT USE OF INSULIN (MULTI): ICD-10-CM

## 2024-10-18 DIAGNOSIS — I10 HYPERTENSION, UNSPECIFIED TYPE: ICD-10-CM

## 2024-10-18 DIAGNOSIS — N39.41 URGE INCONTINENCE: ICD-10-CM

## 2024-10-18 DIAGNOSIS — Z00.00 WELLNESS EXAMINATION: ICD-10-CM

## 2024-10-18 DIAGNOSIS — E55.9 VITAMIN D DEFICIENCY: ICD-10-CM

## 2024-10-18 LAB
25(OH)D3 SERPL-MCNC: 71 NG/ML (ref 30–100)
ALBUMIN SERPL BCP-MCNC: 4.4 G/DL (ref 3.4–5)
ALP SERPL-CCNC: 59 U/L (ref 33–136)
ALT SERPL W P-5'-P-CCNC: 16 U/L (ref 7–45)
ANION GAP SERPL CALC-SCNC: 13 MMOL/L (ref 10–20)
AST SERPL W P-5'-P-CCNC: 22 U/L (ref 9–39)
BILIRUB SERPL-MCNC: 0.7 MG/DL (ref 0–1.2)
BUN SERPL-MCNC: 17 MG/DL (ref 6–23)
CALCIUM SERPL-MCNC: 9.7 MG/DL (ref 8.6–10.6)
CHLORIDE SERPL-SCNC: 99 MMOL/L (ref 98–107)
CHOLEST SERPL-MCNC: 141 MG/DL (ref 0–199)
CHOLESTEROL/HDL RATIO: 2.7
CO2 SERPL-SCNC: 32 MMOL/L (ref 21–32)
CREAT SERPL-MCNC: 0.59 MG/DL (ref 0.5–1.05)
CREAT UR-MCNC: 48.9 MG/DL (ref 20–320)
EGFRCR SERPLBLD CKD-EPI 2021: >90 ML/MIN/1.73M*2
ERYTHROCYTE [DISTWIDTH] IN BLOOD BY AUTOMATED COUNT: 13.3 % (ref 11.5–14.5)
EST. AVERAGE GLUCOSE BLD GHB EST-MCNC: 137 MG/DL
GLUCOSE SERPL-MCNC: 124 MG/DL (ref 74–99)
HBA1C MFR BLD: 6.4 %
HCT VFR BLD AUTO: 48.3 % (ref 36–46)
HDLC SERPL-MCNC: 52.8 MG/DL
HGB BLD-MCNC: 15.4 G/DL (ref 12–16)
LDLC SERPL CALC-MCNC: 67 MG/DL
MCH RBC QN AUTO: 29.5 PG (ref 26–34)
MCHC RBC AUTO-ENTMCNC: 31.9 G/DL (ref 32–36)
MCV RBC AUTO: 93 FL (ref 80–100)
MICROALBUMIN UR-MCNC: 45.9 MG/L
MICROALBUMIN/CREAT UR: 93.9 UG/MG CREAT
NON HDL CHOLESTEROL: 88 MG/DL (ref 0–149)
NRBC BLD-RTO: 0 /100 WBCS (ref 0–0)
PLATELET # BLD AUTO: 300 X10*3/UL (ref 150–450)
POTASSIUM SERPL-SCNC: 4.8 MMOL/L (ref 3.5–5.3)
PROT SERPL-MCNC: 6.9 G/DL (ref 6.4–8.2)
RBC # BLD AUTO: 5.22 X10*6/UL (ref 4–5.2)
SODIUM SERPL-SCNC: 139 MMOL/L (ref 136–145)
T4 FREE SERPL-MCNC: 1.88 NG/DL (ref 0.78–1.48)
TRIGL SERPL-MCNC: 104 MG/DL (ref 0–149)
TSH SERPL-ACNC: 7.33 MIU/L (ref 0.44–3.98)
VLDL: 21 MG/DL (ref 0–40)
WBC # BLD AUTO: 13 X10*3/UL (ref 4.4–11.3)

## 2024-10-18 PROCEDURE — 80061 LIPID PANEL: CPT

## 2024-10-18 PROCEDURE — 36415 COLL VENOUS BLD VENIPUNCTURE: CPT

## 2024-10-18 PROCEDURE — 85027 COMPLETE CBC AUTOMATED: CPT

## 2024-10-18 PROCEDURE — 82306 VITAMIN D 25 HYDROXY: CPT

## 2024-10-18 PROCEDURE — 82570 ASSAY OF URINE CREATININE: CPT

## 2024-10-18 PROCEDURE — 80053 COMPREHEN METABOLIC PANEL: CPT

## 2024-10-18 PROCEDURE — 83036 HEMOGLOBIN GLYCOSYLATED A1C: CPT

## 2024-10-18 PROCEDURE — 84443 ASSAY THYROID STIM HORMONE: CPT

## 2024-10-18 PROCEDURE — 84439 ASSAY OF FREE THYROXINE: CPT

## 2024-10-18 PROCEDURE — 82043 UR ALBUMIN QUANTITATIVE: CPT

## 2024-10-18 NOTE — LETTER
October 23, 2024     Kathy [place]  [id number] [facility] [id number]      Dear Ms. Jimenez:    Below are the results from your recent visit:    TSH elevated at 7.33, elevated T4, which is a paradoxical finding, usually elevated T4 will have low TSH.  Have you been taking your medication at the same time every day?  We have seen this before and past labs about 2 years ago.  Should see endocrinology, I am going to put in a referral as I do not see an endocrinologist in your chart recently.  We will also repeat labs and include TSH T3 and T4.     Hemoglobin A1c at 6.4, very good, continue current regimen.     Protein present in urine.  Already on olmesartan and Jardiance.  Important to keep blood sugar and blood pressure under control.  Kidney function is within normal limits luckily.     Mild elevation in hematocrit with normal hemoglobin.  Continue to monitor.  Likely secondary to smoking.     Remaining labs unremarkable.       If you have any questions or concerns, please don't hesitate to call [phone] ([place])  Please call to schedule with Endocrinology [phone]

## 2024-10-20 DIAGNOSIS — E03.9 HYPOTHYROIDISM, UNSPECIFIED TYPE: Primary | ICD-10-CM

## 2024-10-20 NOTE — RESULT ENCOUNTER NOTE
TSH elevated at 7.33, elevated T4, which is a paradoxical finding, usually elevated T4 will have low TSH.  Has she been taking her medication at the same time every day?  We have seen this before and past labs about 2 years ago.  Patient should see endocrinology, I am going to put in a referral as I do not see an endocrinologist in her chart recently.  We will also repeat labs and include TSH T3 and T4.    Hemoglobin A1c at 6.4, very good, continue current regimen.    Protein present in urine.  Already on olmesartan and Jardiance.  Important to keep blood sugar and blood pressure under control.  Kidney function is within normal limits luckily.    Mild elevation in hematocrit with normal hemoglobin.  Continue to monitor.  Likely secondary to smoking.    Remaining labs unremarkable.

## 2024-10-23 ENCOUNTER — TELEPHONE (OUTPATIENT)
Dept: PRIMARY CARE | Facility: CLINIC | Age: 74
End: 2024-10-23
Payer: MEDICARE

## 2024-10-24 DIAGNOSIS — E11.9 TYPE 2 DIABETES MELLITUS WITHOUT COMPLICATION, WITHOUT LONG-TERM CURRENT USE OF INSULIN (MULTI): ICD-10-CM

## 2024-10-24 RX ORDER — BLOOD-GLUCOSE METER
EACH MISCELLANEOUS
Qty: 100 EACH | Refills: 3 | Status: SHIPPED | OUTPATIENT
Start: 2024-10-24

## 2024-11-04 PROCEDURE — RXMED WILLOW AMBULATORY MEDICATION CHARGE

## 2024-11-06 ENCOUNTER — PHARMACY VISIT (OUTPATIENT)
Dept: PHARMACY | Facility: CLINIC | Age: 74
End: 2024-11-06
Payer: COMMERCIAL

## 2024-11-08 PROCEDURE — RXMED WILLOW AMBULATORY MEDICATION CHARGE

## 2024-11-12 ENCOUNTER — PHARMACY VISIT (OUTPATIENT)
Dept: PHARMACY | Facility: CLINIC | Age: 74
End: 2024-11-12
Payer: COMMERCIAL

## 2024-11-12 ENCOUNTER — APPOINTMENT (OUTPATIENT)
Dept: OPHTHALMOLOGY | Facility: CLINIC | Age: 74
End: 2024-11-12
Payer: MEDICARE

## 2024-11-12 DIAGNOSIS — H35.031: Primary | ICD-10-CM

## 2024-11-12 DIAGNOSIS — E11.9 TYPE 2 DIABETES MELLITUS WITHOUT RETINOPATHY (MULTI): ICD-10-CM

## 2024-11-12 DIAGNOSIS — H04.123 DRY EYE SYNDROME OF BOTH LACRIMAL GLANDS: ICD-10-CM

## 2024-11-12 DIAGNOSIS — H43.813 POSTERIOR VITREOUS DETACHMENT OF BOTH EYES: ICD-10-CM

## 2024-11-12 PROBLEM — H43.399 VITREOUS FLOATERS: Status: RESOLVED | Noted: 2024-10-10 | Resolved: 2024-11-12

## 2024-11-12 PROBLEM — Z97.3 WEARS EYEGLASSES: Status: RESOLVED | Noted: 2024-10-10 | Resolved: 2024-11-12

## 2024-11-12 PROCEDURE — 92012 INTRM OPH EXAM EST PATIENT: CPT | Performed by: STUDENT IN AN ORGANIZED HEALTH CARE EDUCATION/TRAINING PROGRAM

## 2024-11-12 ASSESSMENT — CONF VISUAL FIELD
OS_NORMAL: 1
OD_SUPERIOR_TEMPORAL_RESTRICTION: 0
OD_INFERIOR_NASAL_RESTRICTION: 0
OD_SUPERIOR_NASAL_RESTRICTION: 0
OS_INFERIOR_NASAL_RESTRICTION: 0
OS_SUPERIOR_TEMPORAL_RESTRICTION: 0
OD_INFERIOR_TEMPORAL_RESTRICTION: 0
OS_INFERIOR_TEMPORAL_RESTRICTION: 0
METHOD: COUNTING FINGERS
OD_NORMAL: 1
OS_SUPERIOR_NASAL_RESTRICTION: 0

## 2024-11-12 ASSESSMENT — EXTERNAL EXAM - RIGHT EYE: OD_EXAM: NORMAL

## 2024-11-12 ASSESSMENT — ENCOUNTER SYMPTOMS
MUSCULOSKELETAL NEGATIVE: 0
NEUROLOGICAL NEGATIVE: 0
RESPIRATORY NEGATIVE: 0
CONSTITUTIONAL NEGATIVE: 0
HEMATOLOGIC/LYMPHATIC NEGATIVE: 0
ENDOCRINE NEGATIVE: 1
GASTROINTESTINAL NEGATIVE: 0
EYES NEGATIVE: 1
PSYCHIATRIC NEGATIVE: 0
ALLERGIC/IMMUNOLOGIC NEGATIVE: 0
CARDIOVASCULAR NEGATIVE: 0

## 2024-11-12 ASSESSMENT — CUP TO DISC RATIO
OD_RATIO: .1
OS_RATIO: .1

## 2024-11-12 ASSESSMENT — VISUAL ACUITY
OD_CC: 20/25
OS_CC: 20/25
OS_CC: 20/20
METHOD: SNELLEN - LINEAR
CORRECTION_TYPE: GLASSES
OD_CC: 20/20-

## 2024-11-12 ASSESSMENT — TONOMETRY
IOP_METHOD: GOLDMANN APPLANATION
OS_IOP_MMHG: 15
OD_IOP_MMHG: 15

## 2024-11-12 ASSESSMENT — EXTERNAL EXAM - LEFT EYE: OS_EXAM: NORMAL

## 2024-11-12 NOTE — PROGRESS NOTES
Assessment/Plan   Diagnoses and all orders for this visit:  Hypertensive retinopathy of right eye, grade 1  -hypertensive changes noted OD>OS; persistent splinter heme noted right eye  -advised continue blood pressure mgmt as directed with health care team  Type 2 diabetes mellitus without retinopathy (Multi)  -no retinopathy observed on exam today od/os, pt ed to continue good BGlc, blood pressure and lipid control, rtc with any changes in vision, otherwise monitor 1 year.  -isolated single dot hemorrhage left eye today  Dry eye syndrome of both lacrimal glands  -Continue with systane AT's BID  -patient has been having some tearing with extended computer use  -advised on warm compresses   Posterior vitreous detachment of both eyes  -Stable retinal exam  Bilateral myopia  Bilateral presbyopia  Presence of artificial intra-ocular lens  Stable RX today, continue with [facility] from 05/2024   BCVA OD+OS  20/20    RTC 1 year for annual with DFE and MRX

## 2024-11-17 DIAGNOSIS — E03.9 HYPOTHYROIDISM, UNSPECIFIED TYPE: ICD-10-CM

## 2024-11-18 RX ORDER — LEVOTHYROXINE SODIUM 125 UG/1
125 TABLET ORAL DAILY
Qty: 90 TABLET | Refills: 0 | Status: SHIPPED | OUTPATIENT
Start: 2024-11-18

## 2024-12-03 PROCEDURE — RXMED WILLOW AMBULATORY MEDICATION CHARGE

## 2024-12-06 ENCOUNTER — PHARMACY VISIT (OUTPATIENT)
Dept: PHARMACY | Facility: CLINIC | Age: 74
End: 2024-12-06
Payer: COMMERCIAL

## 2024-12-10 ENCOUNTER — APPOINTMENT (OUTPATIENT)
Dept: PHARMACY | Facility: HOSPITAL | Age: 74
End: 2024-12-10
Payer: MEDICARE

## 2024-12-10 ENCOUNTER — HOSPITAL ENCOUNTER (OUTPATIENT)
Dept: RADIOLOGY | Facility: CLINIC | Age: 74
Discharge: HOME | End: 2024-12-10
Payer: MEDICARE

## 2024-12-10 VITALS — BODY MASS INDEX: 19.83 KG/M2 | WEIGHT: 101 LBS | HEIGHT: 60 IN

## 2024-12-10 DIAGNOSIS — Z12.31 ENCOUNTER FOR SCREENING MAMMOGRAM FOR MALIGNANT NEOPLASM OF BREAST: ICD-10-CM

## 2024-12-10 DIAGNOSIS — E11.9 CONTROLLED TYPE 2 DIABETES MELLITUS WITHOUT COMPLICATION, WITHOUT LONG-TERM CURRENT USE OF INSULIN (MULTI): Primary | ICD-10-CM

## 2024-12-10 PROCEDURE — 77067 SCR MAMMO BI INCL CAD: CPT | Performed by: RADIOLOGY

## 2024-12-10 PROCEDURE — 77063 BREAST TOMOSYNTHESIS BI: CPT | Performed by: RADIOLOGY

## 2024-12-10 PROCEDURE — 77063 BREAST TOMOSYNTHESIS BI: CPT

## 2024-12-10 RX ORDER — SEMAGLUTIDE 0.68 MG/ML
0.25 INJECTION, SOLUTION SUBCUTANEOUS WEEKLY
Start: 2024-12-10

## 2024-12-10 RX ORDER — LANCETS 30 GAUGE
EACH MISCELLANEOUS
Qty: 1 EACH | Refills: 0 | Status: SHIPPED | OUTPATIENT
Start: 2024-12-10

## 2024-12-10 NOTE — PROGRESS NOTES
Pharmacist Clinic: Diabetes Management  Kathy Jimenez is a 74 y.o. female was referred to Clinical Pharmacy Team for diabetes management.     Referring Provider: Christopher D'Amico, DO     HISTORY OF PRESENT ILLNESS  Patient has been well controlled, at last visit patient A1c was 7.3 %  Patient has diabetes on both sides of her family.   Patient suffers from short term memory loss, today on the phone I spoke with her and her .   Patient does not limit herself with what she eats, she has 3 well balanced meals per day. Avoids greasy/fatty/fried foods.     LAB REVIEW   Glucose (mg/dL)   Date Value   10/18/2024 124 (H)   09/04/2024 121 (H)   04/10/2024 119 (H)     Hemoglobin A1C (%)   Date Value   10/18/2024 6.4 (H)   09/04/2024 6.3 (H)   04/10/2024 7.3 (H)     Bicarbonate (mmol/L)   Date Value   10/18/2024 32   09/04/2024 31   04/10/2024 33 (H)     Urea Nitrogen (mg/dL)   Date Value   10/18/2024 17   09/04/2024 13   04/10/2024 13     Creatinine (mg/dL)   Date Value   10/18/2024 0.59   09/04/2024 0.59   04/10/2024 0.61     Lab Results   Component Value Date    HGBA1C 6.4 (H) 10/18/2024    HGBA1C 6.3 (H) 09/04/2024    HGBA1C 7.3 (H) 04/10/2024     Lab Results   Component Value Date    CHOL 141 10/18/2024    CHOL 131 04/10/2024    CHOL 131 10/25/2023     Lab Results   Component Value Date    HDL 52.8 10/18/2024    HDL 50.0 04/10/2024    HDL 44.6 10/25/2023     Lab Results   Component Value Date    LDLCALC 67 10/18/2024    LDLCALC 56 04/10/2024    LDLCALC 58 10/25/2023     Lab Results   Component Value Date    TRIG 104 10/18/2024    TRIG 125 04/10/2024    TRIG 142 10/25/2023       DIABETES ASSESSMENT    CURRENT PHARMACOTHERAPY  - jardiance 25 mg once daily  - ozempic 0.5 mg once weekly (Fridays)    SECONDARY PREVENTION  - Statin? Yes  - ACE-I/ARB? Yes  - Aspirin? No    SMBG MEASUREMENTS:   - Since last speaking, her meter broke    HISTORICAL PHARMACOTHERAPY:   - metformin  mg take 2 tablets by mouth  every day    DISCUSSION:   Discussed A1c came back at 6.4%, this is well below goal of 7%, congrats!   Patient is tolerating current medication regimen   She reports she does not have much of an appetite and is wondering if there is an alternative medication to the ozempic that will keep her A1c down   Discussed decreasing to 0.25 mg once weekly and at follow up we will reassess appetite and weight   Consider switching to trulicity     RECOMMENDATIONS/PLAN  1. Patients diabetes is well controlled with most recent A1c of 6.3 % (goal < 7 %).   - Continue all meds under the continuation of care with the referring provider and clinical pharmacy team.  - Decrease ozempic to 0.25 mg once weekly    Clinical Pharmacist follow up: 1 month   PAP Approval: 4/17/2024    Thank you,   Cammie Trinh, PharmD     Verbal consent to manage patient's drug therapy was obtained from the patient. They were informed they may decline to participate or withdraw from participation in pharmacy services at any time.

## 2025-01-10 ENCOUNTER — APPOINTMENT (OUTPATIENT)
Dept: PHARMACY | Facility: HOSPITAL | Age: 75
End: 2025-01-10
Payer: MEDICARE

## 2025-01-10 DIAGNOSIS — E11.9 CONTROLLED TYPE 2 DIABETES MELLITUS WITHOUT COMPLICATION, WITHOUT LONG-TERM CURRENT USE OF INSULIN (MULTI): Primary | ICD-10-CM

## 2025-01-10 NOTE — PROGRESS NOTES
Pharmacist Clinic: Diabetes Management  Kathy Jimenez is a 74 y.o. female was referred to Clinical Pharmacy Team for diabetes management.     Referring Provider: Christopher D'Amico, DO     HISTORY OF PRESENT ILLNESS  Patient has been well controlled, at last visit patient A1c was 7.3 %  Patient has diabetes on both sides of her family.   Patient suffers from short term memory loss, today on the phone I spoke with her and her .   Patient does not limit herself with what she eats, she has 3 well balanced meals per day. Avoids greasy/fatty/fried foods.     LAB REVIEW   Glucose (mg/dL)   Date Value   10/18/2024 124 (H)   09/04/2024 121 (H)   04/10/2024 119 (H)     Hemoglobin A1C (%)   Date Value   10/18/2024 6.4 (H)   09/04/2024 6.3 (H)   04/10/2024 7.3 (H)     Bicarbonate (mmol/L)   Date Value   10/18/2024 32   09/04/2024 31   04/10/2024 33 (H)     Urea Nitrogen (mg/dL)   Date Value   10/18/2024 17   09/04/2024 13   04/10/2024 13     Creatinine (mg/dL)   Date Value   10/18/2024 0.59   09/04/2024 0.59   04/10/2024 0.61     Lab Results   Component Value Date    HGBA1C 6.4 (H) 10/18/2024    HGBA1C 6.3 (H) 09/04/2024    HGBA1C 7.3 (H) 04/10/2024     Lab Results   Component Value Date    CHOL 141 10/18/2024    CHOL 131 04/10/2024    CHOL 131 10/25/2023     Lab Results   Component Value Date    HDL 52.8 10/18/2024    HDL 50.0 04/10/2024    HDL 44.6 10/25/2023     Lab Results   Component Value Date    LDLCALC 67 10/18/2024    LDLCALC 56 04/10/2024    LDLCALC 58 10/25/2023     Lab Results   Component Value Date    TRIG 104 10/18/2024    TRIG 125 04/10/2024    TRIG 142 10/25/2023       DIABETES ASSESSMENT    CURRENT PHARMACOTHERAPY  - jardiance 25 mg once daily  - ozempic 0.25 mg once weekly (Fridays); higher doses caused weight loss    SECONDARY PREVENTION  - Statin? Yes  - ACE-I/ARB? Yes  - Aspirin? No    SMBG MEASUREMENTS:   - Since last speaking, her meter broke    HISTORICAL PHARMACOTHERAPY:   - metformin XR  500 mg take 2 tablets by mouth every day    DISCUSSION:   Discussed A1c came back at 6.4%, this is well below goal of 7%, congrats!   Patient is tolerating current medication regimen   She reports she has decreased ozempic to 0.25 mg once weekly and her appetite has increased  Her BG readings have been fluctuating, discussed continuing to write down what she eats to help guide healthier choices     RECOMMENDATIONS/PLAN  1. Patients diabetes is well controlled with most recent A1c of 6.4% (goal < 7 %).   - Continue all meds under the continuation of care with the referring provider and clinical pharmacy team.    Clinical Pharmacist follow up: 1 month   PAP Approval: 4/17/2024    Thank you,   Cammie Trinh, PharmD     Verbal consent to manage patient's drug therapy was obtained from the patient. They were informed they may decline to participate or withdraw from participation in pharmacy services at any time.

## 2025-01-18 DIAGNOSIS — I10 ESSENTIAL HYPERTENSION: ICD-10-CM

## 2025-01-21 RX ORDER — AMLODIPINE AND OLMESARTAN MEDOXOMIL 5; 40 MG/1; MG/1
1 TABLET ORAL DAILY
Qty: 90 TABLET | Refills: 0 | Status: SHIPPED | OUTPATIENT
Start: 2025-01-21

## 2025-01-24 ENCOUNTER — HOSPITAL ENCOUNTER (OUTPATIENT)
Dept: RADIOLOGY | Facility: HOSPITAL | Age: 75
Discharge: HOME | End: 2025-01-24
Payer: MEDICARE

## 2025-01-24 DIAGNOSIS — F17.210 NICOTINE DEPENDENCE, CIGARETTES, UNCOMPLICATED: ICD-10-CM

## 2025-01-24 PROCEDURE — 71271 CT THORAX LUNG CANCER SCR C-: CPT

## 2025-01-30 NOTE — PROGRESS NOTES
"History Of Present Illness  Kathy Jimenez is a 74 y.o. female  with hx of stage II uterovaginal prolapse, urinary urgency and frequency, vaginal atrophy, mild pelvic floor pain and weakness having been fitted with a 2.25 inch Gellhorn short stem originally placed 9/22/2023, had it expulsed 10/3/2024 during constipation episode, refit with a 2.5 inch Gellhorn short stem 10/4/2024. Today she reports she dislikes the pessary and wants it removed without replacement because she believes it is causing her bladder symptoms.        No expulsion. She denies any vaginal complaints, no abnormal bleeding or discharge. Utilizing vaginal estrogen 3x weekly  Urinary issues: +Urinary frequency and has to double-urinate, rare urinary incontinence. Reports urinating 4-6x per day, 2x at night. Prior visits noted improvements in nocturia due to pessary with plan for reevaluating daytime urgency and she was previously not interested in medications for this. Today she does not believe pessary is helping and also not currently interested in bladder medication  Constipation: \"bad\". Taking daily fiber, laxative almost daily.     Previously discussed options of expectant management, pelvic floor physical therapy, pessary management, and surgery had been discussed with patient. She wishes to exhaust all medical therapies before proceeding with any definitive surgical options.     PVR normal, 33      Past Medical History  She has a past medical history of Cataract, COPD (chronic obstructive pulmonary disease) (Multi), Displaced bimalleolar fracture of unspecified lower leg, initial encounter for closed fracture (09/29/2014), Diverticulitis, Hypothyroid, Neuropathy, Osteoporosis, Pulmonary hypertension (Multi), Retinopathy, and Type 2 diabetes mellitus.    Surgical History  She has a past surgical history that includes Colonoscopy (10/31/2013); Colonoscopy w/ polypectomy (06/19/2014); and Cataract extraction (02/13/2019).     Social " History  She reports that she has been smoking cigarettes. She has never used smokeless tobacco. No history on file for alcohol use and drug use.    Family History  Family History   Problem Relation Name Age of Onset    Lung cancer Father      Diabetes Sister      Diabetes Brother          Allergies  Sulfa (sulfonamide antibiotics)    Review of Systems   Constitutional:  Negative for fever.   All other systems reviewed and are negative.       Physical Exam  Con: awake, alert, NAD  HEENT: normocephalic, speech normal  CV: no peripheral edema  Resp: no increased work of breathing  Neuro: normal mentation  Psych: mood normal  Skin: no rash  : vulva normal, vagina and cervix normal without any bleeding or evidence of erosions     Pessary removed and provided back to patient     Last Recorded Vitals  Temperature 36.2 °C (97.1 °F), height 1.524 m (5'), weight 47.4 kg (104 lb 9.6 oz).       Assessment/Plan     Pessary removed, cleaned, and handed back to patient in case she desires to try it again in the future. For now will plan to go 3 months without pessary to evaluate if this improves her bladder symptoms. Also discussed hydration to dilute urine. Patient to also ask her prescriber for her diabetes medications whether alterative to Jardiance could be tried, however I did stress to her that good diabetic control ultimately has high priority to prevent other systemic problems.     Mag citrate prescribed to aid with constipation    Follow up in 3 months    Ursula Perez MD, Gynecologist  Female Reconstruction & Sexual Medicine Fellow  Dept of Urology/OBGYN  1/31/2025

## 2025-01-31 ENCOUNTER — APPOINTMENT (OUTPATIENT)
Dept: UROLOGY | Facility: CLINIC | Age: 75
End: 2025-01-31
Payer: MEDICARE

## 2025-01-31 VITALS — TEMPERATURE: 97.1 F | HEIGHT: 60 IN | WEIGHT: 104.6 LBS | BODY MASS INDEX: 20.54 KG/M2

## 2025-01-31 DIAGNOSIS — I25.10 CORONARY ARTERY DISEASE INVOLVING NATIVE CORONARY ARTERY OF NATIVE HEART WITHOUT ANGINA PECTORIS: Primary | ICD-10-CM

## 2025-01-31 DIAGNOSIS — R35.0 URINARY FREQUENCY: ICD-10-CM

## 2025-01-31 DIAGNOSIS — Z96.0 PRESENCE OF PESSARY: Primary | ICD-10-CM

## 2025-01-31 DIAGNOSIS — K59.00 CONSTIPATION, UNSPECIFIED CONSTIPATION TYPE: ICD-10-CM

## 2025-01-31 PROCEDURE — 3008F BODY MASS INDEX DOCD: CPT | Performed by: STUDENT IN AN ORGANIZED HEALTH CARE EDUCATION/TRAINING PROGRAM

## 2025-01-31 PROCEDURE — 1159F MED LIST DOCD IN RCRD: CPT | Performed by: STUDENT IN AN ORGANIZED HEALTH CARE EDUCATION/TRAINING PROGRAM

## 2025-01-31 PROCEDURE — 99214 OFFICE O/P EST MOD 30 MIN: CPT | Performed by: STUDENT IN AN ORGANIZED HEALTH CARE EDUCATION/TRAINING PROGRAM

## 2025-01-31 RX ORDER — SYRING-NEEDL,DISP,INSUL,0.3 ML 29 G X1/2"
195 SYRINGE, EMPTY DISPOSABLE MISCELLANEOUS DAILY PRN
Qty: 2000 ML | Refills: 2 | Status: SHIPPED | OUTPATIENT
Start: 2025-01-31

## 2025-01-31 ASSESSMENT — ENCOUNTER SYMPTOMS: FEVER: 0

## 2025-02-02 PROCEDURE — RXMED WILLOW AMBULATORY MEDICATION CHARGE

## 2025-02-06 ENCOUNTER — APPOINTMENT (OUTPATIENT)
Dept: UROLOGY | Facility: CLINIC | Age: 75
End: 2025-02-06
Payer: MEDICARE

## 2025-02-06 ENCOUNTER — PHARMACY VISIT (OUTPATIENT)
Dept: PHARMACY | Facility: CLINIC | Age: 75
End: 2025-02-06
Payer: COMMERCIAL

## 2025-02-10 ENCOUNTER — APPOINTMENT (OUTPATIENT)
Dept: PHARMACY | Facility: HOSPITAL | Age: 75
End: 2025-02-10
Payer: MEDICARE

## 2025-02-10 DIAGNOSIS — E11.9 CONTROLLED TYPE 2 DIABETES MELLITUS WITHOUT COMPLICATION, WITHOUT LONG-TERM CURRENT USE OF INSULIN (MULTI): Primary | ICD-10-CM

## 2025-02-10 DIAGNOSIS — E11.9 TYPE 2 DIABETES MELLITUS WITHOUT COMPLICATION, WITHOUT LONG-TERM CURRENT USE OF INSULIN (MULTI): ICD-10-CM

## 2025-02-10 NOTE — PROGRESS NOTES
Pharmacist Clinic: Diabetes Management  Kathy Jimenez is a 74 y.o. female was referred to Clinical Pharmacy Team for diabetes management.     Referring Provider: Christopher D'Amico, DO     HISTORY OF PRESENT ILLNESS  Patient has been well controlled, at last visit patient A1c was 7.3 %  Patient has diabetes on both sides of her family.   Patient suffers from short term memory loss, today on the phone I spoke with her and her .   Patient does not limit herself with what she eats, she has 3 well balanced meals per day. Avoids greasy/fatty/fried foods.     LAB REVIEW   Glucose (mg/dL)   Date Value   10/18/2024 124 (H)   09/04/2024 121 (H)   04/10/2024 119 (H)     Hemoglobin A1C (%)   Date Value   10/18/2024 6.4 (H)   09/04/2024 6.3 (H)   04/10/2024 7.3 (H)     Bicarbonate (mmol/L)   Date Value   10/18/2024 32   09/04/2024 31   04/10/2024 33 (H)     Urea Nitrogen (mg/dL)   Date Value   10/18/2024 17   09/04/2024 13   04/10/2024 13     Creatinine (mg/dL)   Date Value   10/18/2024 0.59   09/04/2024 0.59   04/10/2024 0.61     Lab Results   Component Value Date    HGBA1C 6.4 (H) 10/18/2024    HGBA1C 6.3 (H) 09/04/2024    HGBA1C 7.3 (H) 04/10/2024     Lab Results   Component Value Date    CHOL 141 10/18/2024    CHOL 131 04/10/2024    CHOL 131 10/25/2023     Lab Results   Component Value Date    HDL 52.8 10/18/2024    HDL 50.0 04/10/2024    HDL 44.6 10/25/2023     Lab Results   Component Value Date    LDLCALC 67 10/18/2024    LDLCALC 56 04/10/2024    LDLCALC 58 10/25/2023     Lab Results   Component Value Date    TRIG 104 10/18/2024    TRIG 125 04/10/2024    TRIG 142 10/25/2023       DIABETES ASSESSMENT    CURRENT PHARMACOTHERAPY  - jardiance 25 mg once daily  - ozempic 0.25 mg once weekly (Fridays); higher doses caused weight loss    SECONDARY PREVENTION  - Statin? Yes  - ACE-I/ARB? Yes  - Aspirin? No    SMBG MEASUREMENTS:   - Since last speaking, her meter broke    HISTORICAL PHARMACOTHERAPY:   - metformin XR  500 mg take 2 tablets by mouth every day    DISCUSSION:   Discussed A1c came back at 6.4%, this is well below goal of 7%, congrats!   Patient is tolerating current medication regimen   Ozempic:   She is tolerating the medication without issues  She reports mild constipation, counseled on OTC laxatives (miralax/metamucil daily, colace as needed)   Jardiance:   She is tolerating the medication without issues, she reports she had issues urinating however it has since resolved since her pessary was removed  Discussed her urologist advised we evaluate potential for jardiance to be causing these side effects, however given well control of DM and resolution of symptoms post pessary removal, we do not think the jardiance is contributing     RECOMMENDATIONS/PLAN  1. Patients diabetes is well controlled with most recent A1c of 6.4% (goal < 7 %).   - Continue all meds under the continuation of care with the referring provider and clinical pharmacy team.    Clinical Pharmacist follow up: 2 months   PAP Approval: 4/17/2024    Thank you,   Cammie Trinh, PharmD     Verbal consent to manage patient's drug therapy was obtained from the patient. They were informed they may decline to participate or withdraw from participation in pharmacy services at any time.

## 2025-02-17 ENCOUNTER — TELEPHONE (OUTPATIENT)
Dept: PRIMARY CARE | Facility: CLINIC | Age: 75
End: 2025-02-17
Payer: MEDICARE

## 2025-02-21 LAB
CREAT SERPL-MCNC: 0.55 MG/DL (ref 0.6–1)
EGFRCR SERPLBLD CKD-EPI 2021: 97 ML/MIN/1.73M2

## 2025-02-24 ENCOUNTER — HOSPITAL ENCOUNTER (OUTPATIENT)
Dept: RADIOLOGY | Facility: HOSPITAL | Age: 75
Discharge: HOME | End: 2025-02-24
Payer: MEDICARE

## 2025-02-24 DIAGNOSIS — I25.10 CORONARY ARTERY DISEASE INVOLVING NATIVE CORONARY ARTERY OF NATIVE HEART WITHOUT ANGINA PECTORIS: ICD-10-CM

## 2025-02-24 DIAGNOSIS — E03.9 HYPOTHYROIDISM, UNSPECIFIED TYPE: ICD-10-CM

## 2025-02-24 PROCEDURE — 71275 CT ANGIOGRAPHY CHEST: CPT

## 2025-02-24 PROCEDURE — 2550000001 HC RX 255 CONTRASTS

## 2025-02-24 RX ORDER — LEVOTHYROXINE SODIUM 125 UG/1
125 TABLET ORAL DAILY
Qty: 90 TABLET | Refills: 0 | Status: SHIPPED | OUTPATIENT
Start: 2025-02-24

## 2025-02-24 RX ADMIN — IOHEXOL 75 ML: 350 INJECTION, SOLUTION INTRAVENOUS at 16:42

## 2025-02-26 ENCOUNTER — TELEPHONE (OUTPATIENT)
Dept: PRIMARY CARE | Facility: CLINIC | Age: 75
End: 2025-02-26
Payer: MEDICARE

## 2025-02-26 DIAGNOSIS — I77.1 SUBCLAVIAN ARTERIAL STENOSIS: Primary | ICD-10-CM

## 2025-02-26 NOTE — RESULT ENCOUNTER NOTE
Cellulitis  Cellulitis is an infection of the deep layers of skin. A break in the skin, such as a cut or scratch, can let bacteria under the skin. If the bacteria get to deep layers of the skin, it can be serious. If not treated, cellulitis can get into the bloodstream and lymph nodes. The infection can then spread throughout the body. This causes serious illness.  Cellulitis causes the affected skin to become red, swollen, warm, and sore. The reddened areas have a visible border. An open sore may leak fluid (pus). You may have a fever, chills, and pain.  Cellulitis is treated with antibiotics taken for 7 to 10 days. An open sore may be cleaned and covered with cool wet gauze. Symptoms should get better 1 to 2 days after treatment is started. Make sure to take all the antibiotics for the full number of days until they are gone. Keep taking the medicine even if your symptoms go away.  Home care  Follow these tips:  · Limit the use of the part of your body with cellulitis.   · If the infection is on your leg, keep your leg raised while sitting. This will help to reduce swelling.  · Take all of the antibiotic medicine exactly as directed until it is gone. Do not miss any doses, especially during the first 7 days. Don’t stop taking the medicine when your symptoms get better.  · Keep the affected area clean and dry.  · Wash your hands with soap and warm water before and after touching your skin. Anyone else who touches your skin should also wash his or her hands. Don't share towels.  Follow-up care  Follow up with your healthcare provider, or as advised. If your infection does not go away on the first antibiotic, your healthcare provider will prescribe a different one.  When to seek medical advice  Call your healthcare provider right away if any of these occur:  · Red areas that spread  · Swelling or pain that gets worse  · Fluid leaking from the skin (pus)  · Fever higher of 100.4º F (38.0º C) or higher after 2 days  CT does show extensive narrowing of the proximal left subclavian artery due to atherosclerotic plaque.  There is also extensive plaque in the coronary arteries.  Should follow-up with cardiology, it appears that she does follow with a cardiologist.  Important to continue aspirin and statin.  Will put in referral to vascular surgery to evaluate for her subclavian artery.  Order placed.    Please confirm patient understands results. on antibiotics  Date Last Reviewed: 9/1/2016  © 4582-4531 [employer]. [address] [place] [address], [place] [address]. All rights reserved. This information is not intended as a substitute for professional medical care. Always follow your healthcare professional's instructions.

## 2025-02-26 NOTE — TELEPHONE ENCOUNTER
----- Message from Christopher D'Amico sent at 2/26/2025  8:53 AM EST -----  CT does show extensive narrowing of the proximal left subclavian artery due to atherosclerotic plaque.  There is also extensive plaque in the coronary arteries.  Should follow-up with cardiology, it appears that she does follow with a cardiologist.  Important to continue aspirin and statin.  Will put in referral to vascular surgery to evaluate for her subclavian artery.  Order placed.    Please confirm patient understands results.

## 2025-02-26 NOTE — TELEPHONE ENCOUNTER
Result Communication    Resulted Orders   CT angio chest w and wo IV contrast    Narrative    Interpreted By:  Vladislav Collins,   STUDY:  CT ANGIO CHEST W AND WO IV CONTRAST;  2/24/2025 5:01 pm      INDICATION:  Signs/Symptoms:Left subclavian artery calcification.      ,I25.10 Atherosclerotic heart disease of native coronary artery  without angina pectoris      COMPARISON:  None.      ACCESSION NUMBER(S):  [id number]      ORDERING CLINICIAN:  CHRISTOPHER D'AMICO      TECHNIQUE:  Using multi-detector CT technology, axial, sequential imaging with  prospective gating was performed of the chest following the  intravenous administration of 75 ML Omnipaque 350.      For optimization of anatomic evaluation, multiplanar reconstruction,  maximum intensity projections, and advanced 3-D off-line  postprocessing were performed on a dedicated stand-alone workstation  by the interpreting physician.      FINDINGS:  Potential study limitations:  None.      THORACIC AORTA:  The thoracic aorta is normal in course, caliber, and contour.  The sinotubular junction is  preserved.  There is no evidence for acute aortic pathology, such as dissection,  intramural hematoma, or contained rupture. The arch vessel branching  pattern is  conventional. There is extensive atherosclerotic  calcification of the thoracic aorta with calcified and noncalcified  plaques. Significant atherosclerotic calcifications of the aortic  arch and proximal arch vessels. Atherosclerotic calcification results  in 50-75% narrowing of the proximal left subclavian artery consistent  with recent low-dose chest CT.. Visualized proximal abdominal aorta  is normal. The celiac trunk, SMA and bilateral renal arteries are  normal in caliber.      REPRESENTATIVE MEASUREMENTS OF THE AORTA:  Mid ascending thoracic aorta: 2.7 x 2.7.  Mid descending thoracic aorta: 1.8 x 1.9 cm      CORONARY ARTERIES:  The examination is not optimized for assessment of the coronary  arteries.  There is severe coronary artery calcifications.          PULMONARY ARTERIES:  The central pulmonary arteries appear  normal (MPA-   cm, RPA-   cm,  LPA-  cm).      SYSTEMIC AND PULMONARY VEINS:  Normal systemic venous and pulmonary venous return.  The SVC and IVC are of normal caliber.  Normal pulmonary venous anatomy.      CARDIAC CHAMBERS:  Normal atrioventricular and ventriculoarterial concordance.      LEFT ATRIUM:  Normal size  RIGHT ATRIUM:  Normal size (Area-  INTERATRIAL SEPTUM:  Intact.      LEFT VENTRICLE:  Normal size      RIGHT VENTRICLE:  Normal size      INTERVENTRICULAR SEPTUM:  Intact.      AORTIC VALVE:  The aortic valve is  trileaflet in morphology.  No calcifications.      MITRAL VALVE:  No thickening/calcification.      PERICARDIUM:  There is no pericardial effusion or thickening.      CHEST:  Trachea and central airways are patent. No endobronchial lesion.  There is moderate emphysematous changes. There is peribronchial  thickening.  2 mm subpleural right lower lobe parenchymal lung nodule.  There is no significant axillary or mediastinal lymph nodes. No hilar  adenopathy. Visualized thyroid is intact.  Esophagus is normal.      UPPER ABDOMEN:  Limited imaging through the upper abdomen reveals no abnormalities of  the visualized organs.      BONE AND SOFT TISSUE:  No suspicious osseous abnormality.        Impression    1.  There is moderate to severe narrowing of the proximal left  subclavian artery due to extensive calcified atherosclerotic plaque.  Correlate with any upper extremity claudication or asymmetric blood  pressures.  2. Extensive atherosclerotic changes of the coronary arteries.  3. Moderate emphysematous changes.          MACRO:  None      Signed by: Vladislav Collins 2/25/2025 10:00 AM  Dictation workstation:   [id number]       11:38 AM      Results were not successfully communicated with the patient and they did not acknowledge their understanding.

## 2025-03-03 ENCOUNTER — APPOINTMENT (OUTPATIENT)
Dept: CARDIOLOGY | Facility: CLINIC | Age: 75
End: 2025-03-03
Payer: MEDICARE

## 2025-03-04 DIAGNOSIS — E11.9 CONTROLLED TYPE 2 DIABETES MELLITUS WITHOUT COMPLICATION, WITHOUT LONG-TERM CURRENT USE OF INSULIN (MULTI): ICD-10-CM

## 2025-03-04 RX ORDER — SEMAGLUTIDE 0.68 MG/ML
0.5 INJECTION, SOLUTION SUBCUTANEOUS
Qty: 3 ML | Refills: 5 | OUTPATIENT
Start: 2025-03-04

## 2025-03-10 DIAGNOSIS — E11.9 CONTROLLED TYPE 2 DIABETES MELLITUS WITHOUT COMPLICATION, WITHOUT LONG-TERM CURRENT USE OF INSULIN (MULTI): ICD-10-CM

## 2025-03-10 PROCEDURE — RXMED WILLOW AMBULATORY MEDICATION CHARGE

## 2025-03-10 RX ORDER — SEMAGLUTIDE 0.68 MG/ML
0.25 INJECTION, SOLUTION SUBCUTANEOUS WEEKLY
Qty: 3 ML | Refills: 0 | Status: SHIPPED | OUTPATIENT
Start: 2025-03-10

## 2025-03-11 DIAGNOSIS — E11.9 TYPE 2 DIABETES MELLITUS WITHOUT COMPLICATION, WITHOUT LONG-TERM CURRENT USE OF INSULIN (MULTI): ICD-10-CM

## 2025-03-11 RX ORDER — BLOOD-GLUCOSE METER
EACH MISCELLANEOUS
Qty: 100 EACH | Refills: 3 | Status: SHIPPED | OUTPATIENT
Start: 2025-03-11

## 2025-03-12 ENCOUNTER — PHARMACY VISIT (OUTPATIENT)
Dept: PHARMACY | Facility: CLINIC | Age: 75
End: 2025-03-12
Payer: COMMERCIAL

## 2025-03-12 NOTE — PROGRESS NOTES
Endocrinology  3/13/2025    History of Present Illness   Kathy Jimenez is a 74 y.o. year old female with medical history of hypothyroidism, T2D, HLD, HTN, here for hypothyroidism.     Here today with , Fermin.     Diagnosed with hypothyroidism many years ago.   Reports many family members have thyroid disease.     Current regimen: Levothyroxine 125 mcg daily - has been on this dose for many years   Takes this in the middle of the night   Forgets maybe once monthly   Prior regimen: Unknown     Weight changes: Has lost weight since starting ozempic for her diabetes    Heat or cold intolerance: +cold intolerance    Palpitations: Denies    Tremor: Denies    Bowel changes: +constipation - also started since starting ozempic    Difficulty sleeping: Denies    Menstrual cycles: post-menopausal     Neck pain: Denies    Difficulty breathing: Denies    Difficulty swallowing: Denies    Change in voice: Denies      Family history of thyroid disease: Yes - siblings, father    History of radiation to the head/neck/chest: Denies    Amiodarone use: No    Biotin use: No      Medications     Current Outpatient Medications   Medication Instructions    albuterol 90 mcg/actuation inhaler Inhale.    amlodipine-olmesartan (Precious) 5-40 mg tablet 1 tablet, oral, Daily    amoxicillin (AMOXIL) 500 mg, 4 times daily    artificial tears, dextran-hypomel-glycerin, 0.1-0.3-0.2 % ophthalmic solution 4 times daily    aspirin 81 mg, Daily    atorvastatin (LIPITOR) 20 mg, oral, Daily    blood sugar diagnostic (OneTouch Verio test strips) strip use to check blood sugar 3 times daily    buPROPion XL (WELLBUTRIN XL) 150 mg, oral, Daily    calcium carbonate (Oscal) 500 mg calcium (1,250 mg) tablet 1 tablet, Daily    carvedilol (COREG) 12.5 mg, oral, 2 times daily    chlorhexidine (Peridex) 0.12 % solution RINSE AND SPIT WITH 1/2 OUNCE TWICE DAILY IN THE MORNING AND BEFORE BED    cholecalciferol (Vitamin D-3) 50 mcg (2,000 unit) capsule 1 capsule,  Daily    cloNIDine (CATAPRES) 0.1 mg, oral, 2 times daily    estradiol (Estrace) 0.01 % (0.1 mg/gram) vaginal cream Place a pea-sized or dime sized amount vaginally 3 times a week.    furosemide (Lasix) 20 mg tablet 1 tablet, Daily    Jardiance 25 mg, oral, Daily    lancets (OneTouch Delica Plus Lancet) 33 gauge misc USE AS DIRECETED TO TEST BLOOD SUGAR THREE TIMES DAILY    levothyroxine (SYNTHROID, LEVOXYL) 125 mcg, oral, Daily    magnesium citrate solution 195 mL, oral, Daily PRN    multivitamin tablet 1 tablet, Daily    OneTouch Verio Flex meter misc Use to test blood glucose daily    Ozempic 0.25 mg, subcutaneous, Weekly    PARoxetine (Paxil) 10 mg tablet 1 tablet, Daily    pyridoxine (Vitamin B-6) 100 mg tablet 1 tablet, Daily    vitamin E mixed 400 unit capsule Take by mouth.          History      Past Medical History:   Diagnosis Date    Cataract     History of cataract    COPD (chronic obstructive pulmonary disease) (Multi)     Displaced bimalleolar fracture of unspecified lower leg, initial encounter for closed fracture 09/29/2014    Closed bimalleolar fracture    Diverticulitis     Hypothyroid     Neuropathy     Osteoporosis     Pulmonary hypertension (Multi)     Retinopathy     Type 2 diabetes mellitus        Past Surgical History:   Procedure Laterality Date    CATARACT EXTRACTION  02/13/2019    Cataract surgery    COLONOSCOPY  10/31/2013    Complete Colonoscopy    COLONOSCOPY W/ POLYPECTOMY  06/19/2014    Complete Colonoscopy For Polyp Removal       Family History   Problem Relation Name Age of Onset    Lung cancer Father      Diabetes Sister      Diabetes Brother          Allergies   Allergen Reactions    Sulfa (Sulfonamide Antibiotics) Nausea/vomiting        Social history:   - No alcohol use   - +1-1.5 PPD, started smoking age 22s   - Denies recreational drug use   - Worked in [employer] - loved her job   - Lives at home with . Daughter lives with her since COVID - marine   and started WFH during COVID. They all love it.     Physical Exam   /68 (BP Location: Right arm, Patient Position: Sitting, BP Cuff Size: Adult)   Pulse 75   Temp 35.2 °C (95.4 °F) (Temporal)   Ht 1.524 m (5')   Wt 48.1 kg (106 lb)   BMI 20.70 kg/m²   General: Well appearing, no acute distress  Heart: Normal rate  Neck: Soft, nontender, no lymphadenopathy, thyroid not palpable   Lungs: Breathing comfortably on room air   Extremities: Warm, no edema  Skin: No rashes      Labs and Imaging     Lab Results   Component Value Date    TSH 7.33 (H) 10/18/2024    FREET4 1.88 (H) 10/18/2024         Assessment and Plan   Kathy Jimenez is a 74 y.o. year old female with medical history of hypothyroidism, T2D, HLD, HTN, here for hypothyroidism. Recent TSHs indicate mildly elevated TSH. She is clinically euthyroid. She was previously taking her levothyroxine with other medications and not on an empty stomach. She has since started taking it correctly. Will repeat TSH now and adjust LT4 if needed.     # Hypothyroidism:   - TSH now   - Continue levothyroxine 125 mcg daily     RTC: 1 year, sooner if needed for medication adjustment      Ambar Desai, DO   Endocrinology

## 2025-03-13 ENCOUNTER — APPOINTMENT (OUTPATIENT)
Dept: ENDOCRINOLOGY | Facility: CLINIC | Age: 75
End: 2025-03-13
Payer: MEDICARE

## 2025-03-13 VITALS
BODY MASS INDEX: 20.81 KG/M2 | HEIGHT: 60 IN | HEART RATE: 75 BPM | SYSTOLIC BLOOD PRESSURE: 148 MMHG | WEIGHT: 106 LBS | TEMPERATURE: 95.4 F | DIASTOLIC BLOOD PRESSURE: 68 MMHG

## 2025-03-13 DIAGNOSIS — E03.9 HYPOTHYROIDISM, UNSPECIFIED TYPE: ICD-10-CM

## 2025-03-13 DIAGNOSIS — E11.9 TYPE 2 DIABETES MELLITUS WITHOUT COMPLICATION, WITHOUT LONG-TERM CURRENT USE OF INSULIN (MULTI): ICD-10-CM

## 2025-03-13 PROCEDURE — 1159F MED LIST DOCD IN RCRD: CPT | Performed by: STUDENT IN AN ORGANIZED HEALTH CARE EDUCATION/TRAINING PROGRAM

## 2025-03-13 PROCEDURE — G2211 COMPLEX E/M VISIT ADD ON: HCPCS | Performed by: STUDENT IN AN ORGANIZED HEALTH CARE EDUCATION/TRAINING PROGRAM

## 2025-03-13 PROCEDURE — 3008F BODY MASS INDEX DOCD: CPT | Performed by: STUDENT IN AN ORGANIZED HEALTH CARE EDUCATION/TRAINING PROGRAM

## 2025-03-13 PROCEDURE — 3078F DIAST BP <80 MM HG: CPT | Performed by: STUDENT IN AN ORGANIZED HEALTH CARE EDUCATION/TRAINING PROGRAM

## 2025-03-13 PROCEDURE — 99204 OFFICE O/P NEW MOD 45 MIN: CPT | Performed by: STUDENT IN AN ORGANIZED HEALTH CARE EDUCATION/TRAINING PROGRAM

## 2025-03-13 PROCEDURE — 3077F SYST BP >= 140 MM HG: CPT | Performed by: STUDENT IN AN ORGANIZED HEALTH CARE EDUCATION/TRAINING PROGRAM

## 2025-03-13 ASSESSMENT — PATIENT HEALTH QUESTIONNAIRE - PHQ9
SUM OF ALL RESPONSES TO PHQ9 QUESTIONS 1 AND 2: 0
2. FEELING DOWN, DEPRESSED OR HOPELESS: NOT AT ALL
1. LITTLE INTEREST OR PLEASURE IN DOING THINGS: NOT AT ALL

## 2025-03-13 ASSESSMENT — ENCOUNTER SYMPTOMS
DEPRESSION: 0
OCCASIONAL FEELINGS OF UNSTEADINESS: 1
LOSS OF SENSATION IN FEET: 0

## 2025-03-14 RX ORDER — LANCETS 33 GAUGE
EACH MISCELLANEOUS
Qty: 100 EACH | Refills: 3 | Status: SHIPPED | OUTPATIENT
Start: 2025-03-14

## 2025-03-17 ENCOUNTER — OFFICE VISIT (OUTPATIENT)
Dept: CARDIOLOGY | Facility: CLINIC | Age: 75
End: 2025-03-17
Payer: MEDICARE

## 2025-03-17 VITALS
BODY MASS INDEX: 20.12 KG/M2 | OXYGEN SATURATION: 100 % | DIASTOLIC BLOOD PRESSURE: 72 MMHG | WEIGHT: 103 LBS | HEART RATE: 80 BPM | SYSTOLIC BLOOD PRESSURE: 152 MMHG

## 2025-03-17 DIAGNOSIS — E78.01 FAMILIAL HYPERCHOLESTEROLEMIA: ICD-10-CM

## 2025-03-17 DIAGNOSIS — I10 ESSENTIAL HYPERTENSION: Primary | ICD-10-CM

## 2025-03-17 DIAGNOSIS — R93.1 ABNORMAL SCREENING CARDIAC CT: ICD-10-CM

## 2025-03-17 LAB
ATRIAL RATE: 74 BPM
P AXIS: 87 DEGREES
P OFFSET: 203 MS
P ONSET: 150 MS
PR INTERVAL: 148 MS
Q ONSET: 224 MS
QRS COUNT: 12 BEATS
QRS DURATION: 88 MS
QT INTERVAL: 374 MS
QTC CALCULATION(BAZETT): 415 MS
QTC FREDERICIA: 401 MS
R AXIS: 85 DEGREES
T AXIS: 75 DEGREES
T OFFSET: 411 MS
VENTRICULAR RATE: 74 BPM

## 2025-03-17 PROCEDURE — 1159F MED LIST DOCD IN RCRD: CPT | Performed by: NURSE PRACTITIONER

## 2025-03-17 PROCEDURE — 1160F RVW MEDS BY RX/DR IN RCRD: CPT | Performed by: NURSE PRACTITIONER

## 2025-03-17 PROCEDURE — 93005 ELECTROCARDIOGRAM TRACING: CPT | Performed by: NURSE PRACTITIONER

## 2025-03-17 PROCEDURE — 99214 OFFICE O/P EST MOD 30 MIN: CPT | Performed by: NURSE PRACTITIONER

## 2025-03-17 PROCEDURE — 1126F AMNT PAIN NOTED NONE PRSNT: CPT | Performed by: NURSE PRACTITIONER

## 2025-03-17 PROCEDURE — 3077F SYST BP >= 140 MM HG: CPT | Performed by: NURSE PRACTITIONER

## 2025-03-17 PROCEDURE — 93010 ELECTROCARDIOGRAM REPORT: CPT | Performed by: INTERNAL MEDICINE

## 2025-03-17 PROCEDURE — 3078F DIAST BP <80 MM HG: CPT | Performed by: NURSE PRACTITIONER

## 2025-03-17 ASSESSMENT — PAIN SCALES - GENERAL: PAINLEVEL_OUTOF10: 0-NO PAIN

## 2025-03-17 NOTE — PROGRESS NOTES
Subjective   Kathy Jimenez is a 74 y.o. female.    Chief Complaint:  Hypertension and Hyperlipidemia    Mrs. Jimenez returns for a routine follow up. She is seen in collaboration with Dr. Combs. She has been feeling well from a cardiac standpoint. She has remained compliant with her medications, denying any intolerances. She remains fairly active with house work and walking, denying any exertional chest pain or shortness of breath. She denies any recent ER visits or hospitalizations. She offers no specific cardiovascular complaints or concerns today. She denies any complaints of chest pain, shortness of breath, lightheadedness, dizziness, palpitations, syncope, orthopnea, paroxysmal nocturnal dyspnea, lower extremity swelling or bleeding concerns.          Review of Systems   All other systems reviewed and are negative.      Objective   Physical Exam  Constitutional:       Appearance: Healthy appearance. In no distress  Pulmonary:      Effort: Pulmonary effort is normal.      Breath sounds: Normal breath sounds.   Cardiovascular:      Normal rate. Regular rhythm. Normal S1. Normal S2.       Murmurs: There is no murmur.      Carotids: right carotid pulse +2, no bruit heard over the right carotid. left carotid pulse +2, no bruit heard over the left carotid.  Edema:     Peripheral edema absent.   Abdominal:      Palpations: Abdomen is soft.   Musculoskeletal:       Cervical back: Normal range of motion.   Skin:     General: Skin is warm and dry. Normal color and pigmentation   Neurological:      Mental Status: Alert and oriented to person, place and time.   Psychiatric:     Mood and Affect: appropriate mood and appropriate affect.     EKG obtained and reviewed. Normal sinus rhythm. Septal infarct, age undetermined. HR 74    Lab Review:   Lab Results   Component Value Date     10/18/2024    K 4.8 10/18/2024    CL 99 10/18/2024    CO2 32 10/18/2024    BUN 17 10/18/2024    CREATININE 0.55 (L) 02/21/2025     GLUCOSE 124 (H) 10/18/2024    CALCIUM 9.7 10/18/2024     Lab Results   Component Value Date    WBC 13.0 (H) 10/18/2024    HGB 15.4 10/18/2024    HCT 48.3 (H) 10/18/2024    MCV 93 10/18/2024     10/18/2024     Lab Results   Component Value Date    CHOL 141 10/18/2024    TRIG 104 10/18/2024    HDL 52.8 10/18/2024       Assessment/Plan   Mrs. Jimenez is a pleasant 74-year-old  female with a past medical history significant for hypertension, hyperlipidemia, COPD, nicotine dependence, T2DM, elevated CACS 950.1 (6/2020) and short term memory loss. NM stress test 7/2020 showed no evidence of ischemia with normal LV size and function. Echocardiogram 3/2022 showed an EF of 60-65%. She presents today for routine follow up stable from a cardiac standpoint. Her EKG remains stable. Her BP is mildly elevated, though she remains on a fairly good antihypertensive regimen. She remains on appropriate antiplatelet and lipid lowering therapy with her LDL at goal. She seems to be getting around reasonably well, denying any exertional intolerances. I will have her continue all medications unchanged. We will not embark on any additional cardiovascular testing at this time. She will follow up with us in clinic in one year. She knows to call for any concerns.

## 2025-03-24 ENCOUNTER — APPOINTMENT (OUTPATIENT)
Dept: PRIMARY CARE | Facility: CLINIC | Age: 75
End: 2025-03-24
Payer: MEDICARE

## 2025-03-24 VITALS
BODY MASS INDEX: 20.22 KG/M2 | WEIGHT: 103 LBS | OXYGEN SATURATION: 95 % | HEART RATE: 77 BPM | DIASTOLIC BLOOD PRESSURE: 71 MMHG | SYSTOLIC BLOOD PRESSURE: 150 MMHG | HEIGHT: 60 IN

## 2025-03-24 DIAGNOSIS — E11.9 TYPE 2 DIABETES MELLITUS WITHOUT COMPLICATION, WITHOUT LONG-TERM CURRENT USE OF INSULIN: ICD-10-CM

## 2025-03-24 DIAGNOSIS — E03.9 HYPOTHYROIDISM, UNSPECIFIED TYPE: ICD-10-CM

## 2025-03-24 DIAGNOSIS — R41.3 MEMORY DEFICIT: ICD-10-CM

## 2025-03-24 DIAGNOSIS — E55.9 VITAMIN D DEFICIENCY: ICD-10-CM

## 2025-03-24 DIAGNOSIS — I10 ESSENTIAL HYPERTENSION: ICD-10-CM

## 2025-03-24 DIAGNOSIS — I10 HYPERTENSION, UNSPECIFIED TYPE: Primary | ICD-10-CM

## 2025-03-24 DIAGNOSIS — E78.5 HYPERLIPIDEMIA, UNSPECIFIED HYPERLIPIDEMIA TYPE: ICD-10-CM

## 2025-03-24 DIAGNOSIS — E11.40 TYPE 2 DIABETES MELLITUS WITH DIABETIC NEUROPATHY, WITHOUT LONG-TERM CURRENT USE OF INSULIN: ICD-10-CM

## 2025-03-24 DIAGNOSIS — J43.9 PULMONARY EMPHYSEMA, UNSPECIFIED EMPHYSEMA TYPE (MULTI): ICD-10-CM

## 2025-03-24 DIAGNOSIS — I27.20 PULMONARY HYPERTENSION, UNSPECIFIED (MULTI): ICD-10-CM

## 2025-03-24 DIAGNOSIS — N39.41 URGE INCONTINENCE: ICD-10-CM

## 2025-03-24 PROCEDURE — 1159F MED LIST DOCD IN RCRD: CPT | Performed by: STUDENT IN AN ORGANIZED HEALTH CARE EDUCATION/TRAINING PROGRAM

## 2025-03-24 PROCEDURE — 1124F ACP DISCUSS-NO DSCNMKR DOCD: CPT | Performed by: STUDENT IN AN ORGANIZED HEALTH CARE EDUCATION/TRAINING PROGRAM

## 2025-03-24 PROCEDURE — 3078F DIAST BP <80 MM HG: CPT | Performed by: STUDENT IN AN ORGANIZED HEALTH CARE EDUCATION/TRAINING PROGRAM

## 2025-03-24 PROCEDURE — 99214 OFFICE O/P EST MOD 30 MIN: CPT | Performed by: STUDENT IN AN ORGANIZED HEALTH CARE EDUCATION/TRAINING PROGRAM

## 2025-03-24 PROCEDURE — 3008F BODY MASS INDEX DOCD: CPT | Performed by: STUDENT IN AN ORGANIZED HEALTH CARE EDUCATION/TRAINING PROGRAM

## 2025-03-24 PROCEDURE — 1160F RVW MEDS BY RX/DR IN RCRD: CPT | Performed by: STUDENT IN AN ORGANIZED HEALTH CARE EDUCATION/TRAINING PROGRAM

## 2025-03-24 PROCEDURE — G2211 COMPLEX E/M VISIT ADD ON: HCPCS | Performed by: STUDENT IN AN ORGANIZED HEALTH CARE EDUCATION/TRAINING PROGRAM

## 2025-03-24 PROCEDURE — 3077F SYST BP >= 140 MM HG: CPT | Performed by: STUDENT IN AN ORGANIZED HEALTH CARE EDUCATION/TRAINING PROGRAM

## 2025-03-24 RX ORDER — CLONIDINE HYDROCHLORIDE 0.1 MG/1
0.1 TABLET ORAL 2 TIMES DAILY
Qty: 180 TABLET | Refills: 1 | Status: SHIPPED | OUTPATIENT
Start: 2025-03-24 | End: 2025-09-20

## 2025-03-24 RX ORDER — CARVEDILOL 12.5 MG/1
12.5 TABLET ORAL 2 TIMES DAILY
Qty: 180 TABLET | Refills: 1 | Status: SHIPPED | OUTPATIENT
Start: 2025-03-24

## 2025-03-24 ASSESSMENT — ENCOUNTER SYMPTOMS
LOSS OF SENSATION IN FEET: 0
DEPRESSION: 0
OCCASIONAL FEELINGS OF UNSTEADINESS: 0

## 2025-03-24 ASSESSMENT — COLUMBIA-SUICIDE SEVERITY RATING SCALE - C-SSRS
2. HAVE YOU ACTUALLY HAD ANY THOUGHTS OF KILLING YOURSELF?: NO
1. IN THE PAST MONTH, HAVE YOU WISHED YOU WERE DEAD OR WISHED YOU COULD GO TO SLEEP AND NOT WAKE UP?: NO
6. HAVE YOU EVER DONE ANYTHING, STARTED TO DO ANYTHING, OR PREPARED TO DO ANYTHING TO END YOUR LIFE?: NO

## 2025-03-24 NOTE — PROGRESS NOTES
74-year-old female presenting for follow-up on multiple concerns.  Doing relatively well.    HTN  Blood pressure mostly stable, tends to be around goal, sometimes higher.  Tolerates regimen well.  Following with cardiology.     Hypothyroidism  Tolerating regimen well.  Continues to have slightly elevated TSH and T4.  Established with endocrinology.  Repeat labs pending.     DMII  Stable and doing very well on current regimen.     HLD  Stable     Urge incontinence, uterovaginal prolapse  Following with urogynecology, stable and doing well.    Emphysema/COPD  Seen on lung cancer screening, unmedicated, asymptomatic.    Pulmonary hypertension  Following with cardiology    Memory deficit  Has to repeat questions frequently.  No safety concerns.    SocHx:   - Smoking: Daily, 55-pack-year history    12 point ROS reviewed and negative other than as stated in HPI      General: Alert, oriented, pleasant, in no acute distress  HEENT:      Head: normocephalic, atraumatic;      eyes: EOMI, no scleral icterus;   Neck: soft, supple, non-tender, no masses appreciated  CV: Heart with regular rate and rhythm, normal S1/S2, no murmurs  Lungs: CTAB without wheezing, rhonchi or rales; good respiratory effort, no increased work of breathing  Neuro: Cranial nerves grossly intact; alert and oriented  Psych: Appropriate mood and affect    #HTN  - BP slightly elevated today  - Continue carvedilol 12.5 mg twice daily, amlodipine-olmesartan 5-40 mg daily, clonidine 0.1 mg twice daily  -Renal artery ultrasound negative, 24-hour metanephrines, aldosterone/renal activity negative  -Continue with cardiology    #DMII #Proteinuria  -Most recent A1c at 6.4, 10/2024  -Currently on Ozempic 0.5 mg weekly Jardiance 25 mg daily  -Continue to follow with [facility] pharmacist  -[facility] ophthalmology  - Follows with podiatry  - Repeat A1c, albumin     #Hypothyroidism  -Continue levothyroxine 125 mcg daily  -Continue to follow with endocrinology  -Repeat lab pending  through endocrinology     #HLD  -Continue atorvastatin 20 mg daily  -Repeat lipid panel    #Urge incontinence #uterovaginal prolapse  -Continue to follow with urogynecology    #Empysema/COPD  -Stable without treatment  -Smoking cessation encouraged    #Pulmonary HTN  -Continue to follow with cardiology    #Memory deficits  -Neurology referral    Follow-up 4-6 months    Chris D'Amico, DO

## 2025-04-10 ENCOUNTER — APPOINTMENT (OUTPATIENT)
Dept: PHARMACY | Facility: HOSPITAL | Age: 75
End: 2025-04-10
Payer: MEDICARE

## 2025-04-10 DIAGNOSIS — E11.9 CONTROLLED TYPE 2 DIABETES MELLITUS WITHOUT COMPLICATION, WITHOUT LONG-TERM CURRENT USE OF INSULIN: ICD-10-CM

## 2025-04-10 DIAGNOSIS — E11.9 TYPE 2 DIABETES MELLITUS WITHOUT COMPLICATION, WITHOUT LONG-TERM CURRENT USE OF INSULIN: ICD-10-CM

## 2025-04-10 RX ORDER — SEMAGLUTIDE 0.68 MG/ML
0.25 INJECTION, SOLUTION SUBCUTANEOUS WEEKLY
Qty: 9 ML | Refills: 3 | Status: SHIPPED | OUTPATIENT
Start: 2025-04-10

## 2025-04-10 NOTE — PROGRESS NOTES
Pharmacist Clinic: Diabetes Management  Kathy Jimenez is a 74 y.o. female was referred to Clinical Pharmacy Team for diabetes management.     Referring Provider: Christopher D'Amico, DO     HISTORY OF PRESENT ILLNESS  Patient has been well controlled, at last visit patient A1c was 7.3 %  Patient has diabetes on both sides of her family.   Patient suffers from short term memory loss, today on the phone I spoke with her and her .   Patient does not limit herself with what she eats, she has 3 well balanced meals per day. Avoids greasy/fatty/fried foods.     LAB REVIEW   Glucose (mg/dL)   Date Value   10/18/2024 124 (H)   09/04/2024 121 (H)   04/10/2024 119 (H)     Hemoglobin A1C (%)   Date Value   10/18/2024 6.4 (H)   09/04/2024 6.3 (H)   04/10/2024 7.3 (H)     Bicarbonate (mmol/L)   Date Value   10/18/2024 32   09/04/2024 31   04/10/2024 33 (H)     Urea Nitrogen (mg/dL)   Date Value   10/18/2024 17   09/04/2024 13   04/10/2024 13     Creatinine (mg/dL)   Date Value   10/18/2024 0.59   09/04/2024 0.59   04/10/2024 0.61     CREATININE (mg/dL)   Date Value   02/21/2025 0.55 (L)     Lab Results   Component Value Date    HGBA1C 6.4 (H) 10/18/2024    HGBA1C 6.3 (H) 09/04/2024    HGBA1C 7.3 (H) 04/10/2024     Lab Results   Component Value Date    CHOL 141 10/18/2024    CHOL 131 04/10/2024    CHOL 131 10/25/2023     Lab Results   Component Value Date    HDL 52.8 10/18/2024    HDL 50.0 04/10/2024    HDL 44.6 10/25/2023     Lab Results   Component Value Date    LDLCALC 67 10/18/2024    LDLCALC 56 04/10/2024    LDLCALC 58 10/25/2023     Lab Results   Component Value Date    TRIG 104 10/18/2024    TRIG 125 04/10/2024    TRIG 142 10/25/2023       DIABETES ASSESSMENT    CURRENT PHARMACOTHERAPY  - jardiance 25 mg once daily  - ozempic 0.25 mg once weekly (Fridays); higher doses caused weight loss    SECONDARY PREVENTION  - Statin? Yes  - ACE-I/ARB? Yes  - Aspirin? No    SMBG MEASUREMENTS:   - Since last speaking, her  meter broke    HISTORICAL PHARMACOTHERAPY:   - metformin  mg take 2 tablets by mouth every day    DISCUSSION:   Discussed A1c came back at 6.4%, this is well below goal of 7%, congrats!   Patient is tolerating current medication regimen   Ozempic:   She is tolerating the medication without issues  She reports mild constipation, counseled on OTC laxatives (miralax/metamucil daily, colace as needed)   Jardiance:   She is tolerating the medication without issues, she reports she had issues urinating however it has since resolved since her pessary was removed  Discussed her urologist advised we evaluate potential for jardiance to be causing these side effects, however given well control of DM and resolution of symptoms post pessary removal, we do not think the jardiance is contributing     RECOMMENDATIONS/PLAN  1. Patients diabetes is well controlled with most recent A1c of 6.4% (goal < 7 %).   - Continue all meds under the continuation of care with the referring provider and clinical pharmacy team.    Clinical Pharmacist follow up: annually   PAP Approval: 4/10/2025    Thank you,   Cammie Trinh, PharmD     Verbal consent to manage patient's drug therapy was obtained from the patient. They were informed they may decline to participate or withdraw from participation in pharmacy services at any time.

## 2025-04-14 DIAGNOSIS — I10 ESSENTIAL HYPERTENSION: ICD-10-CM

## 2025-04-14 LAB — TSH SERPL-ACNC: 6.86 MIU/L (ref 0.4–4.5)

## 2025-04-15 DIAGNOSIS — E03.9 HYPOTHYROIDISM, UNSPECIFIED TYPE: Primary | ICD-10-CM

## 2025-04-15 LAB
ALBUMIN SERPL-MCNC: 4.3 G/DL (ref 3.6–5.1)
ALBUMIN/CREAT UR: 53 MG/G CREAT
ALP SERPL-CCNC: 55 U/L (ref 37–153)
ALT SERPL-CCNC: 20 U/L (ref 6–29)
ANION GAP SERPL CALCULATED.4IONS-SCNC: 11 MMOL/L (CALC) (ref 7–17)
AST SERPL-CCNC: 24 U/L (ref 10–35)
BILIRUB SERPL-MCNC: 0.6 MG/DL (ref 0.2–1.2)
BUN SERPL-MCNC: 15 MG/DL (ref 7–25)
CALCIUM SERPL-MCNC: 9.7 MG/DL (ref 8.6–10.4)
CHLORIDE SERPL-SCNC: 101 MMOL/L (ref 98–110)
CHOLEST SERPL-MCNC: 141 MG/DL
CHOLEST/HDLC SERPL: 2.5 (CALC)
CO2 SERPL-SCNC: 27 MMOL/L (ref 20–32)
CREAT SERPL-MCNC: 0.57 MG/DL (ref 0.6–1)
CREAT UR-MCNC: 51 MG/DL (ref 20–275)
EGFRCR SERPLBLD CKD-EPI 2021: 96 ML/MIN/1.73M2
ERYTHROCYTE [DISTWIDTH] IN BLOOD BY AUTOMATED COUNT: 11.9 % (ref 11–15)
EST. AVERAGE GLUCOSE BLD GHB EST-MCNC: 154 MG/DL
EST. AVERAGE GLUCOSE BLD GHB EST-SCNC: 8.5 MMOL/L
GLUCOSE SERPL-MCNC: 121 MG/DL (ref 65–99)
HBA1C MFR BLD: 7 %
HCT VFR BLD AUTO: 45.5 % (ref 35–45)
HDLC SERPL-MCNC: 56 MG/DL
HGB BLD-MCNC: 14.5 G/DL (ref 11.7–15.5)
LDLC SERPL CALC-MCNC: 67 MG/DL (CALC)
MCH RBC QN AUTO: 29.8 PG (ref 27–33)
MCHC RBC AUTO-ENTMCNC: 31.9 G/DL (ref 32–36)
MCV RBC AUTO: 93.6 FL (ref 80–100)
MICROALBUMIN UR-MCNC: 2.7 MG/DL
NONHDLC SERPL-MCNC: 85 MG/DL (CALC)
PLATELET # BLD AUTO: 242 THOUSAND/UL (ref 140–400)
PMV BLD REES-ECKER: 9.4 FL (ref 7.5–12.5)
POTASSIUM SERPL-SCNC: 4.2 MMOL/L (ref 3.5–5.3)
PROT SERPL-MCNC: 6.5 G/DL (ref 6.1–8.1)
RBC # BLD AUTO: 4.86 MILLION/UL (ref 3.8–5.1)
SODIUM SERPL-SCNC: 139 MMOL/L (ref 135–146)
TRIGL SERPL-MCNC: 98 MG/DL
WBC # BLD AUTO: 9.5 THOUSAND/UL (ref 3.8–10.8)

## 2025-04-15 RX ORDER — LEVOTHYROXINE SODIUM 137 UG/1
137 TABLET ORAL DAILY
Qty: 90 TABLET | Refills: 3 | Status: SHIPPED | OUTPATIENT
Start: 2025-04-15 | End: 2026-04-15

## 2025-04-15 RX ORDER — AMLODIPINE BESYLATE AND OLMESARTAN MEDOXOMIL 5; 40 MG/1; MG/1
1 TABLET ORAL DAILY
Qty: 90 TABLET | Refills: 3 | Status: SHIPPED | OUTPATIENT
Start: 2025-04-15

## 2025-04-18 DIAGNOSIS — E11.9 CONTROLLED TYPE 2 DIABETES MELLITUS WITHOUT COMPLICATION, WITHOUT LONG-TERM CURRENT USE OF INSULIN: ICD-10-CM

## 2025-04-22 ENCOUNTER — TELEMEDICINE (OUTPATIENT)
Dept: PHARMACY | Facility: HOSPITAL | Age: 75
End: 2025-04-22
Payer: MEDICARE

## 2025-04-22 DIAGNOSIS — E11.9 CONTROLLED TYPE 2 DIABETES MELLITUS WITHOUT COMPLICATION, WITHOUT LONG-TERM CURRENT USE OF INSULIN: Primary | ICD-10-CM

## 2025-04-22 PROCEDURE — RXMED WILLOW AMBULATORY MEDICATION CHARGE

## 2025-04-22 NOTE — PROGRESS NOTES
Pharmacist Clinic: Diabetes Management  Kathy Jimenez is a 74 y.o. female was referred to Clinical Pharmacy Team for diabetes management and chronic kidney disease management. .     Referring Provider: Christopher D'Amico, DO     HISTORY OF PRESENT ILLNESS  Patient has been well controlled, at last visit patient A1c was 7.3 %  Patient has diabetes on both sides of her family.   Patient suffers from short term memory loss, today on the phone I spoke with her and her .   Patient does not limit herself with what she eats, she has 3 well balanced meals per day. Avoids greasy/fatty/fried foods.     LAB REVIEW   GLUCOSE (mg/dL)   Date Value   04/14/2025 121 (H)     Glucose (mg/dL)   Date Value   10/18/2024 124 (H)   09/04/2024 121 (H)   04/10/2024 119 (H)     HEMOGLOBIN A1c (%)   Date Value   04/14/2025 7.0 (H)     Hemoglobin A1C (%)   Date Value   10/18/2024 6.4 (H)   09/04/2024 6.3 (H)   04/10/2024 7.3 (H)     CARBON DIOXIDE (mmol/L)   Date Value   04/14/2025 27     Bicarbonate (mmol/L)   Date Value   10/18/2024 32   09/04/2024 31   04/10/2024 33 (H)     UREA NITROGEN (BUN) (mg/dL)   Date Value   04/14/2025 15     Urea Nitrogen (mg/dL)   Date Value   10/18/2024 17   09/04/2024 13   04/10/2024 13     Creatinine (mg/dL)   Date Value   10/18/2024 0.59   09/04/2024 0.59   04/10/2024 0.61     CREATININE (mg/dL)   Date Value   04/14/2025 0.57 (L)   02/21/2025 0.55 (L)     Lab Results   Component Value Date    HGBA1C 7.0 (H) 04/14/2025    HGBA1C 6.4 (H) 10/18/2024    HGBA1C 6.3 (H) 09/04/2024     Lab Results   Component Value Date    CHOL 141 04/14/2025    CHOL 141 10/18/2024    CHOL 131 04/10/2024     Lab Results   Component Value Date    HDL 56 04/14/2025    HDL 52.8 10/18/2024    HDL 50.0 04/10/2024     Lab Results   Component Value Date    LDLCALC 67 04/14/2025    LDLCALC 67 10/18/2024    LDLCALC 56 04/10/2024     Lab Results   Component Value Date    TRIG 98 04/14/2025    TRIG 104 10/18/2024    TRIG 125  04/10/2024     ALBUMIN/CREATININE RATIO, RANDOM URINE   Date Value Ref Range Status   04/14/2025 53 (H) <30 mg/g creat Final     Comment:        The ADA defines abnormalities in albumin  excretion as follows:     Albuminuria Category        Result (mg/g creatinine)     Normal to Mildly increased   <30  Moderately increased            Severely increased           > OR = 300     The ADA recommends that at least two of three  specimens collected within a 3-6 month period be  abnormal before considering a patient to be  within a diagnostic category.       Albumin/Creatinine Ratio   Date Value Ref Range Status   10/18/2024 93.9 (H) <30.0 ug/mg Creat Final   10/25/2023 78.7 (H) <30.0 ug/mg Creat Final     DIABETES ASSESSMENT    CURRENT PHARMACOTHERAPY  - jardiance 25 mg once daily  - ozempic 0.25 mg once weekly (Fridays); higher doses caused weight loss    SECONDARY PREVENTION  - Statin? Yes  - ACE-I/ARB? Yes  - Aspirin? No    SMBG MEASUREMENTS:   - Since last speaking, her meter broke    HISTORICAL PHARMACOTHERAPY:   - metformin  mg take 2 tablets by mouth every day    DISCUSSION:   Discussed A1c came back at 7%, this is well below goal of 7%, congrats!   Patient is tolerating current medication regimen   Ozempic:   She is tolerating the medication without issues  She reports mild constipation, counseled on OTC laxatives (miralax/metamucil daily, colace as needed)   Jardiance:   She is tolerating the medication without issues, she reports she had issues urinating however it has since resolved since her pessary was removed  Discussed her urologist advised we evaluate potential for jardiance to be causing these side effects, however given well control of DM and resolution of symptoms post pessary removal, we do not think the jardiance is contributing     CKD ASSESSMENT    CURRENT PHARMACOTHERAPY  - ACE-I/ARB? Yes; olmesartan  - SGLT2-I? Yes; jardiance    Last UACR: 93.9    DISCUSSION:   Discussed kerendia 10 mg  once daily  Went over MOA, expectations, side effects, monitoring parameters   Answered all questions and concerns     RECOMMENDATIONS/PLAN  1. Patients diabetes is well controlled with most recent A1c of 7 % (goal < 7 %).   - Continue jardiance 25 mg once daily and ozempic 0.25 mg once weekly.   - Continue all meds under the continuation of care with the referring provider and clinical pharmacy team.    2. Patients CKD is worsening, with uACR of 93.9, goal is under 30.   - Start kerendia 10 mg once daily.     Clinical Pharmacist follow up: 1 week, go over official start of kerendia    PAP Approval: 4/10/2025    Thank you,   Cammie Trinh, PharmD     Verbal consent to manage patient's drug therapy was obtained from the patient. They were informed they may decline to participate or withdraw from participation in pharmacy services at any time.

## 2025-04-24 ENCOUNTER — PHARMACY VISIT (OUTPATIENT)
Dept: PHARMACY | Facility: CLINIC | Age: 75
End: 2025-04-24
Payer: COMMERCIAL

## 2025-04-28 ENCOUNTER — APPOINTMENT (OUTPATIENT)
Dept: PHARMACY | Facility: HOSPITAL | Age: 75
End: 2025-04-28
Payer: MEDICARE

## 2025-04-28 DIAGNOSIS — E11.9 CONTROLLED TYPE 2 DIABETES MELLITUS WITHOUT COMPLICATION, WITHOUT LONG-TERM CURRENT USE OF INSULIN: Primary | ICD-10-CM

## 2025-04-28 NOTE — PROGRESS NOTES
Pharmacist Clinic: Diabetes Management  Kathy Jimenez is a 74 y.o. female was referred to Clinical Pharmacy Team for diabetes management and chronic kidney disease management. .     Referring Provider: Christopher D'Amico, DO     HISTORY OF PRESENT ILLNESS  Patient has been well controlled, at last visit patient A1c was 7.3 %  Patient has diabetes on both sides of her family.   Patient suffers from short term memory loss, today on the phone I spoke with her and her .   Patient does not limit herself with what she eats, she has 3 well balanced meals per day. Avoids greasy/fatty/fried foods.     LAB REVIEW   GLUCOSE (mg/dL)   Date Value   04/14/2025 121 (H)     Glucose (mg/dL)   Date Value   10/18/2024 124 (H)   09/04/2024 121 (H)   04/10/2024 119 (H)     HEMOGLOBIN A1c (%)   Date Value   04/14/2025 7.0 (H)     Hemoglobin A1C (%)   Date Value   10/18/2024 6.4 (H)   09/04/2024 6.3 (H)   04/10/2024 7.3 (H)     CARBON DIOXIDE (mmol/L)   Date Value   04/14/2025 27     Bicarbonate (mmol/L)   Date Value   10/18/2024 32   09/04/2024 31   04/10/2024 33 (H)     UREA NITROGEN (BUN) (mg/dL)   Date Value   04/14/2025 15     Urea Nitrogen (mg/dL)   Date Value   10/18/2024 17   09/04/2024 13   04/10/2024 13     Creatinine (mg/dL)   Date Value   10/18/2024 0.59   09/04/2024 0.59   04/10/2024 0.61     CREATININE (mg/dL)   Date Value   04/14/2025 0.57 (L)   02/21/2025 0.55 (L)     Lab Results   Component Value Date    HGBA1C 7.0 (H) 04/14/2025    HGBA1C 6.4 (H) 10/18/2024    HGBA1C 6.3 (H) 09/04/2024     Lab Results   Component Value Date    CHOL 141 04/14/2025    CHOL 141 10/18/2024    CHOL 131 04/10/2024     Lab Results   Component Value Date    HDL 56 04/14/2025    HDL 52.8 10/18/2024    HDL 50.0 04/10/2024     Lab Results   Component Value Date    LDLCALC 67 04/14/2025    LDLCALC 67 10/18/2024    LDLCALC 56 04/10/2024     Lab Results   Component Value Date    TRIG 98 04/14/2025    TRIG 104 10/18/2024    TRIG 125  04/10/2024     ALBUMIN/CREATININE RATIO, RANDOM URINE   Date Value Ref Range Status   04/14/2025 53 (H) <30 mg/g creat Final     Comment:        The ADA defines abnormalities in albumin  excretion as follows:     Albuminuria Category        Result (mg/g creatinine)     Normal to Mildly increased   <30  Moderately increased            Severely increased           > OR = 300     The ADA recommends that at least two of three  specimens collected within a 3-6 month period be  abnormal before considering a patient to be  within a diagnostic category.       Albumin/Creatinine Ratio   Date Value Ref Range Status   10/18/2024 93.9 (H) <30.0 ug/mg Creat Final   10/25/2023 78.7 (H) <30.0 ug/mg Creat Final     DIABETES ASSESSMENT    CURRENT PHARMACOTHERAPY  - jardiance 25 mg once daily  - ozempic 0.25 mg once weekly (Fridays); higher doses caused weight loss    SECONDARY PREVENTION  - Statin? Yes  - ACE-I/ARB? Yes  - Aspirin? No    SMBG MEASUREMENTS:   - Since last speaking, her meter broke    HISTORICAL PHARMACOTHERAPY:   - metformin  mg take 2 tablets by mouth every day    DISCUSSION:   Discussed A1c came back at 7%, this is well below goal of 7%, congrats!   Patient is tolerating current medication regimen   Ozempic:   She is tolerating the medication without issues  She reports mild constipation, counseled on OTC laxatives (miralax/metamucil daily, colace as needed)   Jardiance:   She is tolerating the medication without issues, she reports she had issues urinating however it has since resolved since her pessary was removed  Discussed her urologist advised we evaluate potential for jardiance to be causing these side effects, however given well control of DM and resolution of symptoms post pessary removal, we do not think the jardiance is contributing     CKD ASSESSMENT    CURRENT PHARMACOTHERAPY  - ACE-I/ARB? Yes; olmesartan  - SGLT2-I? Yes; jardiance    Last UACR: 93.9    DISCUSSION:   Discussed kerendia 10 mg  once daily  Went over MOA, expectations, side effects, monitoring parameters   Answered all questions and concerns   Given patients BP has been well controlled, and we are adding a BP medication, we will discontinue clonidine   Counseled patient, answered all questions  Advised patient to test BP on a regular basis and write the numbers down    RECOMMENDATIONS/PLAN  1. Patients diabetes is well controlled with most recent A1c of 7 % (goal < 7 %).   - Continue jardiance 25 mg once daily and ozempic 0.25 mg once weekly.   - Continue all meds under the continuation of care with the referring provider and clinical pharmacy team.    2. Patients CKD is worsening, with uACR of 93.9, goal is under 30.   - Start kerendia 10 mg once daily.   - Stop clonidine 0.1 mg by mouth twice daily and check blood pressure daily.     Clinical Pharmacist follow up: 3 weeks, go over BP readings and order labs post kerendia start    PAP Approval: 4/10/2025    Thank you,   Cammie Trinh, PharmD     Verbal consent to manage patient's drug therapy was obtained from the patient. They were informed they may decline to participate or withdraw from participation in pharmacy services at any time.

## 2025-04-30 PROCEDURE — RXMED WILLOW AMBULATORY MEDICATION CHARGE

## 2025-05-02 ENCOUNTER — PHARMACY VISIT (OUTPATIENT)
Dept: PHARMACY | Facility: CLINIC | Age: 75
End: 2025-05-02
Payer: COMMERCIAL

## 2025-05-02 ENCOUNTER — APPOINTMENT (OUTPATIENT)
Dept: UROLOGY | Facility: CLINIC | Age: 75
End: 2025-05-02
Payer: MEDICARE

## 2025-05-02 DIAGNOSIS — K59.00 CONSTIPATION, UNSPECIFIED CONSTIPATION TYPE: ICD-10-CM

## 2025-05-02 DIAGNOSIS — N81.4 PROLAPSE, UTEROVAGINAL: ICD-10-CM

## 2025-05-02 DIAGNOSIS — N32.81 OAB (OVERACTIVE BLADDER): Primary | ICD-10-CM

## 2025-05-02 PROCEDURE — G2211 COMPLEX E/M VISIT ADD ON: HCPCS | Performed by: STUDENT IN AN ORGANIZED HEALTH CARE EDUCATION/TRAINING PROGRAM

## 2025-05-02 PROCEDURE — 99214 OFFICE O/P EST MOD 30 MIN: CPT | Performed by: STUDENT IN AN ORGANIZED HEALTH CARE EDUCATION/TRAINING PROGRAM

## 2025-05-02 PROCEDURE — 1159F MED LIST DOCD IN RCRD: CPT | Performed by: STUDENT IN AN ORGANIZED HEALTH CARE EDUCATION/TRAINING PROGRAM

## 2025-05-02 RX ORDER — VIBEGRON 75 MG/1
1 TABLET, FILM COATED ORAL DAILY
Qty: 28 TABLET | Refills: 0 | COMMUNITY
Start: 2025-05-02

## 2025-05-02 ASSESSMENT — ENCOUNTER SYMPTOMS: FEVER: 0

## 2025-05-02 NOTE — PROGRESS NOTES
History Of Present Illness  Kathy Jimenez is a 74 y.o. female presenting for follow up of urinary symptoms with trial of period without pessary. Previously had 2.5 inch Gellhorn short stem that was removed because she felt it was causing her to experience urinary urgency and frequency. She was not interested in bladder medications at that time. She had previously also struggled with constipation and has been taking Miralax.     Previously discussed options of expectant management, pelvic floor physical therapy, pessary management, and surgery had been discussed with patient. She wishes to exhaust all medical therapies before proceeding with any definitive surgical options.     PVR 33 Jan 2025 visit. She is now ready for OAB medication. She cannot recall trying anything prior. She also desires to have pessary replaced since having it out did not improve her symptoms      Past Medical History  She has a past medical history of Cataract, COPD (chronic obstructive pulmonary disease) (Multi), Displaced bimalleolar fracture of unspecified lower leg, initial encounter for closed fracture (09/29/2014), Diverticulitis, Hypothyroid, Neuropathy, Osteoporosis, Pulmonary hypertension (Multi), Retinopathy, and Type 2 diabetes mellitus.    Surgical History  She has a past surgical history that includes Colonoscopy (10/31/2013); Colonoscopy w/ polypectomy (06/19/2014); and Cataract extraction (02/13/2019).     Social History  She reports that she has been smoking cigarettes. She has never used smokeless tobacco. No history on file for alcohol use and drug use.    Family History  Family History[1]     Allergies  Sulfa (sulfonamide antibiotics)    Review of Systems   Constitutional:  Negative for fever.   All other systems reviewed and are negative.       Physical Exam  Con: awake, alert, NAD  HEENT: normocephalic, speech normal  CV: no peripheral edema  Resp: no increased work of breathing  Neuro: normal mentation  Psych: mood  normal  Skin: no rash  : vulva normal       Last Recorded Vitals  There were no vitals taken for this visit.       Assessment/Plan     Prolapse:  2.5 inch Gellhorn short stem pessary replace. Follow up in 3-4 months for pessary check    OAB:  Gemtesa samples provided and pt referred to pharmacy pt assistance program. She requested info on 3rd line options but I did discuss that insurance will likely require her to try 2 medications first    Constipation:  Discussed to continue daily Miralax and recommended mag citrate    Ursula Perez MD, Gynecologist  Female Reconstruction & Sexual Medicine Fellow  Dept of Urology/OBGYN  5/2/2025           [1]   Family History  Problem Relation Name Age of Onset    Lung cancer Father      Diabetes Sister      Diabetes Brother

## 2025-05-03 PROCEDURE — RXMED WILLOW AMBULATORY MEDICATION CHARGE

## 2025-05-06 ENCOUNTER — PHARMACY VISIT (OUTPATIENT)
Dept: PHARMACY | Facility: CLINIC | Age: 75
End: 2025-05-06
Payer: COMMERCIAL

## 2025-05-09 ENCOUNTER — TELEMEDICINE (OUTPATIENT)
Dept: PHARMACY | Facility: HOSPITAL | Age: 75
End: 2025-05-09
Payer: MEDICARE

## 2025-05-09 DIAGNOSIS — N32.81 OAB (OVERACTIVE BLADDER): ICD-10-CM

## 2025-05-09 PROCEDURE — RXMED WILLOW AMBULATORY MEDICATION CHARGE

## 2025-05-09 RX ORDER — VIBEGRON 75 MG/1
75 TABLET, FILM COATED ORAL DAILY
Qty: 30 TABLET | Refills: 11 | Status: SHIPPED | OUTPATIENT
Start: 2025-05-09

## 2025-05-09 NOTE — Clinical Note
I believe pt already sent you a mychart message about pessary but I also wanted to let you know just in case that pt reports her pessary rotated day after placement so she took it out

## 2025-05-09 NOTE — PROGRESS NOTES
Patient ID: Kathy Jimenez is a 74 y.o. female who presents for No chief complaint on file..    Pt is here for First appointment.     Referring Provider: Ursula Perez MD  Reason for Referral: cost assistance     Subjective     Medication and allergy reconciliation completed     Drug Interactions  {Drug Interactions:31665}    Medication System Management  Patient's preferred pharmacy:     [id number] [address] [facility] [place]  [address] [facility] [address]  Phone: [phone] Fax: [phone]    [facility] - [place], [place] - [address]  [address]  [address]  [facility] [address]  Phone: [phone] Fax: [phone]    Count includes the [facility]  [address], [address]  [facility] [address]  Phone: [phone] Fax: [phone]    Adherence/Organization: Great   Affordability/Accessibility: Assess for [facility]      Medications Ordered Prior to Encounter[1]        Vaginal Symptoms  Vaginal Dryness: Improved with Estrace   Dysuria (pain, burning, stinging, or itching with urination): No   Vaginal and/or vulvar irritation/itching: No   Recurrent urinary tract infections: No    Urinary Symptoms  Medications that may contribute to symptoms:   Jardiance/Kerendia - am      anticholinergics, alpha blockers (doxazosin, prazosin, terazosin), tricyclic antidepressants, antipsychotics (lithium, clozapine), calcium channel blockers (causes bladder to relax and affects ability to empty properly), opioids (impair bladder contraction), antihistamines (diphenhydramine, chlorpheniramine), pseudoephedrine, phenylephrine, SGLT2 inhibitors (increases the amount of glucose and fluid excreted through kidneys).   Any history of:   Narrow-angle glaucoma: No  Impaired gastric emptying: No  Urinary retention: No    Prediabetes or diabetes:  Type 2 diabetes  Lab Results   Component Value Date    GLUCOSE 121 (H) 04/14/2025    HGBA1C 7.0 (H) 04/14/2025    HGBA1C  "6.4 (H) 10/18/2024    HGBA1C 6.3 (H) 09/04/2024     Frequently of bowel movement: 3-4 times daily   Tobacco use: 1-1.5 ppd  How many protective undergarments are you utilizing daily: Depends during the day just to be safe     What medications have been tried/stopped?   None   Current medication? Gemtesa 75 mg daily through samples   When started? A week ago   Side effects? No   Improvement in symptoms? Yes       Cardiovascular Health  The 10-year ASCVD risk score (Rios CARBONE, et al., 2019) is: 48%    Values used to calculate the score:      Age: 74 years      Sex: Female      Is Non- : No      Diabetic: Yes      Tobacco smoker: Yes      Systolic Blood Pressure: 150 mmHg      Is BP treated: Yes      HDL Cholesterol: 56 mg/dL      Total Cholesterol: 141 mg/dL    Lab Results   Component Value Date    CHOL 141 04/14/2025     Lab Results   Component Value Date    HDL 56 04/14/2025     Lab Results   Component Value Date    LDLCALC 67 04/14/2025     Lab Results   Component Value Date    TRIG 98 04/14/2025     No components found for: \"CHOLHDL\"     Vitals  BP Readings from Last 2 Encounters:   03/24/25 150/71   03/17/25 152/72     BMI Readings from Last 1 Encounters:   03/24/25 20.12 kg/m²        BMP  Lab Results   Component Value Date    CALCIUM 9.7 04/14/2025     04/14/2025    K 4.2 04/14/2025    CO2 27 04/14/2025     04/14/2025    BUN 15 04/14/2025    CREATININE 0.57 (L) 04/14/2025    EGFR 96 04/14/2025       LFTs  Lab Results   Component Value Date    ALT 20 04/14/2025    AST 24 04/14/2025    ALKPHOS 55 04/14/2025    BILITOT 0.6 04/14/2025         Assessment/Plan     Patient is experiencing urinary frequency, urgency, and incontinence. ***  Has tried before none   Patient approved for  VAF, already approved for PAP for other information (not needed for Gemtesa as it is covered completely)  Patient confirms their income is within the below guidelines  Patient confirms they live in " [facility]  Patient confirms they have current insurance that covers medications  Patient confirms they are not part of the Medicare Prescription Payment Program (MPPP)    2025 Poverty Guidelines     Persons in family/household 400% federal Poverty Limit    1 62,600    2 84,600    3 106,600    4 128,600    5 150,600    6 172,600    7 194,600    8 216,600        Gemtesa   Discussed MOA: works by activating beta-3 adrenergic receptors in the bladder resulting in relaxation of the detrusor smooth muscle during the urine storage phase, thus increasing bladder capacity.  Provided education on administration and potential side effects including but not limited to hypertension 9%, hot flashes <2%, constipation/diarrhea <2%, dry mouth <2%, and headache <4%.   Gemtesa (vibegron) starts working almost immediately - within a few days of first taking it, with noticeable improvements in urinary urgency, frequency, and incontinence noted in clinical trials at 2 weeks which were reported as significant by 12 weeks.    CONTINUE  Gemtesa 75 mg once daily    Follow-up: 6/6/2025 at 9:20 am      Time spent with pt: Total length of time *** (minutes) of the encounter and more than 50% was spent counseling the patient.    Sofia Woods, PharmD      Continue all meds under the continuation of care with the referring provider and clinical pharmacy team.    Verbal consent to manage patient's drug therapy was obtained from the patient. They were informed they may decline to participate or withdraw from participation in pharmacy services at any time.          [1]   Current Outpatient Medications on File Prior to Visit   Medication Sig Dispense Refill    albuterol 90 mcg/actuation inhaler Inhale.      amlodipine-olmesartan (Precious) 5-40 mg tablet TAKE ONE TABLET BY MOUTH EVERY DAY 90 tablet 3    artificial tears, dextran-hypomel-glycerin, 0.1-0.3-0.2 % ophthalmic solution Administer into affected eye(s) 4 times a day.      aspirin 81 mg EC  tablet Take 1 tablet (81 mg) by mouth once daily.      atorvastatin (Lipitor) 20 mg tablet TAKE ONE TABLET BY MOUTH EVERY DAY 90 tablet 3    blood sugar diagnostic (OneTouch Verio test strips) strip use to check blood sugar 3 times daily 100 each 3    buPROPion XL (Wellbutrin XL) 150 mg 24 hr tablet Take 1 tablet (150 mg) by mouth once daily. 90 tablet 3    calcium carbonate (Oscal) 500 mg calcium (1,250 mg) tablet Take 1 tablet (1,250 mg) by mouth once daily.      carvedilol (Coreg) 12.5 mg tablet Take 1 tablet (12.5 mg) by mouth 2 times a day. 180 tablet 1    chlorhexidine (Peridex) 0.12 % solution       cholecalciferol (Vitamin D-3) 50 mcg (2,000 unit) capsule Take 1 capsule (50 mcg) by mouth once daily.      cloNIDine (Catapres) 0.1 mg tablet Take 1 tablet (0.1 mg) by mouth 2 times a day. (Patient not taking: Reported on 5/2/2025) 180 tablet 1    empagliflozin (Jardiance) 25 mg tablet Take 1 tablet (25 mg) by mouth once daily. 90 tablet 3    estradiol (Estrace) 0.01 % (0.1 mg/gram) vaginal cream Place a pea-sized or dime sized amount vaginally 3 times a week. 42.5 g 2    finerenone (Kerendia) 10 mg tablet tablet Take 1 tablet (10 mg) by mouth once daily. 90 tablet 1    furosemide (Lasix) 20 mg tablet Take 1 tablet (20 mg) by mouth once daily.      lancets (OneTouch Delica Plus Lancet) 33 gauge misc use as directed to test blood sugar three times daily 100 each 3    levothyroxine (Synthroid, Levoxyl) 137 mcg tablet Take 1 tablet (137 mcg) by mouth early in the morning.. 90 tablet 3    magnesium citrate solution Take 195 mL by mouth once daily as needed (constipation). 2000 mL 2    multivitamin tablet Take 1 tablet by mouth once daily.      OneTouch Verio Flex meter misc Use to test blood glucose daily 1 each 0    PARoxetine (Paxil) 10 mg tablet Take 1 tablet (10 mg) by mouth once daily.      pyridoxine (Vitamin B-6) 100 mg tablet Take 1 tablet (100 mg) by mouth once daily.      semaglutide (Ozempic) 0.25 mg or  0.5 mg (2 mg/3 mL) pen injector Inject 0.25 mg under the skin 1 (one) time per week. 9 mL 3    vibegron (Gemtesa) 75 mg tablet Take 1 tablet (75 mg) by mouth once daily. 28 tablet 0    vitamin E mixed 400 unit capsule Take by mouth.       No current facility-administered medications on file prior to visit.

## 2025-05-12 ENCOUNTER — APPOINTMENT (OUTPATIENT)
Dept: PHARMACY | Facility: HOSPITAL | Age: 75
End: 2025-05-12
Payer: MEDICARE

## 2025-05-13 ENCOUNTER — PHARMACY VISIT (OUTPATIENT)
Dept: PHARMACY | Facility: CLINIC | Age: 75
End: 2025-05-13
Payer: COMMERCIAL

## 2025-05-13 RX ORDER — POLYETHYLENE GLYCOL 3350 17 G/17G
17 POWDER, FOR SOLUTION ORAL DAILY
COMMUNITY

## 2025-05-19 ENCOUNTER — APPOINTMENT (OUTPATIENT)
Dept: PHARMACY | Facility: HOSPITAL | Age: 75
End: 2025-05-19
Payer: MEDICARE

## 2025-05-19 DIAGNOSIS — E11.9 CONTROLLED TYPE 2 DIABETES MELLITUS WITHOUT COMPLICATION, WITHOUT LONG-TERM CURRENT USE OF INSULIN: ICD-10-CM

## 2025-05-19 DIAGNOSIS — E11.9 TYPE 2 DIABETES MELLITUS WITHOUT COMPLICATION, WITHOUT LONG-TERM CURRENT USE OF INSULIN: ICD-10-CM

## 2025-05-19 DIAGNOSIS — N32.81 OAB (OVERACTIVE BLADDER): Primary | ICD-10-CM

## 2025-05-19 NOTE — PROGRESS NOTES
Pharmacist Clinic: Diabetes Management  Kathy Jimenez is a 74 y.o. female was referred to Clinical Pharmacy Team for diabetes management and chronic kidney disease management. .     Referring Provider: Christopher D'Amico, DO     HISTORY OF PRESENT ILLNESS  Patient has been well controlled, at last visit patient A1c was 7.3 %  Patient has diabetes on both sides of her family.   Patient suffers from short term memory loss, today on the phone I spoke with her and her .   Patient does not limit herself with what she eats, she has 3 well balanced meals per day. Avoids greasy/fatty/fried foods.     LAB REVIEW   GLUCOSE (mg/dL)   Date Value   04/14/2025 121 (H)     Glucose (mg/dL)   Date Value   10/18/2024 124 (H)   09/04/2024 121 (H)   04/10/2024 119 (H)     HEMOGLOBIN A1c (%)   Date Value   04/14/2025 7.0 (H)     Hemoglobin A1C (%)   Date Value   10/18/2024 6.4 (H)   09/04/2024 6.3 (H)   04/10/2024 7.3 (H)     CARBON DIOXIDE (mmol/L)   Date Value   04/14/2025 27     Bicarbonate (mmol/L)   Date Value   10/18/2024 32   09/04/2024 31   04/10/2024 33 (H)     UREA NITROGEN (BUN) (mg/dL)   Date Value   04/14/2025 15     Urea Nitrogen (mg/dL)   Date Value   10/18/2024 17   09/04/2024 13   04/10/2024 13     Creatinine (mg/dL)   Date Value   10/18/2024 0.59   09/04/2024 0.59   04/10/2024 0.61     CREATININE (mg/dL)   Date Value   04/14/2025 0.57 (L)   02/21/2025 0.55 (L)     Lab Results   Component Value Date    HGBA1C 7.0 (H) 04/14/2025    HGBA1C 6.4 (H) 10/18/2024    HGBA1C 6.3 (H) 09/04/2024     Lab Results   Component Value Date    CHOL 141 04/14/2025    CHOL 141 10/18/2024    CHOL 131 04/10/2024     Lab Results   Component Value Date    HDL 56 04/14/2025    HDL 52.8 10/18/2024    HDL 50.0 04/10/2024     Lab Results   Component Value Date    LDLCALC 67 04/14/2025    LDLCALC 67 10/18/2024    LDLCALC 56 04/10/2024     Lab Results   Component Value Date    TRIG 98 04/14/2025    TRIG 104 10/18/2024    TRIG 125  04/10/2024     ALBUMIN/CREATININE RATIO, RANDOM URINE   Date Value Ref Range Status   04/14/2025 53 (H) <30 mg/g creat Final     Comment:        The ADA defines abnormalities in albumin  excretion as follows:     Albuminuria Category        Result (mg/g creatinine)     Normal to Mildly increased   <30  Moderately increased            Severely increased           > OR = 300     The ADA recommends that at least two of three  specimens collected within a 3-6 month period be  abnormal before considering a patient to be  within a diagnostic category.       Albumin/Creatinine Ratio   Date Value Ref Range Status   10/18/2024 93.9 (H) <30.0 ug/mg Creat Final   10/25/2023 78.7 (H) <30.0 ug/mg Creat Final     DIABETES ASSESSMENT    CURRENT PHARMACOTHERAPY  - jardiance 25 mg once daily  - ozempic 0.25 mg once weekly (Fridays); higher doses caused weight loss    SECONDARY PREVENTION  - Statin? Yes  - ACE-I/ARB? Yes  - Aspirin? No    SMBG MEASUREMENTS:   - Since last speaking, her meter broke    HISTORICAL PHARMACOTHERAPY:   - metformin  mg take 2 tablets by mouth every day    DISCUSSION:   Discussed A1c came back at 7%, this is well below goal of 7%, congrats!   Patient is tolerating current medication regimen   Ozempic:   She is tolerating the medication without issues  She reports mild constipation, counseled on OTC laxatives (miralax/metamucil daily, colace as needed)   Jardiance:   She is tolerating the medication without issues, she reports she had issues urinating however it has since resolved since her pessary was removed  Discussed her urologist advised we evaluate potential for jardiance to be causing these side effects, however given well control of DM and resolution of symptoms post pessary removal, we do not think the jardiance is contributing     CKD ASSESSMENT    CURRENT PHARMACOTHERAPY  - ACE-I/ARB? Yes; olmesartan  - SGLT2-I? Yes; jardiance  - MRS? Yes, kerendia    Last UACR: 93.9    DISCUSSION:    Patient is tolerating kerendia 10 mg once daily   She denies any side effects at this time   She reports her BP is stable since starting kerendia and stopping clonidine   Advised patient to start checking BP on a regular basis at home     RECOMMENDATIONS/PLAN  1. Patients diabetes is well controlled with most recent A1c of 7 % (goal < 7 %).   - Continue jardiance 25 mg once daily and ozempic 0.25 mg once weekly.   - Continue all meds under the continuation of care with the referring provider and clinical pharmacy team.    2. Patients CKD is worsening, with uACR of 93.9, goal is under 30.   - Continue kerendia 10 mg once daily.   - Get your lab work done.     Clinical Pharmacist follow up: 1 week, go over BP readings and lab work    PAP Approval: 4/10/2025    Thank you,   Cammie Trinh, PharmD     Verbal consent to manage patient's drug therapy was obtained from the patient. They were informed they may decline to participate or withdraw from participation in pharmacy services at any time.

## 2025-05-20 LAB
ALBUMIN SERPL-MCNC: 4.2 G/DL (ref 3.6–5.1)
ALP SERPL-CCNC: 57 U/L (ref 37–153)
ALT SERPL-CCNC: 19 U/L (ref 6–29)
ANION GAP SERPL CALCULATED.4IONS-SCNC: 9 MMOL/L (CALC) (ref 7–17)
AST SERPL-CCNC: 23 U/L (ref 10–35)
BILIRUB SERPL-MCNC: 0.6 MG/DL (ref 0.2–1.2)
BUN SERPL-MCNC: 14 MG/DL (ref 7–25)
CALCIUM SERPL-MCNC: 9.8 MG/DL (ref 8.6–10.4)
CHLORIDE SERPL-SCNC: 99 MMOL/L (ref 98–110)
CO2 SERPL-SCNC: 29 MMOL/L (ref 20–32)
CREAT SERPL-MCNC: 0.61 MG/DL (ref 0.6–1)
EGFRCR SERPLBLD CKD-EPI 2021: 95 ML/MIN/1.73M2
GLUCOSE SERPL-MCNC: 117 MG/DL (ref 65–99)
POTASSIUM SERPL-SCNC: 4.8 MMOL/L (ref 3.5–5.3)
PROT SERPL-MCNC: 6.7 G/DL (ref 6.1–8.1)
SODIUM SERPL-SCNC: 137 MMOL/L (ref 135–146)

## 2025-05-20 PROCEDURE — RXMED WILLOW AMBULATORY MEDICATION CHARGE

## 2025-05-22 ENCOUNTER — PHARMACY VISIT (OUTPATIENT)
Dept: PHARMACY | Facility: CLINIC | Age: 75
End: 2025-05-22
Payer: COMMERCIAL

## 2025-05-23 ENCOUNTER — TELEMEDICINE (OUTPATIENT)
Dept: PHARMACY | Facility: HOSPITAL | Age: 75
End: 2025-05-23
Payer: MEDICARE

## 2025-05-23 DIAGNOSIS — E11.9 TYPE 2 DIABETES MELLITUS WITHOUT COMPLICATION, WITHOUT LONG-TERM CURRENT USE OF INSULIN: ICD-10-CM

## 2025-05-23 DIAGNOSIS — E11.9 CONTROLLED TYPE 2 DIABETES MELLITUS WITHOUT COMPLICATION, WITHOUT LONG-TERM CURRENT USE OF INSULIN: Primary | ICD-10-CM

## 2025-05-23 DIAGNOSIS — N32.81 OAB (OVERACTIVE BLADDER): ICD-10-CM

## 2025-05-23 NOTE — PROGRESS NOTES
Pharmacist Clinic: Diabetes Management  Kathy Jimenez is a 74 y.o. female was referred to Clinical Pharmacy Team for diabetes management and chronic kidney disease management. .     Referring Provider: Christopher D'Amico, DO     HISTORY OF PRESENT ILLNESS  Patient has been well controlled, at last visit patient A1c was 7.3 %  Patient has diabetes on both sides of her family.   Patient suffers from short term memory loss, today on the phone I spoke with her and her .   Patient does not limit herself with what she eats, she has 3 well balanced meals per day. Avoids greasy/fatty/fried foods.     LAB REVIEW   GLUCOSE (mg/dL)   Date Value   05/20/2025 117 (H)   04/14/2025 121 (H)     Glucose (mg/dL)   Date Value   10/18/2024 124 (H)   09/04/2024 121 (H)   04/10/2024 119 (H)     HEMOGLOBIN A1c (%)   Date Value   04/14/2025 7.0 (H)     Hemoglobin A1C (%)   Date Value   10/18/2024 6.4 (H)   09/04/2024 6.3 (H)   04/10/2024 7.3 (H)     CARBON DIOXIDE (mmol/L)   Date Value   05/20/2025 29   04/14/2025 27     Bicarbonate (mmol/L)   Date Value   10/18/2024 32   09/04/2024 31   04/10/2024 33 (H)     UREA NITROGEN (BUN) (mg/dL)   Date Value   05/20/2025 14   04/14/2025 15     Urea Nitrogen (mg/dL)   Date Value   10/18/2024 17   09/04/2024 13   04/10/2024 13     CREATININE (mg/dL)   Date Value   05/20/2025 0.61   04/14/2025 0.57 (L)   02/21/2025 0.55 (L)     Lab Results   Component Value Date    HGBA1C 7.0 (H) 04/14/2025    HGBA1C 6.4 (H) 10/18/2024    HGBA1C 6.3 (H) 09/04/2024     Lab Results   Component Value Date    CHOL 141 04/14/2025    CHOL 141 10/18/2024    CHOL 131 04/10/2024     Lab Results   Component Value Date    HDL 56 04/14/2025    HDL 52.8 10/18/2024    HDL 50.0 04/10/2024     Lab Results   Component Value Date    LDLCALC 67 04/14/2025    LDLCALC 67 10/18/2024    LDLCALC 56 04/10/2024     Lab Results   Component Value Date    TRIG 98 04/14/2025    TRIG 104 10/18/2024    TRIG 125 04/10/2024      ALBUMIN/CREATININE RATIO, RANDOM URINE   Date Value Ref Range Status   04/14/2025 53 (H) <30 mg/g creat Final     Comment:        The ADA defines abnormalities in albumin  excretion as follows:     Albuminuria Category        Result (mg/g creatinine)     Normal to Mildly increased   <30  Moderately increased            Severely increased           > OR = 300     The ADA recommends that at least two of three  specimens collected within a 3-6 month period be  abnormal before considering a patient to be  within a diagnostic category.       Albumin/Creatinine Ratio   Date Value Ref Range Status   10/18/2024 93.9 (H) <30.0 ug/mg Creat Final   10/25/2023 78.7 (H) <30.0 ug/mg Creat Final     DIABETES ASSESSMENT    CURRENT PHARMACOTHERAPY  - jardiance 25 mg once daily  - ozempic 0.25 mg once weekly (Fridays); higher doses caused weight loss    SECONDARY PREVENTION  - Statin? Yes  - ACE-I/ARB? Yes  - Aspirin? No    SMBG MEASUREMENTS:   - Since last speaking, her meter broke    HISTORICAL PHARMACOTHERAPY:   - metformin  mg take 2 tablets by mouth every day    DISCUSSION:   Discussed A1c came back at 7%, this is well below goal of 7%, congrats!   Patient is tolerating current medication regimen   Ozempic:   She is tolerating the medication without issues  She reports mild constipation, counseled on OTC laxatives (miralax/metamucil daily, colace as needed)   Jardiance:   She is tolerating the medication without issues, she reports she had issues urinating however it has since resolved since her pessary was removed  Discussed her urologist advised we evaluate potential for jardiance to be causing these side effects, however given well control of DM and resolution of symptoms post pessary removal, we do not think the jardiance is contributing     CKD ASSESSMENT    CURRENT PHARMACOTHERAPY  - ACE-I/ARB? Yes; olmesartan  - SGLT2-I? Yes; jardiance  - MRS? Yes, kerendia    Last UACR: 93.9    DISCUSSION:   Patient is  tolerating kerendia 10 mg once daily   She denies any side effects at this time   She reports her BP is stable around 130/90 since starting kerendia and stopping clonidine     RECOMMENDATIONS/PLAN  1. Patients diabetes is well controlled with most recent A1c of 7 % (goal < 7 %).   - Continue jardiance 25 mg once daily and ozempic 0.25 mg once weekly.   - Continue all meds under the continuation of care with the referring provider and clinical pharmacy team.    2. Patients CKD is worsening, with uACR of 93.9, goal is under 30.   - Continue kerendia 10 mg once daily.     Clinical Pharmacist follow up: 3 months   PAP Approval: 4/10/2025    Thank you,   Cammie Trinh, PharmD     Verbal consent to manage patient's drug therapy was obtained from the patient. They were informed they may decline to participate or withdraw from participation in pharmacy services at any time.

## 2025-05-28 DIAGNOSIS — F32.A DEPRESSION, UNSPECIFIED DEPRESSION TYPE: ICD-10-CM

## 2025-05-28 RX ORDER — BUPROPION HYDROCHLORIDE 150 MG/1
150 TABLET ORAL DAILY
Qty: 90 TABLET | Refills: 0 | Status: SHIPPED | OUTPATIENT
Start: 2025-05-28

## 2025-06-06 ENCOUNTER — APPOINTMENT (OUTPATIENT)
Dept: PHARMACY | Facility: HOSPITAL | Age: 75
End: 2025-06-06
Payer: MEDICARE

## 2025-06-06 DIAGNOSIS — N32.81 OAB (OVERACTIVE BLADDER): ICD-10-CM

## 2025-06-06 NOTE — PROGRESS NOTES
Patient ID: Kathy Jimenez is a 74 y.o. female who presents for Overactive bladder.    Pt is here for Follow Up appointment.     Referring Provider: Ursula Perez MD  Reason for Referral: cost assistance     Subjective     Medication and allergy reconciliation completed     Drug Interactions  No relevant drug interactions were noted.    Medication System Management  Patient's preferred pharmacy:     [id number] - [place], [place] - [address] [facility]  [address] [facility] [address]  Phone: [phone] Fax: [phone]    [facility] - [place], [place] - [address]  [address]  [address]  [facility] [address]  Phone: [phone] Fax: [phone]    [facility]  [address], [address]  [facility] [address]  Phone: [phone] Fax: [phone]    Adherence/Organization: Great   Affordability/Accessibility: Assess for [facility]      Medications Ordered Prior to Encounter[1]      Vaginal Symptoms  Vaginal Dryness: Improved with Estrace   Dysuria (pain, burning, stinging, or itching with urination): No   Vaginal and/or vulvar irritation/itching: No   Recurrent urinary tract infections: No    Urinary Symptoms  Medications that may contribute to symptoms:   Jardiance/Kerendia - patient confirms she takes in the morning am   Any history of:   Narrow-angle glaucoma: No  Impaired gastric emptying: No  Urinary retention: No    Prediabetes or diabetes:  Type 2 diabetes  Lab Results   Component Value Date    GLUCOSE 117 (H) 05/20/2025    HGBA1C 7.0 (H) 04/14/2025    HGBA1C 6.4 (H) 10/18/2024    HGBA1C 6.3 (H) 09/04/2024     Frequently of bowel movement: 3-4 times daily   Tobacco use: 1-1.5 ppd  How many protective undergarments are you utilizing daily: Depends during the day just to be safe     What medications have been tried/stopped?   None   Current medication? Gemtesa 75 mg daily   When started? About a month ago  Side effects? No   Improvement  "in symptoms? Yes       Cardiovascular Health  The 10-year ASCVD risk score (Rios CARBONE, et al., 2019) is: 48%    Values used to calculate the score:      Age: 74 years      Sex: Female      Is Non- : No      Diabetic: Yes      Tobacco smoker: Yes      Systolic Blood Pressure: 150 mmHg      Is BP treated: Yes      HDL Cholesterol: 56 mg/dL      Total Cholesterol: 141 mg/dL    Lab Results   Component Value Date    CHOL 141 04/14/2025     Lab Results   Component Value Date    HDL 56 04/14/2025     Lab Results   Component Value Date    LDLCALC 67 04/14/2025     Lab Results   Component Value Date    TRIG 98 04/14/2025     No components found for: \"CHOLHDL\"     Vitals  BP Readings from Last 2 Encounters:   03/24/25 150/71   03/17/25 152/72     BMI Readings from Last 1 Encounters:   03/24/25 20.12 kg/m²        BMP  Lab Results   Component Value Date    CALCIUM 9.8 05/20/2025     05/20/2025    K 4.8 05/20/2025    CO2 29 05/20/2025    CL 99 05/20/2025    BUN 14 05/20/2025    CREATININE 0.61 05/20/2025    EGFR 95 05/20/2025       LFTs  Lab Results   Component Value Date    ALT 19 05/20/2025    AST 23 05/20/2025    ALKPHOS 57 05/20/2025    BILITOT 0.6 05/20/2025         Assessment/Plan     Patient is experiencing urinary frequency and urgency. Of note, patient had pessary placed last appointment but the day after it was placed it rotated causing pain so patient took it out. She feels that \"things are dropping\" and that pessary might need replaced. Recommended she call office about getting appointment rescheduled/moved up. Gemtesa has improved her frequency, she is now rarely getting up during the night to urinate. Will continue with Gemtesa - PAP not needs as insurance covering fully.   Has tried before N/A  Patient approved for  VAF, already approved for PAP for other information (not needed for Gemtesa as it is covered completely)  Patient confirms their income is within the below " guidelines  Patient confirms they live in [facility] [facility]  Patient confirms they have current insurance that covers medications  Patient confirms they are not part of the Medicare Prescription Payment Program (MPPP)    2025 Poverty Guidelines     Persons in family/household 400% federal Poverty Limit    1 62,600    2 84,600    3 106,600    4 128,600    5 150,600    6 172,600    7 194,600    8 216,600        Gemtesa   Discussed MOA: works by activating beta-3 adrenergic receptors in the bladder resulting in relaxation of the detrusor smooth muscle during the urine storage phase, thus increasing bladder capacity.  Provided education on administration and potential side effects including but not limited to hypertension 9%, hot flashes <2%, constipation/diarrhea <2%, dry mouth <2%, and headache <4%.   Gemtesa (vibegron) starts working almost immediately - within a few days of first taking it, with noticeable improvements in urinary urgency, frequency, and incontinence noted in clinical trials at 2 weeks which were reported as significant by 12 weeks.    CONTINUE  Gemtesa 75 mg once daily    Follow-up: 12/5/2025 at 9:20 am      Time spent with pt: Total length of time 15 (minutes) of the encounter and more than 50% was spent counseling the patient.    Sofia Woods, PharmD      Continue all meds under the continuation of care with the referring provider and clinical pharmacy team.    Verbal consent to manage patient's drug therapy was obtained from the patient. They were informed they may decline to participate or withdraw from participation in pharmacy services at any time.          [1]   Current Outpatient Medications on File Prior to Visit   Medication Sig Dispense Refill    albuterol 90 mcg/actuation inhaler Inhale.      amlodipine-olmesartan (Precious) 5-40 mg tablet TAKE ONE TABLET BY MOUTH EVERY DAY 90 tablet 3    artificial tears, dextran-hypomel-glycerin, 0.1-0.3-0.2 % ophthalmic solution Administer into affected  eye(s) 4 times a day.      aspirin 81 mg EC tablet Take 1 tablet (81 mg) by mouth once daily.      atorvastatin (Lipitor) 20 mg tablet TAKE ONE TABLET BY MOUTH EVERY DAY 90 tablet 3    blood sugar diagnostic (OneTouch Verio test strips) strip use to check blood sugar 3 times daily 100 each 3    buPROPion XL (Wellbutrin XL) 150 mg 24 hr tablet TAKE ONE TABLET BY MOUTH EVERY DAY 90 tablet 0    calcium carbonate (Oscal) 500 mg calcium (1,250 mg) tablet Take 1 tablet (1,250 mg) by mouth once daily. (Patient taking differently: Take 2 tablets (2,500 mg) by mouth once daily.)      carvedilol (Coreg) 12.5 mg tablet Take 1 tablet (12.5 mg) by mouth 2 times a day. 180 tablet 1    chlorhexidine (Peridex) 0.12 % solution       cholecalciferol (Vitamin D-3) 50 mcg (2,000 unit) capsule Take 1 capsule (50 mcg) by mouth once daily.      docusate sodium (Colace) 50 mg capsule Take 1 capsule (50 mg) by mouth 2 times a day as needed for constipation.      empagliflozin (Jardiance) 25 mg tablet Take 1 tablet (25 mg) by mouth once daily. 90 tablet 3    estradiol (Estrace) 0.01 % (0.1 mg/gram) vaginal cream Place a pea-sized or dime sized amount vaginally 3 times a week. 42.5 g 2    finerenone (Kerendia) 10 mg tablet tablet Take 1 tablet (10 mg) by mouth once daily. 90 tablet 1    furosemide (Lasix) 20 mg tablet Take 1 tablet (20 mg) by mouth once daily. (Patient not taking: Reported on 5/9/2025)      lancets (OneTouch Delica Plus Lancet) 33 gauge misc use as directed to test blood sugar three times daily 100 each 3    levothyroxine (Synthroid, Levoxyl) 137 mcg tablet Take 1 tablet (137 mcg) by mouth early in the morning.. 90 tablet 3    magnesium citrate solution Take 195 mL by mouth once daily as needed (constipation). (Patient not taking: Reported on 5/9/2025) 2000 mL 2    multivitamin tablet Take 1 tablet by mouth once daily.      OneTouch Verio Flex meter misc Use to test blood glucose daily 1 each 0    PARoxetine (Paxil) 10 mg  tablet Take 1 tablet (10 mg) by mouth once daily. (Patient not taking: Reported on 5/9/2025)      polyethylene glycol (Glycolax, Miralax) 17 gram packet Take 17 g by mouth once daily.      psyllium (Metamucil) powder Take 1 Dose (5.8 g) by mouth once daily.      pyridoxine (Vitamin B-6) 100 mg tablet Take 1 tablet (100 mg) by mouth once daily.      semaglutide (Ozempic) 0.25 mg or 0.5 mg (2 mg/3 mL) pen injector Inject 0.25 mg under the skin 1 (one) time per week. 9 mL 3    vibegron (Gemtesa) 75 mg tablet Take 1 tablet (75 mg) by mouth once daily. 28 tablet 0    vibegron (Gemtesa) 75 mg tablet Take 1 tablet (75 mg) by mouth once daily. 30 tablet 11    vitamin E mixed 400 unit capsule Take by mouth.       No current facility-administered medications on file prior to visit.

## 2025-06-10 DIAGNOSIS — E03.9 HYPOTHYROIDISM, UNSPECIFIED TYPE: ICD-10-CM

## 2025-06-11 ENCOUNTER — OFFICE VISIT (OUTPATIENT)
Dept: VASCULAR SURGERY | Facility: CLINIC | Age: 75
End: 2025-06-11
Payer: MEDICARE

## 2025-06-11 VITALS
HEART RATE: 79 BPM | DIASTOLIC BLOOD PRESSURE: 52 MMHG | BODY MASS INDEX: 19.65 KG/M2 | SYSTOLIC BLOOD PRESSURE: 104 MMHG | HEIGHT: 60 IN | WEIGHT: 100.1 LBS

## 2025-06-11 DIAGNOSIS — I77.1 SUBCLAVIAN ARTERIAL STENOSIS: ICD-10-CM

## 2025-06-11 PROCEDURE — 1159F MED LIST DOCD IN RCRD: CPT | Performed by: STUDENT IN AN ORGANIZED HEALTH CARE EDUCATION/TRAINING PROGRAM

## 2025-06-11 PROCEDURE — 3008F BODY MASS INDEX DOCD: CPT | Performed by: STUDENT IN AN ORGANIZED HEALTH CARE EDUCATION/TRAINING PROGRAM

## 2025-06-11 PROCEDURE — 99203 OFFICE O/P NEW LOW 30 MIN: CPT | Performed by: STUDENT IN AN ORGANIZED HEALTH CARE EDUCATION/TRAINING PROGRAM

## 2025-06-11 PROCEDURE — 99213 OFFICE O/P EST LOW 20 MIN: CPT | Performed by: STUDENT IN AN ORGANIZED HEALTH CARE EDUCATION/TRAINING PROGRAM

## 2025-06-11 PROCEDURE — 3074F SYST BP LT 130 MM HG: CPT | Performed by: STUDENT IN AN ORGANIZED HEALTH CARE EDUCATION/TRAINING PROGRAM

## 2025-06-11 PROCEDURE — 3078F DIAST BP <80 MM HG: CPT | Performed by: STUDENT IN AN ORGANIZED HEALTH CARE EDUCATION/TRAINING PROGRAM

## 2025-06-11 NOTE — PROGRESS NOTES
Reason for Visit: subclavian arteriel stenosis.  Referring Clinician: D'Amico     History of Present Illness  Kathy Jimenez is a 74 y.o. female with history of ***    Left handed    HTN, HLD, DM    Surgeries: cataracts    Smokin.5 pack/day    Past Medical History  She has a past medical history of Cataract, COPD (chronic obstructive pulmonary disease) (Multi), Displaced bimalleolar fracture of unspecified lower leg, initial encounter for closed fracture (2014), Diverticulitis, Hypothyroid, Neuropathy, Osteoporosis, Pulmonary hypertension (Multi), Retinopathy, and Type 2 diabetes mellitus.    Past Surgical History  She has a past surgical history that includes Colonoscopy (10/31/2013); Colonoscopy w/ polypectomy (2014); and Cataract extraction (2019).    Social History  She reports that she has been smoking cigarettes. She has never used smokeless tobacco.    Family History  Family History[1]    Allergies  Sulfa (sulfonamide antibiotics)    Outpatient Medications  Current Outpatient Medications   Medication Instructions    albuterol 90 mcg/actuation inhaler Inhale.    amlodipine-olmesartan (Precious) 5-40 mg tablet 1 tablet, oral, Daily    artificial tears, dextran-hypomel-glycerin, 0.1-0.3-0.2 % ophthalmic solution 4 times daily    aspirin 81 mg, Daily    atorvastatin (LIPITOR) 20 mg, oral, Daily    blood sugar diagnostic (OneTouch Verio test strips) strip use to check blood sugar 3 times daily    buPROPion XL (WELLBUTRIN XL) 150 mg, oral, Daily    calcium carbonate (Oscal) 500 mg calcium (1,250 mg) tablet 1 tablet, Daily    carvedilol (COREG) 12.5 mg, oral, 2 times daily    chlorhexidine (Peridex) 0.12 % solution     cholecalciferol (Vitamin D-3) 50 mcg (2,000 unit) capsule 1 capsule, Daily    docusate sodium (COLACE) 50 mg, 2 times daily PRN    estradiol (Estrace) 0.01 % (0.1 mg/gram) vaginal cream Place a pea-sized or dime sized  amount vaginally 3 times a week.    furosemide (Lasix) 20 mg tablet 1 tablet, Daily    Gemtesa 75 mg, oral, Daily    Gemtesa 75 mg, oral, Daily    Jardiance 25 mg, oral, Daily    Kerendia 10 mg, oral, Daily    lancets (OneTouch Delica Plus Lancet) 33 gauge misc use as directed to test blood sugar three times daily    levothyroxine (SYNTHROID, LEVOXYL) 137 mcg, oral, Daily    magnesium citrate solution 195 mL, oral, Daily PRN    multivitamin tablet 1 tablet, Daily    OneTouch Verio Flex meter misc Use to test blood glucose daily    Ozempic 0.25 mg, subcutaneous, Weekly    PARoxetine (Paxil) 10 mg tablet 1 tablet, Daily    polyethylene glycol (GLYCOLAX, MIRALAX) 17 g, Daily    psyllium (Metamucil) powder 1 Dose, Daily    pyridoxine (Vitamin B-6) 100 mg tablet 1 tablet, Daily    vitamin E mixed 400 unit capsule Take by mouth.       Review of Systems  ROS    Last Recorded Vitals  Vitals:    06/11/25 0820   BP: 147/63   BP Location: Right arm   Patient Position: Sitting   BP Cuff Size: Small adult   Pulse: 79   Weight: 45.4 kg (100 lb 1.6 oz)   Height: 1.524 m (5')       Physical Examination  General: Well appearing, well-nourished, in no acute distress.  HEENT: Normocephalic atraumatic, pupils equal and reactive to light, extraocular muscles intact, no conjunctival injection, oropharynx clear without exudates.  Neck: Normal carotid arterial pulses, no arterial bruits, no thyromegaly.  Cardiac: Regular rhythm and normal heart rate.  Pulmonary: No increased work of breathing, no wheezes or crackles.  GI: Soft, ND, NT  Lower extremities: No wounds, ulcers, or discoloration. ***  Skin: Skin intact. No significant rashes or lesions present.  Neuro: Alert and oriented x 3, normal attention and cognition, no focal motor or sensory neurologic deficits.  Psych: Normal affect and mood.  Musculoskeletal: Normal gait normal muscle tone.    Laboratory Studies  Lab Results   Component Value Date    GLUCOSE 117 (H) 05/20/2025     "CALCIUM 9.8 05/20/2025     05/20/2025    K 4.8 05/20/2025    CO2 29 05/20/2025    CL 99 05/20/2025    BUN 14 05/20/2025    CREATININE 0.61 05/20/2025     Lab Results   Component Value Date    ALT 19 05/20/2025    AST 23 05/20/2025    ALKPHOS 57 05/20/2025    BILITOT 0.6 05/20/2025         Lab Results   Component Value Date    CHOL 141 04/14/2025    CHOL 141 10/18/2024    CHOL 131 04/10/2024     Lab Results   Component Value Date    HDL 56 04/14/2025    HDL 52.8 10/18/2024    HDL 50.0 04/10/2024     Lab Results   Component Value Date    LDLCALC 67 04/14/2025    LDLCALC 67 10/18/2024    LDLCALC 56 04/10/2024     Lab Results   Component Value Date    TRIG 98 04/14/2025    TRIG 104 10/18/2024    TRIG 125 04/10/2024     No components found for: \"CHOLHDL\"  Lab Results   Component Value Date    HGBA1C 7.0 (H) 04/14/2025     No components found for: \"UACR\"    Vascular Studies    ***    I personally reviewed the imaging studies. My impression is as follows: ***      Assessment and Plan  Problem List Items Addressed This Visit    None  Visit Diagnoses         Subclavian arterial stenosis                      Simone Shaw MD           [1]   Family History  Problem Relation Name Age of Onset    Lung cancer Father      Diabetes Sister      Diabetes Brother       " Visit    None  Visit Diagnoses         Subclavian arterial stenosis              - Kathy Jimenez is a 74-year-old female with history of HTN, HLD, DM here with asymptomatic left subclavian stenosis.  Patient is a current smoker.  Recommend smoking cessation.  Given asymptomatic status, no need for intervention at this time.  Patient does have a nonpalpable right femoral pulse on exam.  She denies any rest pain, claudication, and or tissue loss.  Will obtain a baseline RAMIRO.    Simone Shaw MD           [1]   Family History  Problem Relation Name Age of Onset    Lung cancer Father      Diabetes Sister      Diabetes Brother

## 2025-06-19 ENCOUNTER — ANCILLARY PROCEDURE (OUTPATIENT)
Dept: VASCULAR MEDICINE | Facility: CLINIC | Age: 75
End: 2025-06-19
Payer: MEDICARE

## 2025-06-19 ENCOUNTER — OFFICE VISIT (OUTPATIENT)
Dept: VASCULAR SURGERY | Facility: CLINIC | Age: 75
End: 2025-06-19
Payer: MEDICARE

## 2025-06-19 DIAGNOSIS — I77.1 SUBCLAVIAN ARTERIAL STENOSIS: ICD-10-CM

## 2025-06-19 DIAGNOSIS — I77.1 SUBCLAVIAN ARTERIAL STENOSIS: Primary | ICD-10-CM

## 2025-06-19 DIAGNOSIS — I73.9 PERIPHERAL VASCULAR DISEASE, UNSPECIFIED: ICD-10-CM

## 2025-06-19 DIAGNOSIS — I65.23 CAROTID STENOSIS, BILATERAL: ICD-10-CM

## 2025-06-19 DIAGNOSIS — I65.23 BILATERAL CAROTID ARTERY STENOSIS: ICD-10-CM

## 2025-06-19 PROCEDURE — 1160F RVW MEDS BY RX/DR IN RCRD: CPT | Performed by: NURSE PRACTITIONER

## 2025-06-19 PROCEDURE — 1159F MED LIST DOCD IN RCRD: CPT | Performed by: NURSE PRACTITIONER

## 2025-06-19 PROCEDURE — 93922 UPR/L XTREMITY ART 2 LEVELS: CPT

## 2025-06-19 PROCEDURE — 99213 OFFICE O/P EST LOW 20 MIN: CPT | Performed by: NURSE PRACTITIONER

## 2025-06-19 PROCEDURE — 93880 EXTRACRANIAL BILAT STUDY: CPT | Performed by: INTERNAL MEDICINE

## 2025-06-19 PROCEDURE — 93880 EXTRACRANIAL BILAT STUDY: CPT

## 2025-06-19 PROCEDURE — 93922 UPR/L XTREMITY ART 2 LEVELS: CPT | Performed by: INTERNAL MEDICINE

## 2025-06-20 ENCOUNTER — PHARMACY VISIT (OUTPATIENT)
Dept: PHARMACY | Facility: CLINIC | Age: 75
End: 2025-06-20
Payer: COMMERCIAL

## 2025-06-20 DIAGNOSIS — E03.9 HYPOTHYROIDISM, UNSPECIFIED TYPE: ICD-10-CM

## 2025-06-20 DIAGNOSIS — E78.5 HYPERLIPIDEMIA, UNSPECIFIED HYPERLIPIDEMIA TYPE: ICD-10-CM

## 2025-06-20 PROCEDURE — RXMED WILLOW AMBULATORY MEDICATION CHARGE

## 2025-06-20 RX ORDER — ATORVASTATIN CALCIUM 20 MG/1
20 TABLET, FILM COATED ORAL DAILY
Qty: 90 TABLET | Refills: 1 | Status: SHIPPED | OUTPATIENT
Start: 2025-06-20

## 2025-06-20 RX ORDER — LEVOTHYROXINE SODIUM 125 UG/1
125 TABLET ORAL DAILY
Qty: 90 TABLET | Refills: 1 | Status: SHIPPED | OUTPATIENT
Start: 2025-06-20

## 2025-06-20 NOTE — PROGRESS NOTES
Chief Complaint  Carotid Stenosis  Subclavian Stenosis  Peripheral Vascular Disease    History Of Present Illness  Kathy Jimenez returns to the office today  for a carotid duplex scan to include evaluation of her subclavian arteries and a PVR/RAMIRO study.      She was originally seen by our practice on 6/11/2025, for subclavian stenosis that was noted on a CT she had completed in February.  The patient has no complaints of TIA, stroke, or amaurosis fugax.  She  has no complaints of arm claudication.    During her office visit with our practice, her right femoral artery was not palpable.  Therefore, a PVR/RAMIRO study was ordered.      The patient is a poor historian.  She is accompanied by her  who helps to provide the patient's history.  She reports having a 1 year history of right hip discomfort at rest and with activity.  She believed this pain was related to arthritis.  In January, the discomfort increased and the patient began to develop generalized discomfort to her right upper and lower leg.  In the evenings, when she is sitting watching TV, she must hang her legs off the side of the couch for comfort.  However, she reports sleeping in a bed at night without having to hang her right leg off the side of the bed.  She denies ischemic rest pain.  She is unsure whether her right hip and leg pain increases with ambulation.    The patient is a current cigarette smoker.     Past Medical History  She has a past medical history of Cataract, COPD (chronic obstructive pulmonary disease) (Multi), Displaced bimalleolar fracture of unspecified lower leg, initial encounter for closed fracture (09/29/2014), Diverticulitis, Hypothyroid, Neuropathy, Osteoporosis, Pulmonary hypertension (Multi), Retinopathy, and Type 2 diabetes mellitus.    Surgical History  She has a past surgical history that includes Colonoscopy (10/31/2013); Colonoscopy w/ polypectomy (06/19/2014); and Cataract extraction (02/13/2019).     Social  History  She reports that she has been smoking cigarettes. She has never used smokeless tobacco. No history on file for alcohol use and drug use.    Family History  Family History[1]     Allergies  Sulfa (sulfonamide antibiotics)    Medications  Current Medications[2]     Review of Systems   CONSTITUTIONAL: Denies weight loss, fever and chills.  HEENT: Denies changes in vision and hearing.  RESPIRATORY: Denies SOB and cough.  CV: Denies palpitations and CP.  GI: Denies abdominal pain, nausea, vomiting and diarrhea.   : Denies dysuria and urinary frequency.   MSK: Positive for right hip and leg discomfort.  SKIN: Denies rash and pruritus.   VASC: Denies claudication and ischemic rest pain.  NEUROLOGICAL: Denies headache and syncope.  PSYCHIATRIC: Denies recent changes in mood. Denies anxiety and depression.    Physical exam  Constitutional:  Alert and oriented to person, place, and time.  In no acute distress.    HEENT:  Head is atraumatic, normocephalic.    Neck:  Soft and nontender.  No cervical bruits present.    Respiratory:  Lungs clear to auscultation.    Cardiac:  Regular rate and rhythm.  No murmurs present.      Abdominal:  Soft, nontender, nondistended, with bowel sounds present.   Extremities:  Lower extremities warm to touch and normal in color.  No edema, venous stasis changes, or open wounds present within bilateral legs or feet.  No ischemic changes present to bilateral feet or nails.  Pulse exam:  Radial pulses are palpable and equal.  Right femoral pulse not palpable.  Left femoral pulse palpable.  Right pedal pulses not palpable.  Left dorsalis pedis pulse easily palpable.  Musculoskeletal:  Moves extremities freely.  Dermatological: Skin color, texture, turgor normal.  No rashes of lesions present.    Neurological:  No focal deficits.    Psych:  Calm, cooperative.  Answers questions appropriately.      Last Recorded Vitals  There were no vitals taken for this visit.    Relevant Results  No  results found for this or any previous visit (from the past 24 hours).    Vascular US Carotid Artery Duplex Bilateral  Result Date: 6/19/2025           [place] [id number]            Phone [phone]  Vascular Lab Report  [facility] US CAROTID ARTERY DUPLEX BILATERAL Patient Name:      JOSE LERMA  Reading Physician:  [id number] Ifeoma Pena MD, RPVI Study Date:        6/19/2025            Ordering Provider:  [id number] DIAZ ATWOOD MRN/PID:           [id number]             Fellow: Accession#:        [id number]         Technologist:       Lorena Dennis RVT Date of Birth/Age: 1950 / 74 years Technologist 2: Gender:            F                    Encounter#:         [id number] Admission Status:  Outpatient           Location Performed: [facility]  Diagnosis/ICD: Stricture of artery-I77.1 CPT Codes:     73946 Cerebrovascular Carotid Duplex scan complete  **Report Amended** Date and Time: 6/19/2025 at 12:41:34 PM  CONCLUSIONS:  Right Carotid: Findings are consistent with less than 50% stenosis of the right proximal internal carotid artery. Right external carotid artery appears patent with no evidence of stenosis. The right vertebral artery is patent with antegrade flow. No evidence of hemodynamically significant stenosis in the right subclavian artery. Left Carotid: Findings are consistent with less than 50% stenosis of the left proximal internal carotid artery. Left external carotid artery appears patent with no evidence of stenosis. The left vertebral artery is patent with antegrade flow. Mildly elevated velocities and turbulence in the left subclavian artery; cannot rule out more proximal stenosis.  Imaging & Doppler Findings:  Right Plaque Morph: The proximal right internal carotid artery demonstrates heterogenous and calcified  plaque. The proximal right external carotid artery demonstrates heterogenous and calcified plaque. The distal right common carotid artery demonstrates heterogenous plaque. Left Plaque Morph: The proximal left internal carotid artery demonstrates heterogenous plaque. The proximal left external carotid artery demonstrates heterogenous plaque. The distal left common carotid artery demonstrates heterogenous plaque.   Right                        Left   PSV      EDV                PSV      EDV 81 cm/s  10 cm/s   CCA P    100 cm/s 15 cm/s 59 cm/s  14 cm/s   CCA D    78 cm/s  13 cm/s 53 cm/s  12 cm/s   ICA P    60 cm/s  11 cm/s 73 cm/s  11 cm/s   ICA M    94 cm/s  17 cm/s 98 cm/s  20 cm/s   ICA D    71 cm/s  20 cm/s 136 cm/s 0 cm/s     ECA     100 cm/s 0 cm/s 72 cm/s  7 cm/s  Vertebral  47 cm/s  13 cm/s 146 cm/s         Subclavian 255 cm/s                Right Left ICA/CCA Ratio  0.9  0.8   [id number] Pena MD, JESENIA Electronically signed by [id number] Arreola MD on 6/19/2025 at 12:41:06 PM  ** Final (Updated) **     Vascular US ankle brachial index (RAMIRO) without exercise  Result Date: 6/19/2025           [place] [address]            Phone [phone]  Vascular Lab Report  [facility] US ANKLE BRACHIAL INDEX (RAMIRO) WITHOUT EXERCISE Patient Name:      JOSE Gusman Physician:  [id number] Pena MD, JESENIA Study Date:        6/19/2025            Ordering Provider:  [id number] LANDRY MRN/PID:           [id number]             Fellow: Accession#:        [id number]         Technologist:       Lorena Dennis RVWEI Date of Birth/Age: 1950 / 74 years Technologist 2: Gender:            F                    Encounter#:         [id number] Admission Status:  Outpatient           Location Performed: [facility]  Diagnosis/ICD:  Stricture of artery-I77.1 CPT Codes:     09913 Peripheral artery RAMIRO Only  **CRITICAL RESULT** Critical Result: Severe disease in the right leg. Notification called to ALEXANDER Ledezma on 6/19/2025 at 11:34:56 AM. Acknowledged critical results notification communicated via Verbal by Lorena Dennis RVT.  CONCLUSIONS: Right Lower PVR: Evidence of severe arterial occlusive disease in the right lower extremity at rest. Decreased digital perfusion noted. Monophasic flow is noted in the right common femoral artery, right posterior tibial artery and right dorsalis pedis artery. Left Lower PVR: Evidence of mild arterial occlusive disease in the left lower extremity at rest. Decreased digital perfusion noted. Multiphasic flow is noted in the left common femoral artery, left posterior tibial artery and left dorsalis pedis artery. Severity of disease called by PVR tracings and Doppler waveforms due to partially non-compressible arteries. Additional Findings: Brachial pressures were taken several times to evaluate for left subclavian artery stenosis.  Imaging & Doppler Findings:  RIGHT Lower PVR                Pressures Ratios Right Posterior Tibial (Ankle) 33 mmHg   0.26 Right Dorsalis Pedis (Ankle)   31 mmHg   0.25 Right Digit (Great Toe)        14 mmHg   0.11   LEFT Lower PVR                Pressures Ratios Left Posterior Tibial (Ankle) 127 mmHg  1.01 Left Dorsalis Pedis (Ankle)   115 mmHg  0.91 Left Digit (Great Toe)        62 mmHg   0.49                      Right     Left Brachial Pressure 126 mmHg 120 mmHg   [id number] Ifeoma Pena MD, JESENIA Electronically signed by [id number] JESENIA Villeda MD on 6/19/2025 at 12:05:58 PM  ** Final **       Assessment/Plan  Carotid Stenosis  Subclavian Stenosis  Peripheral Vascular Disease    This is a 74-year-old female with bilateral carotid stenosis, left subclavian stenosis, and peripheral vascular disease.    I discussed the patient's test results with the patient and her  .  She is aware that she has less than 50% stenosis within her bilateral internal carotid arteries and minimal stenosis to her left subclavian artery.  The patient will not require vascular intervention for these issues at this time.  I suggested that she have her blood pressures drawn in her right arm only, as her left arm blood pressure readings may not be accurate due to her subclavian stenosis.  The patient will return to our office in 1 year for a repeat carotid duplex scan.    The patient has abnormal PVR readings from her right femoral artery extending through her lower leg.  Her left leg results are essentially within normal limits.  The patient's right RAMIRO measures 0.26, the left measures 1.01.    The patient's PVR/RAMIRO study will be discussed in detail with Dr. Shaw.  Over the next several days, the patient will monitor her right hip and leg discomfort to determine when she has pain.    The patient will be called next week and further recommendations will follow.        MONALISA Davila-MARIE         [1]   Family History  Problem Relation Name Age of Onset    Lung cancer Father      Diabetes Sister      Diabetes Brother     [2]   Current Outpatient Medications:     albuterol 90 mcg/actuation inhaler, Inhale., Disp: , Rfl:     amlodipine-olmesartan (Precious) 5-40 mg tablet, TAKE ONE TABLET BY MOUTH EVERY DAY, Disp: 90 tablet, Rfl: 3    artificial tears, dextran-hypomel-glycerin, 0.1-0.3-0.2 % ophthalmic solution, Administer into affected eye(s) 4 times a day., Disp: , Rfl:     aspirin 81 mg EC tablet, Take 1 tablet (81 mg) by mouth once daily., Disp: , Rfl:     atorvastatin (Lipitor) 20 mg tablet, TAKE ONE TABLET BY MOUTH EVERY DAY, Disp: 90 tablet, Rfl: 3    blood sugar diagnostic (OneTouch Verio test strips) strip, use to check blood sugar 3 times daily, Disp: 100 each, Rfl: 3    buPROPion XL (Wellbutrin XL) 150 mg 24 hr tablet, TAKE ONE TABLET BY MOUTH EVERY DAY, Disp: 90 tablet, Rfl: 0     calcium carbonate (Oscal) 500 mg calcium (1,250 mg) tablet, Take 1 tablet by mouth once daily., Disp: , Rfl:     carvedilol (Coreg) 12.5 mg tablet, Take 1 tablet (12.5 mg) by mouth 2 times a day., Disp: 180 tablet, Rfl: 1    chlorhexidine (Peridex) 0.12 % solution, , Disp: , Rfl:     cholecalciferol (Vitamin D-3) 50 mcg (2,000 unit) capsule, Take 1 capsule (50 mcg) by mouth once daily., Disp: , Rfl:     docusate sodium (Colace) 50 mg capsule, Take 1 capsule (50 mg) by mouth 2 times a day as needed for constipation., Disp: , Rfl:     empagliflozin (Jardiance) 25 mg tablet, Take 1 tablet (25 mg) by mouth once daily., Disp: 90 tablet, Rfl: 3    estradiol (Estrace) 0.01 % (0.1 mg/gram) vaginal cream, Place a pea-sized or dime sized amount vaginally 3 times a week., Disp: 42.5 g, Rfl: 2    finerenone (Kerendia) 10 mg tablet tablet, Take 1 tablet (10 mg) by mouth once daily., Disp: 90 tablet, Rfl: 1    furosemide (Lasix) 20 mg tablet, Take 1 tablet (20 mg) by mouth once daily. (Patient not taking: No sig reported), Disp: , Rfl:     lancets (OneTouch Delica Plus Lancet) 33 gauge misc, use as directed to test blood sugar three times daily, Disp: 100 each, Rfl: 3    levothyroxine (Synthroid, Levoxyl) 137 mcg tablet, Take 1 tablet (137 mcg) by mouth early in the morning.., Disp: 90 tablet, Rfl: 3    magnesium citrate solution, Take 195 mL by mouth once daily as needed (constipation). (Patient not taking: No sig reported), Disp: 2000 mL, Rfl: 2    multivitamin tablet, Take 1 tablet by mouth once daily., Disp: , Rfl:     OneTouch Verio Flex meter misc, Use to test blood glucose daily, Disp: 1 each, Rfl: 0    PARoxetine (Paxil) 10 mg tablet, Take 1 tablet (10 mg) by mouth once daily. (Patient not taking: No sig reported), Disp: , Rfl:     polyethylene glycol (Glycolax, Miralax) 17 gram packet, Take 17 g by mouth once daily., Disp: , Rfl:     psyllium (Metamucil) powder, Take 1 Dose (5.8 g) by mouth once daily., Disp: , Rfl:      pyridoxine (Vitamin B-6) 100 mg tablet, Take 1 tablet (100 mg) by mouth once daily., Disp: , Rfl:     semaglutide (Ozempic) 0.25 mg or 0.5 mg (2 mg/3 mL) pen injector, Inject 0.25 mg under the skin 1 (one) time per week., Disp: 9 mL, Rfl: 3    vibegron (Gemtesa) 75 mg tablet, Take 1 tablet (75 mg) by mouth once daily., Disp: 28 tablet, Rfl: 0    vibegron (Gemtesa) 75 mg tablet, Take 1 tablet (75 mg) by mouth once daily., Disp: 30 tablet, Rfl: 11    vitamin E mixed 400 unit capsule, Take by mouth., Disp: , Rfl:

## 2025-07-13 PROCEDURE — RXMED WILLOW AMBULATORY MEDICATION CHARGE

## 2025-07-15 DIAGNOSIS — E03.9 HYPOTHYROIDISM, UNSPECIFIED TYPE: ICD-10-CM

## 2025-07-15 RX ORDER — LEVOTHYROXINE SODIUM 137 UG/1
137 TABLET ORAL DAILY
Qty: 90 TABLET | Refills: 3 | Status: SHIPPED | OUTPATIENT
Start: 2025-07-15 | End: 2026-07-15

## 2025-07-16 ENCOUNTER — PHARMACY VISIT (OUTPATIENT)
Dept: PHARMACY | Facility: CLINIC | Age: 75
End: 2025-07-16
Payer: COMMERCIAL

## 2025-07-16 PROCEDURE — RXMED WILLOW AMBULATORY MEDICATION CHARGE

## 2025-07-21 ENCOUNTER — PHARMACY VISIT (OUTPATIENT)
Dept: PHARMACY | Facility: CLINIC | Age: 75
End: 2025-07-21
Payer: COMMERCIAL

## 2025-07-28 DIAGNOSIS — E11.9 TYPE 2 DIABETES MELLITUS WITHOUT COMPLICATION, WITHOUT LONG-TERM CURRENT USE OF INSULIN: ICD-10-CM

## 2025-07-28 RX ORDER — BLOOD SUGAR DIAGNOSTIC
STRIP MISCELLANEOUS
Qty: 300 STRIP | Refills: 3 | Status: SHIPPED | OUTPATIENT
Start: 2025-07-28

## 2025-07-28 RX ORDER — BLOOD SUGAR DIAGNOSTIC
300 STRIP MISCELLANEOUS
COMMUNITY
End: 2025-07-28 | Stop reason: SDUPTHER

## 2025-07-28 RX ORDER — DEXTROSE 4 G
TABLET,CHEWABLE ORAL
Qty: 1 EACH | Refills: 0 | Status: SHIPPED | OUTPATIENT
Start: 2025-07-28

## 2025-07-28 RX ORDER — DEXTROSE 4 G
TABLET,CHEWABLE ORAL
COMMUNITY
End: 2025-07-28 | Stop reason: SDUPTHER

## 2025-07-29 PROCEDURE — RXMED WILLOW AMBULATORY MEDICATION CHARGE

## 2025-07-31 ENCOUNTER — APPOINTMENT (OUTPATIENT)
Dept: PRIMARY CARE | Facility: CLINIC | Age: 75
End: 2025-07-31
Payer: MEDICARE

## 2025-07-31 ENCOUNTER — PHARMACY VISIT (OUTPATIENT)
Dept: PHARMACY | Facility: CLINIC | Age: 75
End: 2025-07-31
Payer: COMMERCIAL

## 2025-07-31 VITALS
SYSTOLIC BLOOD PRESSURE: 128 MMHG | DIASTOLIC BLOOD PRESSURE: 50 MMHG | RESPIRATION RATE: 16 BRPM | HEART RATE: 81 BPM | WEIGHT: 98 LBS | HEIGHT: 60 IN | BODY MASS INDEX: 19.24 KG/M2 | OXYGEN SATURATION: 97 %

## 2025-07-31 DIAGNOSIS — R41.3 MEMORY DEFICIT: ICD-10-CM

## 2025-07-31 DIAGNOSIS — J43.9 PULMONARY EMPHYSEMA, UNSPECIFIED EMPHYSEMA TYPE (MULTI): ICD-10-CM

## 2025-07-31 DIAGNOSIS — I10 ESSENTIAL HYPERTENSION: ICD-10-CM

## 2025-07-31 DIAGNOSIS — E78.5 HYPERLIPIDEMIA, UNSPECIFIED HYPERLIPIDEMIA TYPE: ICD-10-CM

## 2025-07-31 DIAGNOSIS — I10 HYPERTENSION, UNSPECIFIED TYPE: Primary | ICD-10-CM

## 2025-07-31 DIAGNOSIS — I27.20 PULMONARY HYPERTENSION, UNSPECIFIED (MULTI): ICD-10-CM

## 2025-07-31 DIAGNOSIS — N39.41 URGE INCONTINENCE: ICD-10-CM

## 2025-07-31 DIAGNOSIS — E11.9 TYPE 2 DIABETES MELLITUS WITHOUT COMPLICATION, WITHOUT LONG-TERM CURRENT USE OF INSULIN: ICD-10-CM

## 2025-07-31 DIAGNOSIS — E03.9 HYPOTHYROIDISM, UNSPECIFIED TYPE: ICD-10-CM

## 2025-07-31 PROCEDURE — G8433 SCR FOR DEP NOT CPT DOC RSN: HCPCS | Performed by: STUDENT IN AN ORGANIZED HEALTH CARE EDUCATION/TRAINING PROGRAM

## 2025-07-31 PROCEDURE — 1159F MED LIST DOCD IN RCRD: CPT | Performed by: STUDENT IN AN ORGANIZED HEALTH CARE EDUCATION/TRAINING PROGRAM

## 2025-07-31 PROCEDURE — 3078F DIAST BP <80 MM HG: CPT | Performed by: STUDENT IN AN ORGANIZED HEALTH CARE EDUCATION/TRAINING PROGRAM

## 2025-07-31 PROCEDURE — 1160F RVW MEDS BY RX/DR IN RCRD: CPT | Performed by: STUDENT IN AN ORGANIZED HEALTH CARE EDUCATION/TRAINING PROGRAM

## 2025-07-31 PROCEDURE — 3074F SYST BP LT 130 MM HG: CPT | Performed by: STUDENT IN AN ORGANIZED HEALTH CARE EDUCATION/TRAINING PROGRAM

## 2025-07-31 PROCEDURE — 99214 OFFICE O/P EST MOD 30 MIN: CPT | Performed by: STUDENT IN AN ORGANIZED HEALTH CARE EDUCATION/TRAINING PROGRAM

## 2025-07-31 PROCEDURE — G2211 COMPLEX E/M VISIT ADD ON: HCPCS | Performed by: STUDENT IN AN ORGANIZED HEALTH CARE EDUCATION/TRAINING PROGRAM

## 2025-07-31 PROCEDURE — 3008F BODY MASS INDEX DOCD: CPT | Performed by: STUDENT IN AN ORGANIZED HEALTH CARE EDUCATION/TRAINING PROGRAM

## 2025-07-31 RX ORDER — AMLODIPINE BESYLATE AND OLMESARTAN MEDOXOMIL 5; 20 MG/1; MG/1
1 TABLET ORAL DAILY
Qty: 30 TABLET | Refills: 1 | Status: SHIPPED | OUTPATIENT
Start: 2025-07-31 | End: 2025-09-29

## 2025-07-31 NOTE — PROGRESS NOTES
75-year-old female presenting for follow-up on multiple concerns.  Stable and doing relatively well.    HTN  Has been doing better on new regimen, off clonidine, on Kerendia.  Had recent low reading, and low today.  Does admit to some lightheadedness with positional changes.     Hypothyroidism  Tolerating regimen well.  Continues to have slightly elevated TSH and T4.  Following with and managed by endocrinology.     DMII  Stable and doing relatively well on current regimen.     HLD  Stable     Urge incontinence, uterovaginal prolapse  Following with urogynecology, stable and doing well.    Emphysema/COPD  Seen on lung cancer screening, unmedicated, asymptomatic.    Pulmonary hypertension  Following with cardiology    Memory deficit  Has to repeat questions frequently.  No safety concerns.  Did not establish with neurology.    SocHx:   - Smoking: Daily, 55-pack-year history    12 point ROS reviewed and negative other than as stated in HPI      General: Alert, oriented, pleasant, in no acute distress  HEENT:      Head: normocephalic, atraumatic;      eyes: EOMI, no scleral icterus;   Neck: soft, supple, non-tender, no masses appreciated  CV: Heart with regular rate and rhythm, normal S1/S2, no murmurs  Lungs: CTAB without wheezing, rhonchi or rales; good respiratory effort, no increased work of breathing  Neuro: Cranial nerves grossly intact; alert and oriented  Psych: Appropriate mood and affect    #HTN  - BP slightly low today, does admit to having some orthostatic lightheadedness  - Continue carvedilol 12.5 mg twice daily, amlodipine-olmesartan 5-40 mg daily, Kerendia 10 mg daily  -De-escalate regimen to amlodipine-olmesartan 5-20 mg daily, given BP log, monitor BP and symptoms closely  -Continue with cardiology    #DMII #Proteinuria  -Most recent A1c at 7.0, 04/2025  -Most recent UACR 53, 04/2025  -Currently on Ozempic 0.5 mg weekly Jardiance 25 mg daily  -Continue Kerendia 10 mg daily  -Continue to follow with  [facility] pharmacist  -[facility] ophthalmology  - Follows with podiatry  - Repeat A1c     #Hypothyroidism  - Currently on levothyroxine 137 mcg daily  -Continue to follow with endocrinology  -Repeat lab pending through endocrinology     #HLD #Carotid stenosis #subclavian stenosis #PVD  -Following with vascular, no intervention at this time, will follow annually  -Continue atorvastatin 20 mg daily  -Repeat lipid panel    #Urge incontinence #uterovaginal prolapse  -Continue to follow with urogynecology    #Empysema/COPD  -Stable without treatment  -Smoking cessation encouraged    #Pulmonary HTN  -Continue to follow with cardiology    #Memory deficits  -Neurology referral reordered    Follow-up 4 months, Medicare at that time    Chris D'Amico, DO

## 2025-07-31 NOTE — PATIENT INSTRUCTIONS
Will change amlodipine-olmesartan 5-40 mg -->    amlodipine-olmesartan 5-20 mg    Take BP on right arm

## 2025-08-12 PROCEDURE — RXMED WILLOW AMBULATORY MEDICATION CHARGE

## 2025-08-13 ENCOUNTER — PHARMACY VISIT (OUTPATIENT)
Dept: PHARMACY | Facility: CLINIC | Age: 75
End: 2025-08-13
Payer: COMMERCIAL

## 2025-08-16 LAB
ALBUMIN SERPL-MCNC: 4.5 G/DL (ref 3.6–5.1)
ALP SERPL-CCNC: 61 U/L (ref 37–153)
ALT SERPL-CCNC: 18 U/L (ref 6–29)
ANION GAP SERPL CALCULATED.4IONS-SCNC: 10 MMOL/L (CALC) (ref 7–17)
AST SERPL-CCNC: 19 U/L (ref 10–35)
BILIRUB SERPL-MCNC: 0.7 MG/DL (ref 0.2–1.2)
BUN SERPL-MCNC: 14 MG/DL (ref 7–25)
CALCIUM SERPL-MCNC: 10 MG/DL (ref 8.6–10.4)
CHLORIDE SERPL-SCNC: 99 MMOL/L (ref 98–110)
CHOLEST SERPL-MCNC: 146 MG/DL
CHOLEST/HDLC SERPL: 2.7 (CALC)
CO2 SERPL-SCNC: 27 MMOL/L (ref 20–32)
CREAT SERPL-MCNC: 0.62 MG/DL (ref 0.6–1)
EGFRCR SERPLBLD CKD-EPI 2021: 94 ML/MIN/1.73M2
ERYTHROCYTE [DISTWIDTH] IN BLOOD BY AUTOMATED COUNT: 11.9 % (ref 11–15)
EST. AVERAGE GLUCOSE BLD GHB EST-MCNC: 140 MG/DL
EST. AVERAGE GLUCOSE BLD GHB EST-SCNC: 7.7 MMOL/L
GLUCOSE SERPL-MCNC: 143 MG/DL (ref 65–99)
HBA1C MFR BLD: 6.5 %
HCT VFR BLD AUTO: 46.9 % (ref 35–45)
HDLC SERPL-MCNC: 55 MG/DL
HGB BLD-MCNC: 15 G/DL (ref 11.7–15.5)
LDLC SERPL CALC-MCNC: 72 MG/DL (CALC)
MCH RBC QN AUTO: 29.9 PG (ref 27–33)
MCHC RBC AUTO-ENTMCNC: 32 G/DL (ref 32–36)
MCV RBC AUTO: 93.4 FL (ref 80–100)
NONHDLC SERPL-MCNC: 91 MG/DL (CALC)
PLATELET # BLD AUTO: 285 THOUSAND/UL (ref 140–400)
PMV BLD REES-ECKER: 9.2 FL (ref 7.5–12.5)
POTASSIUM SERPL-SCNC: 4.4 MMOL/L (ref 3.5–5.3)
PROT SERPL-MCNC: 6.8 G/DL (ref 6.1–8.1)
RBC # BLD AUTO: 5.02 MILLION/UL (ref 3.8–5.1)
SODIUM SERPL-SCNC: 136 MMOL/L (ref 135–146)
TRIGL SERPL-MCNC: 106 MG/DL
WBC # BLD AUTO: 10.8 THOUSAND/UL (ref 3.8–10.8)

## 2025-08-18 ENCOUNTER — RESULTS FOLLOW-UP (OUTPATIENT)
Dept: PRIMARY CARE | Facility: CLINIC | Age: 75
End: 2025-08-18
Payer: MEDICARE

## 2025-08-21 ENCOUNTER — APPOINTMENT (OUTPATIENT)
Dept: UROLOGY | Facility: CLINIC | Age: 75
End: 2025-08-21
Payer: MEDICARE

## 2025-08-21 VITALS
TEMPERATURE: 97.4 F | HEIGHT: 60 IN | RESPIRATION RATE: 16 BRPM | SYSTOLIC BLOOD PRESSURE: 130 MMHG | WEIGHT: 100.4 LBS | BODY MASS INDEX: 19.71 KG/M2 | DIASTOLIC BLOOD PRESSURE: 80 MMHG | HEART RATE: 70 BPM

## 2025-08-21 DIAGNOSIS — N81.4 PROLAPSE, UTEROVAGINAL: ICD-10-CM

## 2025-08-21 DIAGNOSIS — N32.81 OAB (OVERACTIVE BLADDER): Primary | ICD-10-CM

## 2025-08-21 PROCEDURE — 1126F AMNT PAIN NOTED NONE PRSNT: CPT | Performed by: OBSTETRICS & GYNECOLOGY

## 2025-08-21 PROCEDURE — 1159F MED LIST DOCD IN RCRD: CPT | Performed by: OBSTETRICS & GYNECOLOGY

## 2025-08-21 PROCEDURE — 3079F DIAST BP 80-89 MM HG: CPT | Performed by: OBSTETRICS & GYNECOLOGY

## 2025-08-21 PROCEDURE — 99214 OFFICE O/P EST MOD 30 MIN: CPT | Performed by: OBSTETRICS & GYNECOLOGY

## 2025-08-21 PROCEDURE — 3008F BODY MASS INDEX DOCD: CPT | Performed by: OBSTETRICS & GYNECOLOGY

## 2025-08-21 PROCEDURE — G2211 COMPLEX E/M VISIT ADD ON: HCPCS | Performed by: OBSTETRICS & GYNECOLOGY

## 2025-08-21 PROCEDURE — 3075F SYST BP GE 130 - 139MM HG: CPT | Performed by: OBSTETRICS & GYNECOLOGY

## 2025-08-21 ASSESSMENT — PAIN SCALES - GENERAL: PAINLEVEL_OUTOF10: 0-NO PAIN

## 2025-08-22 ENCOUNTER — APPOINTMENT (OUTPATIENT)
Dept: PHARMACY | Facility: HOSPITAL | Age: 75
End: 2025-08-22
Payer: MEDICARE

## 2025-08-22 DIAGNOSIS — E11.9 CONTROLLED TYPE 2 DIABETES MELLITUS WITHOUT COMPLICATION, WITHOUT LONG-TERM CURRENT USE OF INSULIN: ICD-10-CM

## 2025-08-25 DIAGNOSIS — F32.A DEPRESSION, UNSPECIFIED DEPRESSION TYPE: ICD-10-CM

## 2025-08-25 RX ORDER — BUPROPION HYDROCHLORIDE 150 MG/1
150 TABLET ORAL DAILY
Qty: 90 TABLET | Refills: 0 | Status: SHIPPED | OUTPATIENT
Start: 2025-08-25

## 2025-09-01 DIAGNOSIS — I10 HYPERTENSION, UNSPECIFIED TYPE: ICD-10-CM

## 2025-09-03 RX ORDER — CARVEDILOL 12.5 MG/1
12.5 TABLET ORAL 2 TIMES DAILY
Qty: 180 TABLET | Refills: 1 | Status: SHIPPED | OUTPATIENT
Start: 2025-09-03

## 2025-11-18 ENCOUNTER — APPOINTMENT (OUTPATIENT)
Dept: OPHTHALMOLOGY | Facility: CLINIC | Age: 75
End: 2025-11-18
Payer: MEDICARE

## 2025-11-21 ENCOUNTER — APPOINTMENT (OUTPATIENT)
Dept: PHARMACY | Facility: HOSPITAL | Age: 75
End: 2025-11-21
Payer: MEDICARE

## 2025-11-26 ENCOUNTER — APPOINTMENT (OUTPATIENT)
Dept: PRIMARY CARE | Facility: CLINIC | Age: 75
End: 2025-11-26
Payer: MEDICARE

## 2025-12-04 ENCOUNTER — APPOINTMENT (OUTPATIENT)
Dept: UROLOGY | Facility: CLINIC | Age: 75
End: 2025-12-04
Payer: MEDICARE

## 2025-12-05 ENCOUNTER — APPOINTMENT (OUTPATIENT)
Dept: PHARMACY | Facility: HOSPITAL | Age: 75
End: 2025-12-05
Payer: MEDICARE

## 2026-03-17 ENCOUNTER — APPOINTMENT (OUTPATIENT)
Dept: ENDOCRINOLOGY | Facility: CLINIC | Age: 76
End: 2026-03-17
Payer: MEDICARE

## 2026-04-01 ENCOUNTER — APPOINTMENT (OUTPATIENT)
Dept: PHARMACY | Facility: HOSPITAL | Age: 76
End: 2026-04-01
Payer: MEDICARE

## 2026-06-11 ENCOUNTER — APPOINTMENT (OUTPATIENT)
Dept: VASCULAR SURGERY | Facility: CLINIC | Age: 76
End: 2026-06-11
Payer: MEDICARE